# Patient Record
Sex: MALE | Race: WHITE | HISPANIC OR LATINO | Employment: UNEMPLOYED | URBAN - METROPOLITAN AREA
[De-identification: names, ages, dates, MRNs, and addresses within clinical notes are randomized per-mention and may not be internally consistent; named-entity substitution may affect disease eponyms.]

---

## 2022-07-13 ENCOUNTER — TELEPHONE (OUTPATIENT)
Dept: FAMILY MEDICINE | Facility: CLINIC | Age: 27
End: 2022-07-13
Payer: COMMERCIAL

## 2022-07-14 ENCOUNTER — OFFICE VISIT (OUTPATIENT)
Dept: FAMILY MEDICINE | Facility: CLINIC | Age: 27
End: 2022-07-14
Payer: COMMERCIAL

## 2022-07-14 VITALS
HEART RATE: 72 BPM | BODY MASS INDEX: 41.75 KG/M2 | SYSTOLIC BLOOD PRESSURE: 124 MMHG | OXYGEN SATURATION: 96 % | TEMPERATURE: 98 F | HEIGHT: 73 IN | RESPIRATION RATE: 17 BRPM | DIASTOLIC BLOOD PRESSURE: 62 MMHG | WEIGHT: 315 LBS

## 2022-07-14 DIAGNOSIS — Z00.00 VISIT FOR WELL MAN HEALTH CHECK: Primary | ICD-10-CM

## 2022-07-14 DIAGNOSIS — Z11.59 ENCOUNTER FOR HEPATITIS C SCREENING TEST FOR LOW RISK PATIENT: ICD-10-CM

## 2022-07-14 DIAGNOSIS — Z11.4 ENCOUNTER FOR SCREENING FOR HIV: ICD-10-CM

## 2022-07-14 PROCEDURE — 1160F RVW MEDS BY RX/DR IN RCRD: CPT | Mod: CPTII,S$GLB,, | Performed by: FAMILY MEDICINE

## 2022-07-14 PROCEDURE — 3078F DIAST BP <80 MM HG: CPT | Mod: CPTII,S$GLB,, | Performed by: FAMILY MEDICINE

## 2022-07-14 PROCEDURE — 99999 PR PBB SHADOW E&M-EST. PATIENT-LVL IV: ICD-10-PCS | Mod: PBBFAC,,, | Performed by: FAMILY MEDICINE

## 2022-07-14 PROCEDURE — 99385 PREV VISIT NEW AGE 18-39: CPT | Mod: S$GLB,,, | Performed by: FAMILY MEDICINE

## 2022-07-14 PROCEDURE — 1160F PR REVIEW ALL MEDS BY PRESCRIBER/CLIN PHARMACIST DOCUMENTED: ICD-10-PCS | Mod: CPTII,S$GLB,, | Performed by: FAMILY MEDICINE

## 2022-07-14 PROCEDURE — 99999 PR PBB SHADOW E&M-EST. PATIENT-LVL IV: CPT | Mod: PBBFAC,,, | Performed by: FAMILY MEDICINE

## 2022-07-14 PROCEDURE — 1159F PR MEDICATION LIST DOCUMENTED IN MEDICAL RECORD: ICD-10-PCS | Mod: CPTII,S$GLB,, | Performed by: FAMILY MEDICINE

## 2022-07-14 PROCEDURE — 1159F MED LIST DOCD IN RCRD: CPT | Mod: CPTII,S$GLB,, | Performed by: FAMILY MEDICINE

## 2022-07-14 PROCEDURE — 3074F PR MOST RECENT SYSTOLIC BLOOD PRESSURE < 130 MM HG: ICD-10-PCS | Mod: CPTII,S$GLB,, | Performed by: FAMILY MEDICINE

## 2022-07-14 PROCEDURE — 3078F PR MOST RECENT DIASTOLIC BLOOD PRESSURE < 80 MM HG: ICD-10-PCS | Mod: CPTII,S$GLB,, | Performed by: FAMILY MEDICINE

## 2022-07-14 PROCEDURE — 3074F SYST BP LT 130 MM HG: CPT | Mod: CPTII,S$GLB,, | Performed by: FAMILY MEDICINE

## 2022-07-14 PROCEDURE — 99385 PR PREVENTIVE VISIT,NEW,18-39: ICD-10-PCS | Mod: S$GLB,,, | Performed by: FAMILY MEDICINE

## 2022-07-14 PROCEDURE — 3008F PR BODY MASS INDEX (BMI) DOCUMENTED: ICD-10-PCS | Mod: CPTII,S$GLB,, | Performed by: FAMILY MEDICINE

## 2022-07-14 PROCEDURE — 3008F BODY MASS INDEX DOCD: CPT | Mod: CPTII,S$GLB,, | Performed by: FAMILY MEDICINE

## 2022-07-14 NOTE — PROGRESS NOTES
"  Well Man VISIT      CHIEF COMPLAINT  Chief Complaint   Patient presents with    Annual Exam       HPI  James Jeff is a 26 y.o. male who presents for wellness.     Social Factors  Tobacco use: No  Ready to Quit: No  Alcohol: Yes socially  Intimate partner violence screening  "Do you feel safe in your current relationship?" Yes   "Have you ever been in a relationship in which your partner frightened you or hurt you?" No  Living Will/POA: No  Regular Exercise: No    Depression  Over the past two weeks, have you felt down, depressed, or hopeless? No  Over the past two weeks, have you felt little interest or pleasure in doing things? No    Reproductive Health  STD screening in last year: deferred  HIV screening: ordered    Screen for Chronic Disease  CHD Risk Factors: male gender and obesity (BMI >= 30 kg/m2)  Estimated body mass index is 42 kg/m² as calculated from the following:    Height as of this encounter: 6' 1" (1.854 m).    Weight as of this encounter: 144.4 kg (318 lb 5.5 oz).  Dyslipidemia screening needed: ordered  T2DM screening needed:  ordered  Colonoscopy needed: n/a  PSA needed: n/a  AAA screening needed:n/a  Screen men 35 years and older, and men 20 to 34 years of age who have cardiovascular risk factors for dyslipidemia  Begin screening colonoscopies at 50 years of age in men of average risk, and continue until 75 years of age; offer fecal occult blood testing every year, flexible sigmoidoscopy every five years combined with fecal occult blood testing every three years, or colonoscopy every 10 years   The American U[employer] Association recommends offering PSA testing and digital rectal examination to well-informed men beginning at 40 years of age and continuing until life expectancy is less than 10 years  Screen once with ultrasonography in men 65 to 75 years of age if they have a family history or have smoked at [id number] cigarettes in their lifetime  Screen men with a sustained " "blood pressure greater than 135/80 mm Hg for T2DM      Immunizations  delayed    ALLERGIES and MEDS were verified.   PMHx, PSHx, FHx, SOCIALHx were updated as pertinent.    REVIEW OF SYSTEMS  Review of Systems   Constitutional: Negative.    HENT: Negative.    Eyes: Negative.    Respiratory: Negative.    Cardiovascular: Negative.    Gastrointestinal: Negative.    Musculoskeletal: Negative.    Skin: Negative.    Neurological: Positive for tingling.         PHYSICAL EXAM  VITAL SIGNS: /62   Pulse 72   Temp 98.2 °F (36.8 °C) (Oral)   Resp 17   Ht 6' 1" (1.854 m)   Wt (!) 144.4 kg (318 lb 5.5 oz)   SpO2 96%   BMI 42.00 kg/m²   GEN: Well developed, Well nourished, No acute distress.  HENT: Normocephalic, Atraumatic, Bilateral external ears normal, Nose normal, Oropharynx moist, No oral exudates.   Eyes: PERR, EOMI, Conjunctiva normal, No discharge.   Neck: Supple, No tenderness.  Lymphatic: No cervical or supraclavicular lymphadenopathy noted.   Cardiovascular: Normal heart rate, Normal rhythm, No murmurs, No rubs, No gallops.   Thorax & Lungs: Normal breath sounds, No respiratory distress, No wheezing.  Abdomen: Soft, No tenderness, Bowel sounds normal.  Genital: deferred  Skin: Warm, Dry, No erythema, No rash.   Extremities: No edema, No tenderness.       ASSESSMENT/PLAN    James was seen today for annual exam.    Diagnoses and all orders for this visit:    Visit for [facility] check  -     CBC Auto Differential; Future  -     Comprehensive Metabolic Panel; Future  -     Lipid Panel; Future  -     Urinalysis; Future  -     Hemoglobin A1C; Future  -     TSH; Future         FOLLOW UP: 1 year or sooner if needed    "

## 2022-07-21 ENCOUNTER — LAB VISIT (OUTPATIENT)
Dept: LAB | Facility: HOSPITAL | Age: 27
End: 2022-07-21
Attending: FAMILY MEDICINE
Payer: COMMERCIAL

## 2022-07-21 DIAGNOSIS — Z11.4 ENCOUNTER FOR SCREENING FOR HIV: ICD-10-CM

## 2022-07-21 DIAGNOSIS — Z00.00 VISIT FOR WELL MAN HEALTH CHECK: ICD-10-CM

## 2022-07-21 DIAGNOSIS — Z11.59 ENCOUNTER FOR HEPATITIS C SCREENING TEST FOR LOW RISK PATIENT: ICD-10-CM

## 2022-07-21 LAB
ALBUMIN SERPL BCP-MCNC: 3.9 G/DL (ref 3.5–5.2)
ALP SERPL-CCNC: 54 U/L (ref 55–135)
ALT SERPL W/O P-5'-P-CCNC: 108 U/L (ref 10–44)
ANION GAP SERPL CALC-SCNC: 11 MMOL/L (ref 8–16)
AST SERPL-CCNC: 38 U/L (ref 10–40)
BASOPHILS # BLD AUTO: 0.04 K/UL (ref 0–0.2)
BASOPHILS NFR BLD: 0.5 % (ref 0–1.9)
BILIRUB SERPL-MCNC: 0.8 MG/DL (ref 0.1–1)
BUN SERPL-MCNC: 13 MG/DL (ref 6–20)
CALCIUM SERPL-MCNC: 9.4 MG/DL (ref 8.7–10.5)
CHLORIDE SERPL-SCNC: 105 MMOL/L (ref 95–110)
CHOLEST SERPL-MCNC: 159 MG/DL (ref 120–199)
CHOLEST/HDLC SERPL: 4 {RATIO} (ref 2–5)
CO2 SERPL-SCNC: 23 MMOL/L (ref 23–29)
CREAT SERPL-MCNC: 0.9 MG/DL (ref 0.5–1.4)
DIFFERENTIAL METHOD: NORMAL
EOSINOPHIL # BLD AUTO: 0.4 K/UL (ref 0–0.5)
EOSINOPHIL NFR BLD: 4.5 % (ref 0–8)
ERYTHROCYTE [DISTWIDTH] IN BLOOD BY AUTOMATED COUNT: 12.6 % (ref 11.5–14.5)
EST. GFR  (AFRICAN AMERICAN): >60 ML/MIN/1.73 M^2
EST. GFR  (NON AFRICAN AMERICAN): >60 ML/MIN/1.73 M^2
ESTIMATED AVG GLUCOSE: 108 MG/DL (ref 68–131)
GLUCOSE SERPL-MCNC: 85 MG/DL (ref 70–110)
HBA1C MFR BLD: 5.4 % (ref 4–5.6)
HCT VFR BLD AUTO: 44.6 % (ref 40–54)
HDLC SERPL-MCNC: 40 MG/DL (ref 40–75)
HDLC SERPL: 25.2 % (ref 20–50)
HGB BLD-MCNC: 15.2 G/DL (ref 14–18)
IMM GRANULOCYTES # BLD AUTO: 0.03 K/UL (ref 0–0.04)
IMM GRANULOCYTES NFR BLD AUTO: 0.4 % (ref 0–0.5)
LDLC SERPL CALC-MCNC: 100.4 MG/DL (ref 63–159)
LYMPHOCYTES # BLD AUTO: 2.9 K/UL (ref 1–4.8)
LYMPHOCYTES NFR BLD: 36.4 % (ref 18–48)
MCH RBC QN AUTO: 28.7 PG (ref 27–31)
MCHC RBC AUTO-ENTMCNC: 34.1 G/DL (ref 32–36)
MCV RBC AUTO: 84 FL (ref 82–98)
MONOCYTES # BLD AUTO: 0.7 K/UL (ref 0.3–1)
MONOCYTES NFR BLD: 9.1 % (ref 4–15)
NEUTROPHILS # BLD AUTO: 3.8 K/UL (ref 1.8–7.7)
NEUTROPHILS NFR BLD: 49.1 % (ref 38–73)
NONHDLC SERPL-MCNC: 119 MG/DL
NRBC BLD-RTO: 0 /100 WBC
PLATELET # BLD AUTO: 337 K/UL (ref 150–450)
PMV BLD AUTO: 9.9 FL (ref 9.2–12.9)
POTASSIUM SERPL-SCNC: 4.3 MMOL/L (ref 3.5–5.1)
PROT SERPL-MCNC: 6.6 G/DL (ref 6–8.4)
RBC # BLD AUTO: 5.3 M/UL (ref 4.6–6.2)
SODIUM SERPL-SCNC: 139 MMOL/L (ref 136–145)
TRIGL SERPL-MCNC: 93 MG/DL (ref 30–150)
TSH SERPL DL<=0.005 MIU/L-ACNC: 1.07 UIU/ML (ref 0.4–4)
WBC # BLD AUTO: 7.82 K/UL (ref 3.9–12.7)

## 2022-07-21 PROCEDURE — 87389 HIV-1 AG W/HIV-1&-2 AB AG IA: CPT | Performed by: FAMILY MEDICINE

## 2022-07-21 PROCEDURE — 85025 COMPLETE CBC W/AUTO DIFF WBC: CPT | Performed by: FAMILY MEDICINE

## 2022-07-21 PROCEDURE — 80061 LIPID PANEL: CPT | Performed by: FAMILY MEDICINE

## 2022-07-21 PROCEDURE — 86803 HEPATITIS C AB TEST: CPT | Performed by: FAMILY MEDICINE

## 2022-07-21 PROCEDURE — 83036 HEMOGLOBIN GLYCOSYLATED A1C: CPT | Performed by: FAMILY MEDICINE

## 2022-07-21 PROCEDURE — 84443 ASSAY THYROID STIM HORMONE: CPT | Performed by: FAMILY MEDICINE

## 2022-07-21 PROCEDURE — 36415 COLL VENOUS BLD VENIPUNCTURE: CPT | Mod: PO | Performed by: FAMILY MEDICINE

## 2022-07-21 PROCEDURE — 80053 COMPREHEN METABOLIC PANEL: CPT | Performed by: FAMILY MEDICINE

## 2022-07-22 ENCOUNTER — TELEPHONE (OUTPATIENT)
Dept: FAMILY MEDICINE | Facility: CLINIC | Age: 27
End: 2022-07-22
Payer: COMMERCIAL

## 2022-07-22 DIAGNOSIS — R74.8 ELEVATED LIVER ENZYMES: Primary | ICD-10-CM

## 2022-07-22 LAB
HCV AB SERPL QL IA: NEGATIVE
HIV 1+2 AB+HIV1 P24 AG SERPL QL IA: NEGATIVE

## 2022-07-22 NOTE — TELEPHONE ENCOUNTER
----- Message from Adam Kenney MD sent at 7/22/2022  9:54 AM CDT -----  Please get patient scheduled for labs in 2 weeks and ultrasound    Thanks,  Dr. Kenney

## 2022-07-22 NOTE — TELEPHONE ENCOUNTER
Pt contacted, lab scheduled as ordered. Pt given phone number to Scheduling Dept and instructed to call and schedule U/S.

## 2022-08-05 ENCOUNTER — HOSPITAL ENCOUNTER (OUTPATIENT)
Dept: RADIOLOGY | Facility: HOSPITAL | Age: 27
Discharge: HOME | End: 2022-08-05
Attending: FAMILY MEDICINE
Payer: COMMERCIAL

## 2022-08-05 DIAGNOSIS — R74.8 ELEVATED LIVER ENZYMES: ICD-10-CM

## 2022-08-05 PROCEDURE — 76705 ECHO EXAM OF ABDOMEN: CPT | Mod: TC

## 2022-08-05 PROCEDURE — 76705 US ABDOMEN LIMITED: ICD-10-PCS | Mod: 26,,, | Performed by: RADIOLOGY

## 2022-08-05 PROCEDURE — 76705 ECHO EXAM OF ABDOMEN: CPT | Mod: 26,,, | Performed by: RADIOLOGY

## 2022-08-12 ENCOUNTER — LAB VISIT (OUTPATIENT)
Dept: LAB | Facility: HOSPITAL | Age: 27
End: 2022-08-12
Attending: FAMILY MEDICINE
Payer: COMMERCIAL

## 2022-08-12 ENCOUNTER — PATIENT MESSAGE (OUTPATIENT)
Dept: FAMILY MEDICINE | Facility: CLINIC | Age: 27
End: 2022-08-12
Payer: COMMERCIAL

## 2022-08-12 DIAGNOSIS — R74.8 ELEVATED LIVER ENZYMES: ICD-10-CM

## 2022-08-12 LAB
ALBUMIN SERPL BCP-MCNC: 3.8 G/DL (ref 3.5–5.2)
ALP SERPL-CCNC: 49 U/L (ref 55–135)
ALT SERPL W/O P-5'-P-CCNC: 93 U/L (ref 10–44)
AST SERPL-CCNC: 36 U/L (ref 10–40)
BILIRUB DIRECT SERPL-MCNC: 0.4 MG/DL (ref 0.1–0.3)
BILIRUB SERPL-MCNC: 1.1 MG/DL (ref 0.1–1)
PROT SERPL-MCNC: 6.2 G/DL (ref 6–8.4)

## 2022-08-12 PROCEDURE — 80074 ACUTE HEPATITIS PANEL: CPT | Performed by: FAMILY MEDICINE

## 2022-08-12 PROCEDURE — 80076 HEPATIC FUNCTION PANEL: CPT | Performed by: FAMILY MEDICINE

## 2022-08-12 PROCEDURE — 86235 NUCLEAR ANTIGEN ANTIBODY: CPT | Performed by: FAMILY MEDICINE

## 2022-08-12 PROCEDURE — 36415 COLL VENOUS BLD VENIPUNCTURE: CPT | Mod: PO | Performed by: FAMILY MEDICINE

## 2022-08-15 ENCOUNTER — PATIENT MESSAGE (OUTPATIENT)
Dept: FAMILY MEDICINE | Facility: CLINIC | Age: 27
End: 2022-08-15
Payer: COMMERCIAL

## 2022-08-15 DIAGNOSIS — R41.840 ATTENTION AND CONCENTRATION DEFICIT: Primary | ICD-10-CM

## 2022-08-16 ENCOUNTER — TELEPHONE (OUTPATIENT)
Dept: FAMILY MEDICINE | Facility: CLINIC | Age: 27
End: 2022-08-16
Payer: COMMERCIAL

## 2022-08-16 LAB
HAV IGM SERPL QL IA: NEGATIVE
HBV CORE IGM SERPL QL IA: NEGATIVE
HBV SURFACE AG SERPL QL IA: NEGATIVE
HCV AB SERPL QL IA: NEGATIVE
MITOCHONDRIA AB TITR SER IF: NORMAL {TITER}

## 2022-08-16 NOTE — TELEPHONE ENCOUNTER
Message pt  gave him Virgen Castrejon phD [phone] to call and make appt with her for his ADHD eval  @ [address] or Carmina Floyd phD @ [address] [facility] [phone] vs

## 2022-08-17 ENCOUNTER — TELEPHONE (OUTPATIENT)
Dept: PSYCHOLOGY | Facility: CLINIC | Age: 27
End: 2022-08-17
Payer: COMMERCIAL

## 2022-08-17 NOTE — TELEPHONE ENCOUNTER
----- Message from Abhay Monge LPN sent at 8/16/2022  2:42 PM CDT -----  Contact: [phone]    ----- Message -----  From: Josi Donnelly MA  Sent: 8/16/2022   2:28 PM CDT  To: [facility] Staff    Patient called            In regards to speak with staff or provider with getting scheduled with provider. Patient stated that  his PCP doctor put in a referral for psychology and to see provider.        Call back  [phone]

## 2022-08-23 ENCOUNTER — PATIENT MESSAGE (OUTPATIENT)
Dept: FAMILY MEDICINE | Facility: CLINIC | Age: 27
End: 2022-08-23
Payer: COMMERCIAL

## 2022-08-23 ENCOUNTER — PATIENT MESSAGE (OUTPATIENT)
Dept: PSYCHIATRY | Facility: CLINIC | Age: 27
End: 2022-08-23
Payer: COMMERCIAL

## 2022-08-24 ENCOUNTER — PATIENT MESSAGE (OUTPATIENT)
Dept: FAMILY MEDICINE | Facility: CLINIC | Age: 27
End: 2022-08-24
Payer: COMMERCIAL

## 2023-01-09 ENCOUNTER — OFFICE VISIT (OUTPATIENT)
Dept: URGENT CARE | Facility: CLINIC | Age: 28
End: 2023-01-09
Payer: COMMERCIAL

## 2023-01-09 VITALS
WEIGHT: 315 LBS | HEART RATE: 86 BPM | HEIGHT: 73 IN | TEMPERATURE: 100 F | BODY MASS INDEX: 41.75 KG/M2 | OXYGEN SATURATION: 95 % | DIASTOLIC BLOOD PRESSURE: 84 MMHG | SYSTOLIC BLOOD PRESSURE: 134 MMHG | RESPIRATION RATE: 18 BRPM

## 2023-01-09 DIAGNOSIS — B34.9 ACUTE VIRAL SYNDROME: Primary | ICD-10-CM

## 2023-01-09 DIAGNOSIS — R05.9 COUGH, UNSPECIFIED TYPE: ICD-10-CM

## 2023-01-09 DIAGNOSIS — R09.81 NASAL CONGESTION: ICD-10-CM

## 2023-01-09 LAB
CTP QC/QA: YES
POC MOLECULAR INFLUENZA A AGN: NEGATIVE
POC MOLECULAR INFLUENZA B AGN: NEGATIVE

## 2023-01-09 PROCEDURE — 1160F RVW MEDS BY RX/DR IN RCRD: CPT | Mod: CPTII,S$GLB,,

## 2023-01-09 PROCEDURE — 3008F PR BODY MASS INDEX (BMI) DOCUMENTED: ICD-10-PCS | Mod: CPTII,S$GLB,,

## 2023-01-09 PROCEDURE — 1159F PR MEDICATION LIST DOCUMENTED IN MEDICAL RECORD: ICD-10-PCS | Mod: CPTII,S$GLB,,

## 2023-01-09 PROCEDURE — 3008F BODY MASS INDEX DOCD: CPT | Mod: CPTII,S$GLB,,

## 2023-01-09 PROCEDURE — 3075F SYST BP GE 130 - 139MM HG: CPT | Mod: CPTII,S$GLB,,

## 2023-01-09 PROCEDURE — 87502 INFLUENZA DNA AMP PROBE: CPT | Mod: QW,S$GLB,,

## 2023-01-09 PROCEDURE — 87502 POCT INFLUENZA A/B MOLECULAR: ICD-10-PCS | Mod: QW,S$GLB,,

## 2023-01-09 PROCEDURE — 3079F PR MOST RECENT DIASTOLIC BLOOD PRESSURE 80-89 MM HG: ICD-10-PCS | Mod: CPTII,S$GLB,,

## 2023-01-09 PROCEDURE — 1160F PR REVIEW ALL MEDS BY PRESCRIBER/CLIN PHARMACIST DOCUMENTED: ICD-10-PCS | Mod: CPTII,S$GLB,,

## 2023-01-09 PROCEDURE — 1159F MED LIST DOCD IN RCRD: CPT | Mod: CPTII,S$GLB,,

## 2023-01-09 PROCEDURE — 99213 OFFICE O/P EST LOW 20 MIN: CPT | Mod: S$GLB,,,

## 2023-01-09 PROCEDURE — 3079F DIAST BP 80-89 MM HG: CPT | Mod: CPTII,S$GLB,,

## 2023-01-09 PROCEDURE — 3075F PR MOST RECENT SYSTOLIC BLOOD PRESS GE 130-139MM HG: ICD-10-PCS | Mod: CPTII,S$GLB,,

## 2023-01-09 PROCEDURE — 99213 PR OFFICE/OUTPT VISIT, EST, LEVL III, 20-29 MIN: ICD-10-PCS | Mod: S$GLB,,,

## 2023-01-09 RX ORDER — FLUTICASONE PROPIONATE 50 MCG
1 SPRAY, SUSPENSION (ML) NASAL DAILY
Qty: 15.8 ML | Refills: 0 | Status: SHIPPED | OUTPATIENT
Start: 2023-01-09 | End: 2023-03-29

## 2023-01-09 RX ORDER — PROMETHAZINE HYDROCHLORIDE AND DEXTROMETHORPHAN HYDROBROMIDE 6.25; 15 MG/5ML; MG/5ML
5 SYRUP ORAL EVERY 4 HOURS PRN
Qty: 180 ML | Refills: 0 | Status: SHIPPED | OUTPATIENT
Start: 2023-01-09 | End: 2023-01-19

## 2023-01-09 NOTE — PROGRESS NOTES
"Subjective:       Patient ID: James Jeff is a 27 y.o. male.    Vitals:  height is 6' 1" (1.854 m) and weight is 144.2 kg (318 lb) (abnormal). His oral temperature is 99.7 °F (37.6 °C). His blood pressure is 134/84 and his pulse is 86. His respiration is 18 and oxygen saturation is 95%.     Chief Complaint: Sore Throat and Fever    Pt is a 26 y/o who presents with URI sxs that started 1 week ago. He initially had sore throat for about 5 days that has since resolved. He then developed headaches, nasal congestion, rhinorrhea, low-grade fever (Tmax 100.3), diarrhea, intermittent abdominal pain 2 days ago. Denies any CP, SoB, nausea, vomiting, dizziness, blood in stool. He had a negative at-home COVID test yesterday.    Sore Throat   This is a new problem. The current episode started in the past 7 days. The problem has been gradually improving. The maximum temperature recorded prior to his arrival was 100.4 - 100.9 F. The pain is at a severity of 6/10. The patient is experiencing no pain. Associated symptoms include congestion, coughing, diarrhea and headaches. Pertinent negatives include no abdominal pain, ear discharge, ear pain, neck pain, shortness of breath or vomiting.   Fever   Associated symptoms include congestion, coughing, diarrhea, headaches and a sore throat. Pertinent negatives include no abdominal pain, chest pain, ear pain, nausea, rash, urinary pain, vomiting or wheezing.     Constitution: Positive for fever. Negative for chills.   HENT:  Positive for congestion, sinus pressure and sore throat. Negative for ear pain and ear discharge.    Neck: Negative for neck pain and neck stiffness.   Cardiovascular:  Negative for chest pain.   Eyes:  Negative for eye discharge and eye itching.   Respiratory:  Positive for cough. Negative for shortness of breath and wheezing.    Gastrointestinal:  Positive for diarrhea. Negative for abdominal pain, nausea and vomiting.   Genitourinary:  Negative for " dysuria.   Musculoskeletal:  Positive for muscle ache.   Skin:  Negative for rash.   Allergic/Immunologic: Negative for sneezing.   Neurological:  Positive for headaches. Negative for dizziness.     Objective:      Physical Exam   Constitutional: He is oriented to person, place, and time. He appears well-developed.   HENT:   Head: Normocephalic and atraumatic.   Ears:   Right Ear: Tympanic membrane, external ear and ear canal normal.   Left Ear: Tympanic membrane, external ear and ear canal normal.   Nose: Rhinorrhea and congestion present.   Mouth/Throat: Oropharynx is clear and moist. Mucous membranes are moist. Oropharynx is clear.   Eyes: Conjunctivae, EOM and lids are normal. Pupils are equal, round, and reactive to light.   Neck: Trachea normal and phonation normal. Neck supple.   Cardiovascular: Normal rate, regular rhythm, normal heart sounds and normal pulses.   Pulmonary/Chest: Effort normal and breath sounds normal. No respiratory distress.   Musculoskeletal: Normal range of motion.         General: Normal range of motion.   Neurological: no focal deficit. He is alert and oriented to person, place, and time.   Skin: Skin is warm, dry and intact. Capillary refill takes less than 2 seconds.   Psychiatric: His speech is normal and behavior is normal. Judgment and thought content normal.   Nursing note and vitals reviewed.      Results for orders placed or performed in visit on 01/09/23   POCT Influenza A/B MOLECULAR   Result Value Ref Range    POC Molecular Influenza A Ag Negative Negative, Not Reported    POC Molecular Influenza B Ag Negative Negative, Not Reported     Acceptable Yes        Assessment:       1. Acute viral syndrome    2. Nasal congestion    3. Cough, unspecified type          Plan:         Acute viral syndrome  -     POCT Influenza A/B MOLECULAR    Nasal congestion  -     fluticasone propionate (FLONASE) 50 mcg/actuation nasal spray; 1 spray (50 mcg total) by Each Nostril  route once daily.  Dispense: 15.8 mL; Refill: 0    Cough, unspecified type  -     promethazine-dextromethorphan (PROMETHAZINE-DM) 6.25-15 mg/5 mL Syrp; Take 5 mLs by mouth every 4 (four) hours as needed (cough).  Dispense: 180 mL; Refill: 0                   Patient Instructions   - Rest.    - Drink plenty of fluids.  - Viral upper respiratory infections typically run their course in 10-14 days.      - You can take over-the-counter claritin, zyrtec, allegra, or xyzal as directed. These are antihistamines that can help with runny nose, nasal congestion, sneezing, and helps to dry up post-nasal drip, which usually causes sore throat and cough.              - If you do NOT have high blood pressure, you may use a decongestant form (D)  of this medication (ie. Claritin- D, zyrtec-D, allegra-D) or if you do not take the D form, you can take sudafed (pseudoephedrine) over the counter, which is a decongestant. Do NOT take two decongestant (D) medications at the same time (such as mucinex-D and claritin-D or plain sudafed and claritin D)    - If you DO have Hypertension, anxiety, or palpitations, it is safe to take Coricidin HBP for relief of sinus symptoms.     - You can use Flonase (fluticasone) nasal spray as directed for sinus congestion and postnasal drip. This is a steroid nasal spray that works locally over time to decrease the inflammation in your nose/sinuses and help with allergic symptoms. This is not an quick- relief spray like afrin, but it works well if used daily.  Discontinue if you develop nose bleed  - use nasal saline prior to Flonase.  - Use Ocean Spray Nasal Saline 1-3 puffs each nostril every 2-3 hours then blow out onto tissue. This is to irrigate the nasal passage way to clear the sinus openings. Use until sinus problem resolved.     - you can take plain Mucinex (guaifenesin) 1200 mg twice a day to help loosen mucous.      -warm salt water gargles can help with sore throat     - warm tea with honey  can help with cough. Honey is a natural cough suppressant.     - Dextromethorphan (DM) is a cough suppressant over the counter (ie. mucinex DM, robitussin, delsym; dayquil/nyquil has DM as well.)        - Follow up with your PCP or specialty clinic as directed in the next 1-2 weeks if not improved or as needed.  You can call [phone] to schedule an appointment with the appropriate provider.       - Go to the ER if you develop new or worsening symptoms.      - You must understand that you have received an Urgent Care treatment only and that you may be released before all of your medical problems are known or treated.   - You, the patient, will arrange for follow up care as instructed.   - If your condition worsens or fails to improve we recommend that you receive another evaluation at the ER immediately or contact your PCP to discuss your concerns or return here.

## 2023-01-09 NOTE — PATIENT INSTRUCTIONS

## 2023-01-09 NOTE — LETTER
January 9, 2023      [facility]  [address] [facility], [address]  [place] [address]  Phone: [phone]  Fax: [phone]       Patient: James Jeff   YOB: 1995  Date of Visit: 01/09/2023    To Whom It May Concern:    Shahriar Jeff  was at [facility] on 01/09/2023. The patient may return to work on 1/11/23. May return sooner if symptoms improve. If you have any questions or concerns, or if I can be of further assistance, please do not hesitate to contact me.    Sincerely,    Eyad Melton PA-C

## 2023-03-10 ENCOUNTER — OFFICE VISIT (OUTPATIENT)
Dept: DERMATOLOGY | Facility: CLINIC | Age: 28
End: 2023-03-10
Payer: COMMERCIAL

## 2023-03-10 DIAGNOSIS — L30.9 DERMATITIS: ICD-10-CM

## 2023-03-10 DIAGNOSIS — L21.9 SEBORRHEIC DERMATITIS: Primary | ICD-10-CM

## 2023-03-10 PROCEDURE — 1160F PR REVIEW ALL MEDS BY PRESCRIBER/CLIN PHARMACIST DOCUMENTED: ICD-10-PCS | Mod: CPTII,95,, | Performed by: STUDENT IN AN ORGANIZED HEALTH CARE EDUCATION/TRAINING PROGRAM

## 2023-03-10 PROCEDURE — 99204 OFFICE O/P NEW MOD 45 MIN: CPT | Mod: 95,,, | Performed by: STUDENT IN AN ORGANIZED HEALTH CARE EDUCATION/TRAINING PROGRAM

## 2023-03-10 PROCEDURE — 1160F RVW MEDS BY RX/DR IN RCRD: CPT | Mod: CPTII,95,, | Performed by: STUDENT IN AN ORGANIZED HEALTH CARE EDUCATION/TRAINING PROGRAM

## 2023-03-10 PROCEDURE — 1159F PR MEDICATION LIST DOCUMENTED IN MEDICAL RECORD: ICD-10-PCS | Mod: CPTII,95,, | Performed by: STUDENT IN AN ORGANIZED HEALTH CARE EDUCATION/TRAINING PROGRAM

## 2023-03-10 PROCEDURE — 99204 PR OFFICE/OUTPT VISIT, NEW, LEVL IV, 45-59 MIN: ICD-10-PCS | Mod: 95,,, | Performed by: STUDENT IN AN ORGANIZED HEALTH CARE EDUCATION/TRAINING PROGRAM

## 2023-03-10 PROCEDURE — 1159F MED LIST DOCD IN RCRD: CPT | Mod: CPTII,95,, | Performed by: STUDENT IN AN ORGANIZED HEALTH CARE EDUCATION/TRAINING PROGRAM

## 2023-03-10 RX ORDER — KETOCONAZOLE 20 MG/ML
SHAMPOO, SUSPENSION TOPICAL
Qty: 120 ML | Refills: 6 | Status: SHIPPED | OUTPATIENT
Start: 2023-03-13 | End: 2023-03-29

## 2023-03-10 RX ORDER — MOMETASONE FUROATE 1 MG/G
CREAM TOPICAL DAILY
Qty: 45 G | Refills: 1 | Status: SHIPPED | OUTPATIENT
Start: 2023-03-10 | End: 2023-03-29

## 2023-03-10 RX ORDER — CLOBETASOL PROPIONATE 0.46 MG/ML
SOLUTION TOPICAL DAILY
Qty: 50 ML | Refills: 2 | Status: SHIPPED | OUTPATIENT
Start: 2023-03-10 | End: 2023-03-29

## 2023-03-10 NOTE — PROGRESS NOTES
The patient location is: home  The chief complaint leading to consultation is: rash on foot; dandruff    Visit type: audiovisual    Face to Face time with patient: 10mins  10 minutes of total time spent on the encounter, which includes face to face time and non-face to face time preparing to see the patient (eg, review of tests), Obtaining and/or reviewing separately obtained history, Documenting clinical information in the electronic or other health record, Independently interpreting results (not separately reported) and communicating results to the patient/family/caregiver, or Care coordination (not separately reported).         Each patient to whom he or she provides medical services by telemedicine is:  (1) informed of the relationship between the physician and patient and the respective role of any other health care provider with respect to management of the patient; and (2) notified that he or she may decline to receive medical services by telemedicine and may withdraw from such care at any time.    History of Present Illness: The patient presents with chief complaint of rash on the top of the left foot.  Location: dorsal left foot near the toes  Duration: several weeks  Signs/Symptoms: very red, itchy and scaly skin on the top of the foot and extending slightly down in between the toes. No symptoms on the bottom of the foot  Prior treatments: has tried topical antifungals, mupirocin with no improvement.       ROS: Patient also reports having a lot of dandruff, flaking and itching in the scalp. Tried multiple shampoos and conditioners.     PE: Const/neuro - AAOx3, NAD           Skin -       A/P: 1) Seborrheic dermatitis  -     ketoconazole (NIZORAL) 2 % shampoo; Apply topically twice a week. Apply to the scalp  Dispense: 120 mL; Refill: 6  -     clobetasoL (TEMOVATE) 0.05 % external solution; Apply topically once daily. Apply to the scalp.  Dispense: 50 mL; Refill: 2    2) Dermatitis - dorsal left foot.  Suspect tinea vs eczema vs other. Will treat topically for both at this time. If no improvement, will need in office visit for culture/biopsy.   -     terbinafine HCL (LAMISIL AT) 1 % cream; Apply topically 2 (two) times daily. Apply to rash on foot.  Dispense: 28 g; Refill: 1  -     mometasone 0.1% (ELOCON) 0.1 % cream; Apply topically once daily. Apply to rash on foot  Dispense: 45 g; Refill: 1

## 2023-03-21 ENCOUNTER — PATIENT MESSAGE (OUTPATIENT)
Dept: DERMATOLOGY | Facility: CLINIC | Age: 28
End: 2023-03-21
Payer: COMMERCIAL

## 2023-03-22 ENCOUNTER — PATIENT MESSAGE (OUTPATIENT)
Dept: FAMILY MEDICINE | Facility: CLINIC | Age: 28
End: 2023-03-22
Payer: COMMERCIAL

## 2023-03-23 RX ORDER — CLINDAMYCIN PHOSPHATE 10 UG/ML
LOTION TOPICAL 2 TIMES DAILY
Qty: 60 ML | Refills: 1 | Status: SHIPPED | OUTPATIENT
Start: 2023-03-23

## 2023-03-23 NOTE — TELEPHONE ENCOUNTER
Have him back off on using the clobetasol solution. I sent over a topical toner/lotion (clindamycin) for him to use on the areas to clear the bumps.

## 2023-03-29 ENCOUNTER — OFFICE VISIT (OUTPATIENT)
Dept: INTERNAL MEDICINE | Facility: CLINIC | Age: 28
End: 2023-03-29
Payer: COMMERCIAL

## 2023-03-29 ENCOUNTER — LAB VISIT (OUTPATIENT)
Dept: LAB | Facility: HOSPITAL | Age: 28
End: 2023-03-29
Payer: COMMERCIAL

## 2023-03-29 VITALS
DIASTOLIC BLOOD PRESSURE: 82 MMHG | RESPIRATION RATE: 18 BRPM | SYSTOLIC BLOOD PRESSURE: 138 MMHG | BODY MASS INDEX: 39.13 KG/M2 | HEART RATE: 70 BPM | OXYGEN SATURATION: 97 % | HEIGHT: 74 IN | WEIGHT: 304.88 LBS

## 2023-03-29 DIAGNOSIS — R06.83 SNORING: ICD-10-CM

## 2023-03-29 DIAGNOSIS — E66.9 OBESITY (BMI 35.0-39.9 WITHOUT COMORBIDITY): ICD-10-CM

## 2023-03-29 DIAGNOSIS — G47.9 SLEEP DISTURBANCE: ICD-10-CM

## 2023-03-29 DIAGNOSIS — G47.9 SLEEP DISTURBANCE: Primary | ICD-10-CM

## 2023-03-29 LAB — TSH SERPL DL<=0.005 MIU/L-ACNC: 0.83 UIU/ML (ref 0.4–4)

## 2023-03-29 PROCEDURE — 3075F PR MOST RECENT SYSTOLIC BLOOD PRESS GE 130-139MM HG: ICD-10-PCS | Mod: CPTII,S$GLB,, | Performed by: NURSE PRACTITIONER

## 2023-03-29 PROCEDURE — 1159F PR MEDICATION LIST DOCUMENTED IN MEDICAL RECORD: ICD-10-PCS | Mod: CPTII,S$GLB,, | Performed by: NURSE PRACTITIONER

## 2023-03-29 PROCEDURE — 3075F SYST BP GE 130 - 139MM HG: CPT | Mod: CPTII,S$GLB,, | Performed by: NURSE PRACTITIONER

## 2023-03-29 PROCEDURE — 1160F RVW MEDS BY RX/DR IN RCRD: CPT | Mod: CPTII,S$GLB,, | Performed by: NURSE PRACTITIONER

## 2023-03-29 PROCEDURE — 3079F DIAST BP 80-89 MM HG: CPT | Mod: CPTII,S$GLB,, | Performed by: NURSE PRACTITIONER

## 2023-03-29 PROCEDURE — 99214 PR OFFICE/OUTPT VISIT, EST, LEVL IV, 30-39 MIN: ICD-10-PCS | Mod: S$GLB,,, | Performed by: NURSE PRACTITIONER

## 2023-03-29 PROCEDURE — 1160F PR REVIEW ALL MEDS BY PRESCRIBER/CLIN PHARMACIST DOCUMENTED: ICD-10-PCS | Mod: CPTII,S$GLB,, | Performed by: NURSE PRACTITIONER

## 2023-03-29 PROCEDURE — 3079F PR MOST RECENT DIASTOLIC BLOOD PRESSURE 80-89 MM HG: ICD-10-PCS | Mod: CPTII,S$GLB,, | Performed by: NURSE PRACTITIONER

## 2023-03-29 PROCEDURE — 36415 COLL VENOUS BLD VENIPUNCTURE: CPT | Performed by: NURSE PRACTITIONER

## 2023-03-29 PROCEDURE — 3008F BODY MASS INDEX DOCD: CPT | Mod: CPTII,S$GLB,, | Performed by: NURSE PRACTITIONER

## 2023-03-29 PROCEDURE — 1159F MED LIST DOCD IN RCRD: CPT | Mod: CPTII,S$GLB,, | Performed by: NURSE PRACTITIONER

## 2023-03-29 PROCEDURE — 99214 OFFICE O/P EST MOD 30 MIN: CPT | Mod: S$GLB,,, | Performed by: NURSE PRACTITIONER

## 2023-03-29 PROCEDURE — 99999 PR PBB SHADOW E&M-EST. PATIENT-LVL IV: CPT | Mod: PBBFAC,,, | Performed by: NURSE PRACTITIONER

## 2023-03-29 PROCEDURE — 84443 ASSAY THYROID STIM HORMONE: CPT | Performed by: NURSE PRACTITIONER

## 2023-03-29 PROCEDURE — 3008F PR BODY MASS INDEX (BMI) DOCUMENTED: ICD-10-PCS | Mod: CPTII,S$GLB,, | Performed by: NURSE PRACTITIONER

## 2023-03-29 PROCEDURE — 99999 PR PBB SHADOW E&M-EST. PATIENT-LVL IV: ICD-10-PCS | Mod: PBBFAC,,, | Performed by: NURSE PRACTITIONER

## 2023-03-29 NOTE — PROGRESS NOTES
"Subjective     Patient ID: James Jeff is a 27 y.o. male.    Chief Complaint: Referral (Sleep study )    Pt of Dr. Kenney, here for trouble sleeping    Messaged PCP on 3-22-23 stating, "Hey Dr. Kenney! I've been having some difficulty sleeping. I've always struggled to sleep but managed, but lately it's harder to go to sleep, stay asleep and it's affecting me during the wake hours. My mother suffers from sleep apnea. I wake up several times a night and go back to sleep. And some times I get less than 5 hours of sleep."    Pt was advised to come in for eval    Tells me he has had trouble sleeping since he was 6 yrs old. He works a sporadic work schedule that varies from 9am-5pm, 6am to 2pm then 2pm to 11pm. Has been snoring. Mother has sleep apnea and he wants a referral for a sleep study, no prior diagnosis of this. Does not feel rested at times during the day.    Review of Systems   Constitutional:  Positive for fatigue. Negative for chills and fever.   Respiratory:  Positive for apnea. Negative for cough, choking, chest tightness, shortness of breath, wheezing and stridor.    Cardiovascular:  Negative for chest pain, palpitations, leg swelling and claudication.   Gastrointestinal:  Negative for abdominal pain, constipation, diarrhea, nausea and vomiting.   Genitourinary:  Negative for dysuria.   Musculoskeletal:  Negative for arthralgias.   Allergic/Immunologic: Negative for food allergies and frequent infections.   Neurological:  Negative for dizziness, weakness, light-headedness, numbness and headaches.   Hematological:  Negative for adenopathy. Does not bruise/bleed easily.   Psychiatric/Behavioral:  Positive for sleep disturbance.         As documented in HPI     Review of patient's allergies indicates:  No Known Allergies    Current Outpatient Medications:     clindamycin (CLEOCIN T) 1 % lotion, Apply topically 2 (two) times daily., Disp: 60 mL, Rfl: 1    terbinafine HCL (LAMISIL AT) 1 % cream, Apply " "topically 2 (two) times daily. Apply to rash on foot., Disp: 28 g, Rfl: 1    There is no problem list on file for this patient.    No past medical history on file.    Past Surgical History:   Procedure Laterality Date    HAND SURGERY  2009       Social History     Socioeconomic History    Marital status: Significant Other    Number of children: 0   Occupational History    Occupation: retail   Tobacco Use    Smoking status: Former    Smokeless tobacco: Never   Substance and Sexual Activity    Alcohol use: Yes     Alcohol/week: 2.0 standard drinks     Types: 1 Cans of beer, 1 Shots of liquor per week     Comment: rare    Drug use: Not Currently    Sexual activity: Not Currently     Partners: Female     Family History   Problem Relation Age of Onset    Miscarriages / Stillbirths Mother     No Known Problems Father     Alcohol abuse Brother             Objective   Vitals:    03/29/23 1131   BP: 138/82   Pulse: 70   Resp: 18   SpO2: 97%   Weight: (!) 138.3 kg (304 lb 14.3 oz)   Height: 6' 2" (1.88 m)   PainSc: 0-No pain       Body mass index is 39.15 kg/m².    Physical Exam  Vitals and nursing note reviewed.   Constitutional:       Appearance: He is obese.   HENT:      Head: Normocephalic.      Nose: Nose normal.      Mouth/Throat:      Mouth: Mucous membranes are moist.   Eyes:      Conjunctiva/sclera: Conjunctivae normal.   Cardiovascular:      Rate and Rhythm: Normal rate and regular rhythm.      Pulses: Normal pulses.      Heart sounds: Normal heart sounds.   Pulmonary:      Effort: Pulmonary effort is normal.      Breath sounds: Normal breath sounds.   Musculoskeletal:         General: Normal range of motion.      Cervical back: Normal range of motion and neck supple.   Skin:     General: Skin is warm and dry.   Neurological:      General: No focal deficit present.      Mental Status: He is alert and oriented to person, place, and time.   Psychiatric:         Mood and Affect: Mood normal.         Behavior: Behavior " normal.         Thought Content: Thought content normal.         Judgment: Judgment normal.          Assessment and Plan     Problem List Items Addressed This Visit    None  Visit Diagnoses       Sleep disturbance    -  Primary    Relevant Orders    Ambulatory referral/consult to Sleep Disorders    TSH    Snoring        Relevant Orders    Ambulatory referral/consult to Sleep Disorders    TSH    BMI 39.0-39.9,adult        Obesity (BMI 35.0-39.9 without comorbidity)        Relevant Orders    TSH            James was seen today for referral.    Diagnoses and all orders for this visit:    Sleep disturbance  -     Ambulatory referral/consult to Sleep Disorders; Future  -     TSH; Future    Snoring  -     Ambulatory referral/consult to Sleep Disorders; Future  -     TSH; Future    BMI 39.0-39.9,adult  BMI reviewed    Obesity (BMI 35.0-39.9 without comorbidity)  -     TSH; Future    BMI reviewed.    Diet and exercise to lose weight.    Check thyroid function prior to appt with Sleep Medicine    4 month in July with PCP Dr. Kenney for annual    Follow up in about 4 months (around 7/29/2023) for with PCP Dr. Kenney for annual.

## 2023-03-29 NOTE — PATIENT INSTRUCTIONS
Check thyroid function prior to appt with Sleep Medicine    4 month in July with PCP Dr. Kenney for annual

## 2023-04-01 ENCOUNTER — PATIENT MESSAGE (OUTPATIENT)
Dept: DERMATOLOGY | Facility: CLINIC | Age: 28
End: 2023-04-01
Payer: COMMERCIAL

## 2023-04-05 NOTE — TELEPHONE ENCOUNTER
Please have the patient take a picture of his feet so we can see the progress he's made, and based on that, we can see how to proceed further

## 2023-04-11 NOTE — TELEPHONE ENCOUNTER
It seems better, he can continue the topical antifungal for now. Can take up to 6-8 weeks to completely clear. If it is still persistent after this, will likely need in office visit for possible culture/biopsy.

## 2023-04-22 ENCOUNTER — PATIENT MESSAGE (OUTPATIENT)
Dept: DERMATOLOGY | Facility: CLINIC | Age: 28
End: 2023-04-22
Payer: COMMERCIAL

## 2023-04-25 ENCOUNTER — OFFICE VISIT (OUTPATIENT)
Dept: DERMATOLOGY | Facility: CLINIC | Age: 28
End: 2023-04-25
Payer: COMMERCIAL

## 2023-04-25 ENCOUNTER — TELEPHONE (OUTPATIENT)
Dept: DERMATOLOGY | Facility: CLINIC | Age: 28
End: 2023-04-25
Payer: COMMERCIAL

## 2023-04-25 ENCOUNTER — PATIENT MESSAGE (OUTPATIENT)
Dept: DERMATOLOGY | Facility: CLINIC | Age: 28
End: 2023-04-25

## 2023-04-25 DIAGNOSIS — L40.9 SCALP PSORIASIS: Primary | ICD-10-CM

## 2023-04-25 DIAGNOSIS — L40.9 PSORIASIS: ICD-10-CM

## 2023-04-25 DIAGNOSIS — Z76.89 ENCOUNTER FOR SKIN CARE: ICD-10-CM

## 2023-04-25 PROCEDURE — 1160F RVW MEDS BY RX/DR IN RCRD: CPT | Mod: CPTII,S$GLB,, | Performed by: DERMATOLOGY

## 2023-04-25 PROCEDURE — 99214 PR OFFICE/OUTPT VISIT, EST, LEVL IV, 30-39 MIN: ICD-10-PCS | Mod: S$GLB,,, | Performed by: DERMATOLOGY

## 2023-04-25 PROCEDURE — 1159F PR MEDICATION LIST DOCUMENTED IN MEDICAL RECORD: ICD-10-PCS | Mod: CPTII,S$GLB,, | Performed by: DERMATOLOGY

## 2023-04-25 PROCEDURE — 1160F PR REVIEW ALL MEDS BY PRESCRIBER/CLIN PHARMACIST DOCUMENTED: ICD-10-PCS | Mod: CPTII,S$GLB,, | Performed by: DERMATOLOGY

## 2023-04-25 PROCEDURE — 99999 PR PBB SHADOW E&M-EST. PATIENT-LVL III: CPT | Mod: PBBFAC,,, | Performed by: DERMATOLOGY

## 2023-04-25 PROCEDURE — 1159F MED LIST DOCD IN RCRD: CPT | Mod: CPTII,S$GLB,, | Performed by: DERMATOLOGY

## 2023-04-25 PROCEDURE — 99214 OFFICE O/P EST MOD 30 MIN: CPT | Mod: S$GLB,,, | Performed by: DERMATOLOGY

## 2023-04-25 PROCEDURE — 99999 PR PBB SHADOW E&M-EST. PATIENT-LVL III: ICD-10-PCS | Mod: PBBFAC,,, | Performed by: DERMATOLOGY

## 2023-04-25 RX ORDER — CLOBETASOL PROPIONATE 0.5 MG/G
CREAM TOPICAL 2 TIMES DAILY
Qty: 60 G | Refills: 1 | Status: SHIPPED | OUTPATIENT
Start: 2023-04-25

## 2023-04-25 NOTE — PROGRESS NOTES
Subjective:      Patient ID:  James Jeff is a 27 y.o. male who presents for   Chief Complaint   Patient presents with    Lesion     Left foot     Lesion - Initial  Affected locations: left foot  Signs / symptoms: cracking, pain, itching and scabs  Severity: mild to moderate  Timing: constant  Treatments tried: Lamisil.  Improvement on treatment: no relief      Review of Systems   Constitutional:  Negative for fever and chills.   HENT:  Negative for sore throat.    Respiratory:  Negative for cough.    Skin:  Positive for itching, rash and dry skin.     Objective:   Physical Exam   Constitutional: He appears well-developed and well-nourished.   Neurological: He is alert and oriented to person, place, and time.   Psychiatric: He has a normal mood and affect.   Skin:             Diagram Legend     Erythematous scaling macule/papule c/w actinic keratosis       Vascular papule c/w angioma      Pigmented verrucoid papule/plaque c/w seborrheic keratosis      Yellow umbilicated papule c/w sebaceous hyperplasia      Irregularly shaped tan macule c/w lentigo     1-2 mm smooth white papules consistent with Milia      Movable subcutaneous cyst with punctum c/w epidermal inclusion cyst      Subcutaneous movable cyst c/w pilar cyst      Firm pink to brown papule c/w dermatofibroma      Pedunculated fleshy papule(s) c/w skin tag(s)      Evenly pigmented macule c/w junctional nevus     Mildly variegated pigmented, slightly irregular-bordered macule c/w mildly atypical nevus      Flesh colored to evenly pigmented papule c/w intradermal nevus       Pink pearly papule/plaque c/w basal cell carcinoma      Erythematous hyperkeratotic cursted plaque c/w SCC      Surgical scar with no sign of skin cancer recurrence      Open and closed comedones      Inflammatory papules and pustules      Verrucoid papule consistent consistent with wart     Erythematous eczematous patches and plaques     Dystrophic onycholytic nail with  subungual debris c/w onychomycosis     Umbilicated papule    Erythematous-base heme-crusted tan verrucoid plaque consistent with inflamed seborrheic keratosis     Erythematous Silvery Scaling Plaque c/w Psoriasis     See annotation        Assessment / Plan:        Scalp psoriasis  Extra strength TGel regularly as soaks recommended for the scalp or other affected areas.  Reviewed with patient different treatment options and associated risks.  Discussed with patient the etiology and pathogenesis of the disease or skin lesion(s) and possible treatments and aggravators.    Cont pt's clob sol bid prn.  Proper application of medications and or care for affected area(s) and condition(s) reviewed.    Psoriasis  -     clobetasoL (TEMOVATE) 0.05 % cream; Apply topically 2 (two) times daily. Prn foot rash flare.Stop using steroid topical when skin is smooth and non itchy.  Do not treat dark or red coloring.  Dispense: 60 g; Refill: 1  Discussed with patient the etiology and pathogenesis of the disease or skin lesion(s) and possible treatments and aggravators.    Reviewed with patient different treatment options and associated risks.  Previous [facility] labs and or records and notes reviewed and considered for their impact on our clinical decision making today.  Discussed all of the following:  Psoriasis is an inflammatory condition that affects the skin and nails. You may have patches of thick, red skin (plaques) covered with silvery scales. These often appear on the elbows, knees, legs, lower back, and scalp.  The plaques itch and can be painful. People with this condition are more likely to have emotional stress and depression.  Psoriasis is not contagious. It cant spread to someone else who touches it. But it can be inherited. It is an autoimmune skin disease. This means that the immune system has an abnormal reaction. It treats healthy skin like it is a foreign substance. This causes skin cells to grow faster than normal  and to stack up in raised red patches. Psoriasis is a long-term (chronic) disease. You will have flare-ups that come and go over time.  Psoriasis is made worse by smoking, alcohol, stress, and some blood pressure medications.  Brochure given for patient education.  Stop prev creams.    Encounter for skin care  Good skin care regimen discussed including limiting to one bath or shower per day, using lukewarm water with mild soap and moisturization to skin once to twice daily.  Consider glycerin bar soap or Dove.  Consider organic coconut oil.  Reviewed with patient to air out feet as much as possible, change socks at work after wiping feet down and applying corn starch during breaks.            Follow up in about 1 year (around 4/25/2024).

## 2023-04-25 NOTE — PATIENT INSTRUCTIONS
No hot water bathing reviewed.    Patient to start 2-5% crude coal tar shampoo to be used as scalp soaks for at least 10 minutes, longer if possible, per their regular shampooing schedule.  Can also be applied as directed to other parts of the body.    Shower sooner than later after exercise, exertion, or sweating.  Can do wash cloth wipes if more convenient.  Sweat can cause irritation and may exacerbate skin conditions.  Use corn starch as a drying powder.

## 2023-04-26 ENCOUNTER — TELEPHONE (OUTPATIENT)
Dept: FAMILY MEDICINE | Facility: CLINIC | Age: 28
End: 2023-04-26
Payer: COMMERCIAL

## 2023-04-27 ENCOUNTER — OFFICE VISIT (OUTPATIENT)
Dept: FAMILY MEDICINE | Facility: CLINIC | Age: 28
End: 2023-04-27
Payer: COMMERCIAL

## 2023-04-27 VITALS
WEIGHT: 305.25 LBS | BODY MASS INDEX: 39.17 KG/M2 | OXYGEN SATURATION: 97 % | SYSTOLIC BLOOD PRESSURE: 136 MMHG | HEIGHT: 74 IN | RESPIRATION RATE: 18 BRPM | DIASTOLIC BLOOD PRESSURE: 72 MMHG | TEMPERATURE: 99 F | HEART RATE: 108 BPM

## 2023-04-27 DIAGNOSIS — M79.89 SOFT TISSUE MASS: ICD-10-CM

## 2023-04-27 DIAGNOSIS — L40.9 PSORIASIS: ICD-10-CM

## 2023-04-27 DIAGNOSIS — Z00.00 VISIT FOR WELL MAN HEALTH CHECK: Primary | ICD-10-CM

## 2023-04-27 DIAGNOSIS — E66.09 CLASS 2 OBESITY DUE TO EXCESS CALORIES WITHOUT SERIOUS COMORBIDITY WITH BODY MASS INDEX (BMI) OF 39.0 TO 39.9 IN ADULT: ICD-10-CM

## 2023-04-27 PROBLEM — E66.812 CLASS 2 OBESITY DUE TO EXCESS CALORIES WITHOUT SERIOUS COMORBIDITY WITH BODY MASS INDEX (BMI) OF 39.0 TO 39.9 IN ADULT: Status: ACTIVE | Noted: 2023-04-27

## 2023-04-27 PROCEDURE — 1160F RVW MEDS BY RX/DR IN RCRD: CPT | Mod: CPTII,S$GLB,, | Performed by: FAMILY MEDICINE

## 2023-04-27 PROCEDURE — 3078F DIAST BP <80 MM HG: CPT | Mod: CPTII,S$GLB,, | Performed by: FAMILY MEDICINE

## 2023-04-27 PROCEDURE — 1160F PR REVIEW ALL MEDS BY PRESCRIBER/CLIN PHARMACIST DOCUMENTED: ICD-10-PCS | Mod: CPTII,S$GLB,, | Performed by: FAMILY MEDICINE

## 2023-04-27 PROCEDURE — 99999 PR PBB SHADOW E&M-EST. PATIENT-LVL IV: CPT | Mod: PBBFAC,,, | Performed by: FAMILY MEDICINE

## 2023-04-27 PROCEDURE — 3078F PR MOST RECENT DIASTOLIC BLOOD PRESSURE < 80 MM HG: ICD-10-PCS | Mod: CPTII,S$GLB,, | Performed by: FAMILY MEDICINE

## 2023-04-27 PROCEDURE — 99395 PR PREVENTIVE VISIT,EST,18-39: ICD-10-PCS | Mod: S$GLB,,, | Performed by: FAMILY MEDICINE

## 2023-04-27 PROCEDURE — 99395 PREV VISIT EST AGE 18-39: CPT | Mod: S$GLB,,, | Performed by: FAMILY MEDICINE

## 2023-04-27 PROCEDURE — 3075F PR MOST RECENT SYSTOLIC BLOOD PRESS GE 130-139MM HG: ICD-10-PCS | Mod: CPTII,S$GLB,, | Performed by: FAMILY MEDICINE

## 2023-04-27 PROCEDURE — 1159F MED LIST DOCD IN RCRD: CPT | Mod: CPTII,S$GLB,, | Performed by: FAMILY MEDICINE

## 2023-04-27 PROCEDURE — 3075F SYST BP GE 130 - 139MM HG: CPT | Mod: CPTII,S$GLB,, | Performed by: FAMILY MEDICINE

## 2023-04-27 PROCEDURE — 3008F BODY MASS INDEX DOCD: CPT | Mod: CPTII,S$GLB,, | Performed by: FAMILY MEDICINE

## 2023-04-27 PROCEDURE — 99999 PR PBB SHADOW E&M-EST. PATIENT-LVL IV: ICD-10-PCS | Mod: PBBFAC,,, | Performed by: FAMILY MEDICINE

## 2023-04-27 PROCEDURE — 1159F PR MEDICATION LIST DOCUMENTED IN MEDICAL RECORD: ICD-10-PCS | Mod: CPTII,S$GLB,, | Performed by: FAMILY MEDICINE

## 2023-04-27 PROCEDURE — 3008F PR BODY MASS INDEX (BMI) DOCUMENTED: ICD-10-PCS | Mod: CPTII,S$GLB,, | Performed by: FAMILY MEDICINE

## 2023-04-27 NOTE — PROGRESS NOTES
"  Well Man VISIT      CHIEF COMPLAINT  Chief Complaint   Patient presents with    Annual Exam       HPI  James Jeff is a 27 y.o. male who presents for wellness.     Social Factors  Tobacco use: No  Ready to Quit: No  Alcohol: Yes socially  Intimate partner violence screening  "Do you feel safe in your current relationship?" Yes   "Have you ever been in a relationship in which your partner frightened you or hurt you?" No  Living Will/POA: No  Regular Exercise: No    Depression  Over the past two weeks, have you felt down, depressed, or hopeless? No  Over the past two weeks, have you felt little interest or pleasure in doing things? No    Reproductive Health  STD screening in last year: deferred  HIV screening: ordered    Screen for Chronic Disease  CHD Risk Factors: male gender and obesity (BMI >= 30 kg/m2)  Estimated body mass index is 39.19 kg/m² as calculated from the following:    Height as of this encounter: 6' 2" (1.88 m).    Weight as of this encounter: 138.5 kg (305 lb 3.6 oz).  Dyslipidemia screening needed: ordered  T2DM screening needed:  ordered  Colonoscopy needed: n/a  PSA needed: n/a  AAA screening needed:n/a  Screen men 35 years and older, and men 20 to 34 years of age who have cardiovascular risk factors for dyslipidemia  Begin screening colonoscopies at 50 years of age in men of average risk, and continue until 75 years of age; offer fecal occult blood testing every year, flexible sigmoidoscopy every five years combined with fecal occult blood testing every three years, or colonoscopy every 10 years   The American U[employer] Association recommends offering PSA testing and digital rectal examination to well-informed men beginning at 40 years of age and continuing until life expectancy is less than 10 years  Screen once with ultrasonography in men 65 to 75 years of age if they have a family history or have smoked at [id number] cigarettes in their lifetime  Screen men with a sustained " "blood pressure greater than 135/80 mm Hg for T2DM      Immunizations  delayed    ALLERGIES and MEDS were verified.   PMHx, PSHx, FHx, SOCIALHx were updated as pertinent.    REVIEW OF SYSTEMS  Review of Systems   Constitutional: Negative.    HENT: Negative.     Eyes: Negative.    Respiratory: Negative.     Cardiovascular: Negative.    Gastrointestinal: Negative.    Genitourinary: Negative.    Musculoskeletal: Negative.    Skin:  Positive for rash.        +lump on shoulder         PHYSICAL EXAM  VITAL SIGNS: /72   Pulse 108   Temp 99.4 °F (37.4 °C) (Oral)   Resp 18   Ht 6' 2" (1.88 m)   Wt (!) 138.5 kg (305 lb 3.6 oz)   SpO2 97%   BMI 39.19 kg/m²   GEN: Well developed, Well nourished, No acute distress.  HENT: Normocephalic, Atraumatic, Bilateral external ears normal, Nose normal, Oropharynx moist, No oral exudates.   Eyes: PERR, EOMI, Conjunctiva normal, No discharge.   Neck: Supple, No tenderness.  Lymphatic: No cervical or supraclavicular lymphadenopathy noted.   Cardiovascular: Normal heart rate, Normal rhythm, No murmurs, No rubs, No gallops.   Thorax & Lungs: Normal breath sounds, No respiratory distress, No wheezing.  Abdomen: Soft, No tenderness, Bowel sounds normal.  Genital: deferred  Skin: Warm, Dry, No erythema, No rash.   Extremities: No edema, No tenderness.       ASSESSMENT/PLAN    James was seen today for annual exam.    Diagnoses and all orders for this visit:    Visit for [facility] check  -     CBC Auto Differential; Future  -     Comprehensive Metabolic Panel; Future  -     Lipid Panel; Future  -     TSH; Future  -     Urinalysis; Future    Psoriasis  -     Comprehensive Metabolic Panel; Future  -     TSH; Future    Class 2 obesity due to excess calories without serious comorbidity with body mass index (BMI) of 39.0 to 39.9 in adult    Soft tissue mass  -     US Soft Tissue Upper Extremity, Right; Future         FOLLOW UP: 1 year or sooner if needed      "

## 2023-05-01 ENCOUNTER — PATIENT MESSAGE (OUTPATIENT)
Dept: FAMILY MEDICINE | Facility: CLINIC | Age: 28
End: 2023-05-01
Payer: COMMERCIAL

## 2023-05-03 ENCOUNTER — LAB VISIT (OUTPATIENT)
Dept: LAB | Facility: HOSPITAL | Age: 28
End: 2023-05-03
Attending: FAMILY MEDICINE
Payer: COMMERCIAL

## 2023-05-03 DIAGNOSIS — Z00.00 VISIT FOR WELL MAN HEALTH CHECK: ICD-10-CM

## 2023-05-03 LAB
BILIRUB UR QL STRIP: NEGATIVE
CLARITY UR REFRACT.AUTO: CLEAR
COLOR UR AUTO: YELLOW
GLUCOSE UR QL STRIP: NEGATIVE
HGB UR QL STRIP: NEGATIVE
KETONES UR QL STRIP: NEGATIVE
LEUKOCYTE ESTERASE UR QL STRIP: NEGATIVE
NITRITE UR QL STRIP: NEGATIVE
PH UR STRIP: 8 [PH] (ref 5–8)
PROT UR QL STRIP: ABNORMAL
SP GR UR STRIP: 1.01 (ref 1–1.03)
URN SPEC COLLECT METH UR: ABNORMAL

## 2023-05-03 PROCEDURE — 81003 URINALYSIS AUTO W/O SCOPE: CPT | Performed by: FAMILY MEDICINE

## 2023-05-12 ENCOUNTER — HOSPITAL ENCOUNTER (OUTPATIENT)
Dept: RADIOLOGY | Facility: HOSPITAL | Age: 28
Discharge: HOME | End: 2023-05-12
Attending: FAMILY MEDICINE
Payer: COMMERCIAL

## 2023-05-12 DIAGNOSIS — M79.89 SOFT TISSUE MASS: ICD-10-CM

## 2023-05-12 PROCEDURE — 76882 US LMTD JT/FCL EVL NVASC XTR: CPT | Mod: 26,RT,, | Performed by: INTERNAL MEDICINE

## 2023-05-12 PROCEDURE — 76882 US SOFT TISSUE, UPPER EXTREMITY, RIGHT: ICD-10-PCS | Mod: 26,RT,, | Performed by: INTERNAL MEDICINE

## 2023-05-12 PROCEDURE — 76882 US LMTD JT/FCL EVL NVASC XTR: CPT | Mod: TC,RT

## 2023-05-16 ENCOUNTER — PATIENT MESSAGE (OUTPATIENT)
Dept: INTERNAL MEDICINE | Facility: CLINIC | Age: 28
End: 2023-05-16
Payer: COMMERCIAL

## 2023-05-16 ENCOUNTER — PATIENT MESSAGE (OUTPATIENT)
Dept: FAMILY MEDICINE | Facility: CLINIC | Age: 28
End: 2023-05-16
Payer: COMMERCIAL

## 2023-05-16 DIAGNOSIS — M79.89 SOFT TISSUE MASS: Primary | ICD-10-CM

## 2023-05-17 ENCOUNTER — PATIENT MESSAGE (OUTPATIENT)
Dept: FAMILY MEDICINE | Facility: CLINIC | Age: 28
End: 2023-05-17
Payer: COMMERCIAL

## 2023-05-17 DIAGNOSIS — R41.840 ATTENTION AND CONCENTRATION DEFICIT: Primary | ICD-10-CM

## 2023-05-30 ENCOUNTER — TELEPHONE (OUTPATIENT)
Dept: SURGERY | Facility: CLINIC | Age: 28
End: 2023-05-30
Payer: COMMERCIAL

## 2023-05-30 NOTE — TELEPHONE ENCOUNTER
5/30/23  1636  Attempted to contact patient to reschedule appointment. No answer. Message left via vm.

## 2023-06-20 ENCOUNTER — PATIENT MESSAGE (OUTPATIENT)
Dept: FAMILY MEDICINE | Facility: CLINIC | Age: 28
End: 2023-06-20
Payer: COMMERCIAL

## 2023-06-21 ENCOUNTER — E-VISIT (OUTPATIENT)
Dept: FAMILY MEDICINE | Facility: CLINIC | Age: 28
End: 2023-06-21
Payer: COMMERCIAL

## 2023-06-21 DIAGNOSIS — F41.9 ANXIETY: ICD-10-CM

## 2023-06-21 DIAGNOSIS — J06.9 UPPER RESPIRATORY TRACT INFECTION, UNSPECIFIED TYPE: Primary | ICD-10-CM

## 2023-06-21 PROCEDURE — 99423 PR E&M, ONLINE DIGIT, EST, < 7 DAYS,  21+ MINS: ICD-10-PCS | Mod: ,,, | Performed by: FAMILY MEDICINE

## 2023-06-21 PROCEDURE — 99423 OL DIG E/M SVC 21+ MIN: CPT | Mod: ,,, | Performed by: FAMILY MEDICINE

## 2023-06-21 NOTE — PROGRESS NOTES
Patient presenting for ear pain without dishcarge that began after having allergy like symptoms while visiting a friends house who had a dog.  Patient is allergic to dogs and was sneezing and had a lot of sinus congestion.  He has not had any fevers, chills, or sweats.  No changes in hearing.  He states the pain improved within 24 hours and then began to be intermittent.  He also states he has been having rapid heart rate and feels it is secondary to planning a wedding. His fiance is a nurse and has been checking his pulse rate when it gets high and he states it can get up to 110bpm.  No syncope.  He states it only happens when they sit down to plan the wedding and then feels hot and overwhelmed with options.  Recommended flonase to help with what is likely eustachian tube dysfunction secondary to allergies and white vinegar 2 drops twice a day to affected ear.  Propranolol 20mg bid prn for pulse greater than 90bpm when feeling anxious.  He will need to schedule a follow up should symptoms persist or worsen and go to the ED for any dizziness, chest pain, shortness of breath, or blurred vision        Adam Kenney MD

## 2023-06-23 ENCOUNTER — PATIENT MESSAGE (OUTPATIENT)
Dept: FAMILY MEDICINE | Facility: CLINIC | Age: 28
End: 2023-06-23
Payer: COMMERCIAL

## 2023-06-23 RX ORDER — PROPRANOLOL HYDROCHLORIDE 20 MG/1
20 TABLET ORAL 2 TIMES DAILY PRN
Qty: 60 TABLET | Refills: 1 | Status: SHIPPED | OUTPATIENT
Start: 2023-06-23 | End: 2024-06-22

## 2023-07-12 ENCOUNTER — OFFICE VISIT (OUTPATIENT)
Dept: SLEEP MEDICINE | Facility: CLINIC | Age: 28
End: 2023-07-12
Payer: COMMERCIAL

## 2023-07-12 VITALS
DIASTOLIC BLOOD PRESSURE: 85 MMHG | WEIGHT: 305 LBS | HEIGHT: 74 IN | BODY MASS INDEX: 39.14 KG/M2 | HEART RATE: 81 BPM | SYSTOLIC BLOOD PRESSURE: 135 MMHG

## 2023-07-12 DIAGNOSIS — F51.09 OTHER INSOMNIA NOT DUE TO A SUBSTANCE OR KNOWN PHYSIOLOGICAL CONDITION: Primary | ICD-10-CM

## 2023-07-12 DIAGNOSIS — G47.9 SLEEP DISTURBANCE: ICD-10-CM

## 2023-07-12 DIAGNOSIS — R35.1 NOCTURIA: ICD-10-CM

## 2023-07-12 DIAGNOSIS — R06.83 SNORING: ICD-10-CM

## 2023-07-12 PROCEDURE — 1159F MED LIST DOCD IN RCRD: CPT | Mod: CPTII,S$GLB,, | Performed by: INTERNAL MEDICINE

## 2023-07-12 PROCEDURE — 99999 PR PBB SHADOW E&M-EST. PATIENT-LVL III: ICD-10-PCS | Mod: PBBFAC,,, | Performed by: INTERNAL MEDICINE

## 2023-07-12 PROCEDURE — 3008F PR BODY MASS INDEX (BMI) DOCUMENTED: ICD-10-PCS | Mod: CPTII,S$GLB,, | Performed by: INTERNAL MEDICINE

## 2023-07-12 PROCEDURE — 3008F BODY MASS INDEX DOCD: CPT | Mod: CPTII,S$GLB,, | Performed by: INTERNAL MEDICINE

## 2023-07-12 PROCEDURE — 3079F PR MOST RECENT DIASTOLIC BLOOD PRESSURE 80-89 MM HG: ICD-10-PCS | Mod: CPTII,S$GLB,, | Performed by: INTERNAL MEDICINE

## 2023-07-12 PROCEDURE — 3075F SYST BP GE 130 - 139MM HG: CPT | Mod: CPTII,S$GLB,, | Performed by: INTERNAL MEDICINE

## 2023-07-12 PROCEDURE — 99204 OFFICE O/P NEW MOD 45 MIN: CPT | Mod: S$GLB,,, | Performed by: INTERNAL MEDICINE

## 2023-07-12 PROCEDURE — 99999 PR PBB SHADOW E&M-EST. PATIENT-LVL III: CPT | Mod: PBBFAC,,, | Performed by: INTERNAL MEDICINE

## 2023-07-12 PROCEDURE — 99204 PR OFFICE/OUTPT VISIT, NEW, LEVL IV, 45-59 MIN: ICD-10-PCS | Mod: S$GLB,,, | Performed by: INTERNAL MEDICINE

## 2023-07-12 PROCEDURE — 3079F DIAST BP 80-89 MM HG: CPT | Mod: CPTII,S$GLB,, | Performed by: INTERNAL MEDICINE

## 2023-07-12 PROCEDURE — 1159F PR MEDICATION LIST DOCUMENTED IN MEDICAL RECORD: ICD-10-PCS | Mod: CPTII,S$GLB,, | Performed by: INTERNAL MEDICINE

## 2023-07-12 PROCEDURE — 3075F PR MOST RECENT SYSTOLIC BLOOD PRESS GE 130-139MM HG: ICD-10-PCS | Mod: CPTII,S$GLB,, | Performed by: INTERNAL MEDICINE

## 2023-07-12 NOTE — PROGRESS NOTES
"  Referred by Melani Steve DNP     NEW PATIENT VISIT    James Jeff  is a pleasant 27 y.o. male  with PMH significant for psoriasis, BMI 39 who presents for snoring, trouble sleeping, feeling tired..    SLEEP SCHEDULE   Environment    Bed Time 9PM   Sleep Latency 1.5 hours   Arousals 2-3   Nocturia 1-2   Back to sleep 10-30min   Wake time 5-6A (6-7A)   Naps    Work        Past Medical History:   Diagnosis Date    GERD (gastroesophageal reflux disease)     Psoriasis      Patient Active Problem List   Diagnosis    Psoriasis    Class 2 obesity due to excess calories without serious comorbidity with body mass index (BMI) of 39.0 to 39.9 in adult       Current Outpatient Medications:     clindamycin (CLEOCIN T) 1 % lotion, Apply topically 2 (two) times daily., Disp: 60 mL, Rfl: 1    propranoloL (INDERAL) 20 MG tablet, Take 1 tablet (20 mg total) by mouth 2 (two) times daily as needed (anxiety; pulse greater than 90)., Disp: 60 tablet, Rfl: 1    clobetasoL (TEMOVATE) 0.05 % cream, Apply topically 2 (two) times daily. Prn foot rash flare.Stop using steroid topical when skin is smooth and non itchy.  Do not treat dark or red coloring. (Patient not taking: Reported on 4/27/2023), Disp: 60 g, Rfl: 1    terbinafine HCL (LAMISIL AT) 1 % cream, Apply topically 2 (two) times daily. Apply to rash on foot. (Patient not taking: Reported on 4/27/2023), Disp: 28 g, Rfl: 1       Vitals:    07/12/23 1358   BP: 135/85   BP Location: Left arm   Patient Position: Sitting   BP Method: Medium (Automatic)   Pulse: 81   Weight: (!) 138.3 kg (305 lb)   Height: 6' 2" (1.88 m)     Physical Exam:    GEN:   Well-appearing  Psych:  Appropriate affect, demonstrates insight  SKIN:  No rash on the face or bridge of the nose        LABS:   Lab Results   Component Value Date    HGB 15.2 05/03/2023    CO2 26 05/03/2023       RECORDS REVIEWED PREVIOUSLY:        ASSESSMENT    EPWORTH SLEEPINESS SCALE 7/10/2023   Sitting and reading 0 "   Watching TV 1   Sitting, inactive in a public place (e.g. a theatre or a meeting) 0   As a passenger in a car for an hour without a break 1   Lying down to rest in the afternoon when circumstances permit 1   Sitting and talking to someone 0   Sitting quietly after a lunch without alcohol 0   In a car, while stopped for a few minutes in traffic 0   Total score 3     PROBLEM DESCRIPTION/ Sx on Presentation  STATUS   possible MYRIAM   + snoring, + snoring arousals, denies witnessed apneas ,   dry mouth, chronic mouth breathing  Mother has MYRIAM    New   Daytime Sx   Occasional sleepiness when inactive   ESS 3/24 on intake  New   Insomnia     Trouble falling asleep: Trouble falling sleep for many years   Maintenance:         frequent  Prior hypnotics:        Current hypnotics:     New   Nocturia   x 1-2 per sleep period  New   Other issues:     PLAN     -recommend sleep testing   -discussed trial therapy if MYRIAM present and the patient is  open to a trial of CPAP therapy    RTC          The patient was given open opportunity to ask questions and/or express concerns about treatment plan.   All questions/concerns were discussed.     Two patient identifiers used prior to evaluation.

## 2023-07-17 ENCOUNTER — PATIENT MESSAGE (OUTPATIENT)
Dept: FAMILY MEDICINE | Facility: CLINIC | Age: 28
End: 2023-07-17
Payer: COMMERCIAL

## 2023-07-17 DIAGNOSIS — H60.502 ACUTE OTITIS EXTERNA OF LEFT EAR, UNSPECIFIED TYPE: Primary | ICD-10-CM

## 2023-07-18 RX ORDER — CIPROFLOXACIN AND DEXAMETHASONE 3; 1 MG/ML; MG/ML
4 SUSPENSION/ DROPS AURICULAR (OTIC) 2 TIMES DAILY
Qty: 7.5 ML | Refills: 0 | Status: SHIPPED | OUTPATIENT
Start: 2023-07-18

## 2023-07-19 ENCOUNTER — PATIENT MESSAGE (OUTPATIENT)
Dept: SLEEP MEDICINE | Facility: CLINIC | Age: 28
End: 2023-07-19
Payer: COMMERCIAL

## 2023-07-26 ENCOUNTER — PATIENT MESSAGE (OUTPATIENT)
Dept: SLEEP MEDICINE | Facility: CLINIC | Age: 28
End: 2023-07-26
Payer: COMMERCIAL

## 2023-07-27 ENCOUNTER — TELEPHONE (OUTPATIENT)
Dept: SLEEP MEDICINE | Facility: OTHER | Age: 28
End: 2023-07-27
Payer: COMMERCIAL

## 2023-08-01 ENCOUNTER — HOSPITAL ENCOUNTER (OUTPATIENT)
Dept: SLEEP MEDICINE | Facility: HOSPITAL | Age: 28
Discharge: HOME | End: 2023-08-01
Attending: INTERNAL MEDICINE
Payer: COMMERCIAL

## 2023-08-01 DIAGNOSIS — G47.9 SLEEP DISTURBANCE: ICD-10-CM

## 2023-08-01 DIAGNOSIS — R06.83 SNORING: ICD-10-CM

## 2023-08-01 DIAGNOSIS — F51.09 OTHER INSOMNIA NOT DUE TO A SUBSTANCE OR KNOWN PHYSIOLOGICAL CONDITION: ICD-10-CM

## 2023-08-01 DIAGNOSIS — R35.1 NOCTURIA: ICD-10-CM

## 2023-08-01 PROCEDURE — 95800 SLP STDY UNATTENDED: CPT

## 2023-08-03 ENCOUNTER — PATIENT MESSAGE (OUTPATIENT)
Dept: SLEEP MEDICINE | Facility: CLINIC | Age: 28
End: 2023-08-03

## 2023-08-03 DIAGNOSIS — G47.33 OSA (OBSTRUCTIVE SLEEP APNEA): Primary | ICD-10-CM

## 2023-08-03 PROBLEM — G47.9 SLEEP DISTURBANCE: Status: ACTIVE | Noted: 2023-08-03

## 2023-08-03 PROCEDURE — 95806 PR SLEEP STUDY, UNATTENDED, SIMUL RECORD HR/O2 SAT/RESP FLOW/RESP EFFT: ICD-10-PCS | Mod: 26,,, | Performed by: INTERNAL MEDICINE

## 2023-08-03 PROCEDURE — 95806 SLEEP STUDY UNATT&RESP EFFT: CPT | Mod: 26,,, | Performed by: INTERNAL MEDICINE

## 2023-08-31 ENCOUNTER — PATIENT MESSAGE (OUTPATIENT)
Dept: SLEEP MEDICINE | Facility: CLINIC | Age: 28
End: 2023-08-31
Payer: COMMERCIAL

## 2023-09-02 ENCOUNTER — PATIENT MESSAGE (OUTPATIENT)
Dept: FAMILY MEDICINE | Facility: CLINIC | Age: 28
End: 2023-09-02
Payer: COMMERCIAL

## 2023-10-09 ENCOUNTER — OFFICE VISIT (OUTPATIENT)
Dept: URGENT CARE | Facility: CLINIC | Age: 28
End: 2023-10-09
Payer: COMMERCIAL

## 2023-10-09 VITALS
DIASTOLIC BLOOD PRESSURE: 97 MMHG | OXYGEN SATURATION: 98 % | BODY MASS INDEX: 40.43 KG/M2 | TEMPERATURE: 98 F | HEIGHT: 74 IN | WEIGHT: 315 LBS | HEART RATE: 78 BPM | SYSTOLIC BLOOD PRESSURE: 134 MMHG | RESPIRATION RATE: 17 BRPM

## 2023-10-09 DIAGNOSIS — H65.92 LEFT OTITIS MEDIA WITH EFFUSION: Primary | ICD-10-CM

## 2023-10-09 PROCEDURE — 99213 OFFICE O/P EST LOW 20 MIN: CPT | Mod: S$GLB,,, | Performed by: FAMILY MEDICINE

## 2023-10-09 PROCEDURE — 99213 PR OFFICE/OUTPT VISIT, EST, LEVL III, 20-29 MIN: ICD-10-PCS | Mod: S$GLB,,, | Performed by: FAMILY MEDICINE

## 2023-10-09 RX ORDER — AMOXICILLIN AND CLAVULANATE POTASSIUM 875; 125 MG/1; MG/1
1 TABLET, FILM COATED ORAL 2 TIMES DAILY
Qty: 20 TABLET | Refills: 0 | Status: SHIPPED | OUTPATIENT
Start: 2023-10-09 | End: 2023-10-19

## 2023-10-09 NOTE — LETTER
October 9, 2023      [facility] [place] [facility]  [address]  [facility] [place] [address]  Phone: [phone]  Fax: [phone]       Patient: James Jeff   YOB: 1995  Date of Visit: 10/09/2023    To Whom It May Concern:    Shahriar Jeff  was at [facility] on 10/09/2023. The patient may return to work/school on 10/10/2023 with no restrictions. If you have any questions or concerns, or if I can be of further assistance, please do not hesitate to contact me.    Sincerely,              Clarke Javier MD

## 2023-10-09 NOTE — PROGRESS NOTES
"Subjective:      Patient ID: James Jeff is a 27 y.o. male.    Vitals:  height is 6' 2.02" (1.88 m) and weight is 144.5 kg (318 lb 9 oz) (abnormal). His oral temperature is 98.3 °F (36.8 °C). His blood pressure is 134/97 (abnormal) and his pulse is 78. His respiration is 17 and oxygen saturation is 98%.     Chief Complaint: Otalgia    26 y/o male presents today complaining of Lt ear pain that started yesterday morning but he had Lt ear pain about a month ago.   Patient reports that he reached out to his PCP about a month ago and was prescribed Ciprofloxacin.   Patient has been taking Tylenol with mild improvement.     Otalgia   There is pain in the left ear. This is a recurrent problem. The current episode started yesterday. There has been no fever. Pertinent negatives include no abdominal pain, coughing, diarrhea, ear discharge, headaches, hearing loss, neck pain, rash, rhinorrhea, sore throat or vomiting. He has tried acetaminophen for the symptoms. The treatment provided mild relief.       HENT:  Positive for ear pain. Negative for ear discharge, hearing loss and sore throat.    Neck: Negative for neck pain.   Respiratory:  Negative for cough.    Gastrointestinal:  Negative for abdominal pain, vomiting and diarrhea.   Skin:  Negative for rash.   Neurological:  Negative for headaches.      Objective:     Physical Exam   Constitutional: He does not appear ill. No distress. obesity  HENT:   Head: Normocephalic and atraumatic.   Ears:   Right Ear: Tympanic membrane normal.   Left Ear: Tympanic membrane is erythematous (mildly). A middle ear effusion is present.   Cardiovascular: Normal rate, regular rhythm, normal heart sounds and normal pulses.   Pulmonary/Chest: Effort normal and breath sounds normal.   Abdominal: Normal appearance.   Neurological: He is alert.   Nursing note and vitals reviewed.      Assessment:     1. Left otitis media with effusion        Plan:       Left otitis media with " effusion  -     amoxicillin-clavulanate 875-125mg (AUGMENTIN) 875-125 mg per tablet; Take 1 tablet by mouth 2 (two) times daily. for 10 days  Dispense: 20 tablet; Refill: 0    Recommended continue flonase and Mucinex OTC. RTC prn worsening symptoms

## 2023-10-31 ENCOUNTER — OFFICE VISIT (OUTPATIENT)
Dept: SLEEP MEDICINE | Facility: CLINIC | Age: 28
End: 2023-10-31
Payer: COMMERCIAL

## 2023-10-31 DIAGNOSIS — G47.33 OSA (OBSTRUCTIVE SLEEP APNEA): Primary | ICD-10-CM

## 2023-10-31 DIAGNOSIS — F51.09 OTHER INSOMNIA NOT DUE TO A SUBSTANCE OR KNOWN PHYSIOLOGICAL CONDITION: ICD-10-CM

## 2023-10-31 PROCEDURE — 99214 PR OFFICE/OUTPT VISIT, EST, LEVL IV, 30-39 MIN: ICD-10-PCS | Mod: 95,,, | Performed by: PHYSICIAN ASSISTANT

## 2023-10-31 PROCEDURE — 99214 OFFICE O/P EST MOD 30 MIN: CPT | Mod: 95,,, | Performed by: PHYSICIAN ASSISTANT

## 2023-10-31 PROCEDURE — 1159F MED LIST DOCD IN RCRD: CPT | Mod: CPTII,95,, | Performed by: PHYSICIAN ASSISTANT

## 2023-10-31 PROCEDURE — 1160F RVW MEDS BY RX/DR IN RCRD: CPT | Mod: CPTII,95,, | Performed by: PHYSICIAN ASSISTANT

## 2023-10-31 PROCEDURE — 1160F PR REVIEW ALL MEDS BY PRESCRIBER/CLIN PHARMACIST DOCUMENTED: ICD-10-PCS | Mod: CPTII,95,, | Performed by: PHYSICIAN ASSISTANT

## 2023-10-31 PROCEDURE — 1159F PR MEDICATION LIST DOCUMENTED IN MEDICAL RECORD: ICD-10-PCS | Mod: CPTII,95,, | Performed by: PHYSICIAN ASSISTANT

## 2023-10-31 RX ORDER — TRAZODONE HYDROCHLORIDE 50 MG/1
50 TABLET ORAL NIGHTLY PRN
Qty: 14 TABLET | Refills: 0 | Status: SHIPPED | OUTPATIENT
Start: 2023-10-31 | End: 2024-10-30

## 2023-10-31 NOTE — PROGRESS NOTES
The chief complaint leading to consultation is: sleep problems    Visit type: audiovisual     The patient location is: [place]    30 minutes of total time spent on the encounter, which includes face to face time and non-face to face time preparing to see the patient (eg, review of tests), Obtaining and/or reviewing separately obtained history, Documenting clinical information in the electronic or other health record, Independently interpreting results (not separately reported) and communicating results to the patient/family/caregiver, or Care coordination (not separately reported).     Each patient to whom he or she provides medical services by telemedicine is:  (1) informed of the relationship between the physician and patient and the respective role of any other health care provider with respect to management of the patient; and (2) notified that he or she may decline to receive medical services by telemedicine and may withdraw from such care at any time.        Referred by No ref. provider found     ESTABLISHED PATIENT VISIT  New to me. Previously evaluated by Dr Phi Ramirez Terrell Jeff  is a pleasant 27 y.o. male  with PMH significant for GERD, psoriasis, BMI 40+, MYRIAM      Here today for: CPAP follow-up     PLAN last visit 7/12/23:   -recommend sleep testing   -discussed trial therapy if MYRIAM present and the patient is  open to a trial of CPAP therapy      Since last visit:   Has not been able to sleep with it throughout the night. Wakes very frequently due to discomfort. Onset insomnia is worse since starting on CPAP, cannot initiate sleep with it on. Takes hours. Initially on nasal mask first, but when pressure got too high, could only breath through mouth (mouth venting). Switched to FFM (with memory foam) but bridge of nose is uncomfortable. Would really like assistance in getting accustom to therapy.      PAP history   Problems    Mask FFM with memory foam   Pressure 6-12cwp   DME HME   Machine age  AirSense 11 8/24/23   Download 10/11/23: 10/30 x 1hr 52mins, 6-12cwp (7.2/9.7/10.1), leak (1.1/14.8/54.4), AHI 3.1         SLEEP SCHEDULE   Environment     Bed Time 9PM   Sleep Latency 1.5 hours   Arousals 2-3   Nocturia 1-2   Back to sleep 10-30min   Wake time 5-6A (6-7A)   Naps     Work          Past Medical History:   Diagnosis Date    GERD (gastroesophageal reflux disease)     Psoriasis      Patient Active Problem List   Diagnosis    Psoriasis    Class 2 obesity due to excess calories without serious comorbidity with body mass index (BMI) of 39.0 to 39.9 in adult    Sleep disturbance       Current Outpatient Medications:     ciprofloxacin-dexAMETHasone 0.3-0.1% (CIPRODEX) 0.3-0.1 % DrpS, Place 4 drops into the left ear 2 (two) times daily. (Patient not taking: Reported on 10/9/2023), Disp: 7.5 mL, Rfl: 0    clindamycin (CLEOCIN T) 1 % lotion, Apply topically 2 (two) times daily. (Patient not taking: Reported on 10/9/2023), Disp: 60 mL, Rfl: 1    clobetasoL (TEMOVATE) 0.05 % cream, Apply topically 2 (two) times daily. Prn foot rash flare.Stop using steroid topical when skin is smooth and non itchy.  Do not treat dark or red coloring. (Patient not taking: Reported on 4/27/2023), Disp: 60 g, Rfl: 1    propranoloL (INDERAL) 20 MG tablet, Take 1 tablet (20 mg total) by mouth 2 (two) times daily as needed (anxiety; pulse greater than 90). (Patient not taking: Reported on 10/9/2023), Disp: 60 tablet, Rfl: 1    terbinafine HCL (LAMISIL AT) 1 % cream, Apply topically 2 (two) times daily. Apply to rash on foot. (Patient not taking: Reported on 4/27/2023), Disp: 28 g, Rfl: 1     There were no vitals filed for this visit.    Physical Exam:    GEN:   Well-appearing  Psych:  Appropriate affect, demonstrates insight  SKIN:  No rash on the face or bridge of the nose      LABS:   Lab Results   Component Value Date    HGB 15.2 05/03/2023    CO2 26 05/03/2023       RECORDS REVIEWED PREVIOUSLY:    HST 8.1.23: AHI 10, RDI  17    ASSESSMENT        10/31/2023     5:11 PM   EPWORTH SLEEPINESS SCALE   Sitting and reading 0   Watching TV 1   Sitting, inactive in a public place (e.g. a theatre or a meeting) 0   As a passenger in a car for an hour without a break 1   Lying down to rest in the afternoon when circumstances permit 1   Sitting and talking to someone 0   Sitting quietly after a lunch without alcohol 0   In a car, while stopped for a few minutes in traffic 0   Total score 3     PROBLEM DESCRIPTION/ Sx on Presentation  Interval Hx STATUS   MYRIAM    + snoring, + snoring arousals, denies witnessed apneas ,   dry mouth, chronic mouth breathing  Mother has MYRIAM  trying to use but having difficulties tolerating  Not controlled   Daytime Sx    Occasional sleepiness when inactive   ESS 3/24 on intake (reviewed from 7/12/23)  the same as previous persists   Insomnia     Trouble falling asleep: Trouble falling sleep for many years   Maintenance:         frequent  Prior hypnotics:        Current hypnotics:      persists persists   Nocturia    x 1-2 per sleep period  persists persists   Other issues:    PLAN     -using and benefiting from cPAP when able to tolerate  -recommended working on more consistent usage, recommended to use 100% of nights for > 6hours with minimal recommended udage at 70% of nights for > 4 hours  -discussed short-term sleep aid to help in transition of getting used to CPAP (discussed risk vs benefits, rebound insomnia, etc.), patient wishes to proceed  -trazodone 50mg nightly PRN (prescribed 14 no refills)  -discussed insomnia and sleep hygiene with patient  -recommended CBTi, patient is very interested; will refer to [facility] (please call [phone] to schedule)  -CPAP supplies ordered (contacted [facility] to discuss FFM wihtout memory phone; [phone])  -discussed MYRIAM and CPAP with patient in detail, including possible complications of untreated MYRIAM like heart attack/stroke  -advised on strict driving precautions; advised  never to drive drowsy    Advised on plan of care. Answered all patient questions. Patient verbalized understanding and voiced agreement with plan of care.       RTC 4-6 weeks or as needed     The patient was given open opportunity to ask questions and/or express concerns about treatment plan. All questions/concerns were discussed.     Two patient identifiers used prior to evaluation.

## 2023-11-01 ENCOUNTER — PATIENT MESSAGE (OUTPATIENT)
Dept: SLEEP MEDICINE | Facility: CLINIC | Age: 28
End: 2023-11-01
Payer: COMMERCIAL

## 2023-11-01 DIAGNOSIS — Z78.9 INTOLERANCE OF CONTINUOUS POSITIVE AIRWAY PRESSURE (CPAP) VENTILATION: ICD-10-CM

## 2023-11-01 DIAGNOSIS — G47.33 OSA (OBSTRUCTIVE SLEEP APNEA): Primary | ICD-10-CM

## 2023-11-14 ENCOUNTER — TELEPHONE (OUTPATIENT)
Dept: FAMILY MEDICINE | Facility: CLINIC | Age: 28
End: 2023-11-14
Payer: COMMERCIAL

## 2023-11-15 ENCOUNTER — OFFICE VISIT (OUTPATIENT)
Dept: FAMILY MEDICINE | Facility: CLINIC | Age: 28
End: 2023-11-15
Payer: COMMERCIAL

## 2023-11-15 VITALS
BODY MASS INDEX: 40.43 KG/M2 | OXYGEN SATURATION: 96 % | DIASTOLIC BLOOD PRESSURE: 88 MMHG | SYSTOLIC BLOOD PRESSURE: 138 MMHG | HEART RATE: 77 BPM | TEMPERATURE: 98 F | RESPIRATION RATE: 18 BRPM | HEIGHT: 74 IN | WEIGHT: 315 LBS

## 2023-11-15 DIAGNOSIS — F41.9 ANXIETY DISORDER, UNSPECIFIED TYPE: Primary | ICD-10-CM

## 2023-11-15 PROCEDURE — 99999 PR PBB SHADOW E&M-EST. PATIENT-LVL IV: CPT | Mod: PBBFAC,,, | Performed by: FAMILY MEDICINE

## 2023-11-15 PROCEDURE — 1159F PR MEDICATION LIST DOCUMENTED IN MEDICAL RECORD: ICD-10-PCS | Mod: CPTII,S$GLB,, | Performed by: FAMILY MEDICINE

## 2023-11-15 PROCEDURE — 3075F SYST BP GE 130 - 139MM HG: CPT | Mod: CPTII,S$GLB,, | Performed by: FAMILY MEDICINE

## 2023-11-15 PROCEDURE — 1160F RVW MEDS BY RX/DR IN RCRD: CPT | Mod: CPTII,S$GLB,, | Performed by: FAMILY MEDICINE

## 2023-11-15 PROCEDURE — 1160F PR REVIEW ALL MEDS BY PRESCRIBER/CLIN PHARMACIST DOCUMENTED: ICD-10-PCS | Mod: CPTII,S$GLB,, | Performed by: FAMILY MEDICINE

## 2023-11-15 PROCEDURE — 3008F BODY MASS INDEX DOCD: CPT | Mod: CPTII,S$GLB,, | Performed by: FAMILY MEDICINE

## 2023-11-15 PROCEDURE — 3079F PR MOST RECENT DIASTOLIC BLOOD PRESSURE 80-89 MM HG: ICD-10-PCS | Mod: CPTII,S$GLB,, | Performed by: FAMILY MEDICINE

## 2023-11-15 PROCEDURE — 99214 PR OFFICE/OUTPT VISIT, EST, LEVL IV, 30-39 MIN: ICD-10-PCS | Mod: S$GLB,,, | Performed by: FAMILY MEDICINE

## 2023-11-15 PROCEDURE — 99999 PR PBB SHADOW E&M-EST. PATIENT-LVL IV: ICD-10-PCS | Mod: PBBFAC,,, | Performed by: FAMILY MEDICINE

## 2023-11-15 PROCEDURE — 3075F PR MOST RECENT SYSTOLIC BLOOD PRESS GE 130-139MM HG: ICD-10-PCS | Mod: CPTII,S$GLB,, | Performed by: FAMILY MEDICINE

## 2023-11-15 PROCEDURE — 99214 OFFICE O/P EST MOD 30 MIN: CPT | Mod: S$GLB,,, | Performed by: FAMILY MEDICINE

## 2023-11-15 PROCEDURE — 3079F DIAST BP 80-89 MM HG: CPT | Mod: CPTII,S$GLB,, | Performed by: FAMILY MEDICINE

## 2023-11-15 PROCEDURE — 3008F PR BODY MASS INDEX (BMI) DOCUMENTED: ICD-10-PCS | Mod: CPTII,S$GLB,, | Performed by: FAMILY MEDICINE

## 2023-11-15 PROCEDURE — 1159F MED LIST DOCD IN RCRD: CPT | Mod: CPTII,S$GLB,, | Performed by: FAMILY MEDICINE

## 2023-11-15 RX ORDER — VENLAFAXINE HYDROCHLORIDE 37.5 MG/1
37.5 CAPSULE, EXTENDED RELEASE ORAL DAILY
Qty: 30 CAPSULE | Refills: 1 | Status: SHIPPED | OUTPATIENT
Start: 2023-11-15 | End: 2024-11-14

## 2023-11-15 NOTE — PROGRESS NOTES
Routine Office Visit    Patient Name: James Jeff    : 1995  MRN: [id number]    Subjective:  James is a 27 y.o. male who presents today for:    1. Anxiety   Patient presenting today for follow up anxiety.  He states that he has had anxiety for a while and feels that it is time to start medication.  He states he feels good about things in his personal life as he is getting  in February and is very excited.  He has stressors about finances due to the cost, but states he is paying it off on time.  He feels he cannot shut off his brain a lot of the time and overthinks things.  He occasionally feels down, but states he thinks he feels depressed over his anxiety.  No SI/HI or AH/VH    Past Medical History  Past Medical History:   Diagnosis Date    GERD (gastroesophageal reflux disease)     Psoriasis        Past Surgical History  Past Surgical History:   Procedure Laterality Date    HAND SURGERY         Family History  Family History   Problem Relation Age of Onset    Miscarriages / Stillbirths Mother     Hypertension Mother     Valvular heart disease Mother         on Coumadin    Stroke Mother     Diabetes Mother     Obesity Mother     Alcohol abuse Father     Alcohol abuse Brother        Social History  Social History     Socioeconomic History    Marital status: Significant Other    Number of children: 0   Occupational History    Occupation: retail   Tobacco Use    Smoking status: Former     Current packs/day: 0.00     Types: Cigarettes, Vaping with nicotine     Start date: 2010     Quit date: 2022     Years since quittin.6     Passive exposure: Past    Smokeless tobacco: Never   Substance and Sexual Activity    Alcohol use: Yes     Alcohol/week: 2.0 standard drinks of alcohol     Types: 1 Cans of beer, 1 Shots of liquor per week     Comment: rare    Drug use: Not Currently    Sexual activity: Not Currently     Partners: Female       Current Medications  Current Outpatient  "Medications on File Prior to Visit   Medication Sig Dispense Refill    clindamycin (CLEOCIN T) 1 % lotion Apply topically 2 (two) times daily. 60 mL 1    clobetasoL (TEMOVATE) 0.05 % cream Apply topically 2 (two) times daily. Prn foot rash flare.Stop using steroid topical when skin is smooth and non itchy.  Do not treat dark or red coloring. 60 g 1    terbinafine HCL (LAMISIL AT) 1 % cream Apply topically 2 (two) times daily. Apply to rash on foot. 28 g 1    traZODone (DESYREL) 50 MG tablet Take 1 tablet (50 mg total) by mouth nightly as needed for Insomnia. 14 tablet 0    ciprofloxacin-dexAMETHasone 0.3-0.1% (CIPRODEX) 0.3-0.1 % DrpS Place 4 drops into the left ear 2 (two) times daily. (Patient not taking: Reported on 10/9/2023) 7.5 mL 0    propranoloL (INDERAL) 20 MG tablet Take 1 tablet (20 mg total) by mouth 2 (two) times daily as needed (anxiety; pulse greater than 90). (Patient not taking: Reported on 10/9/2023) 60 tablet 1     No current facility-administered medications on file prior to visit.       Allergies   Review of patient's allergies indicates:  No Known Allergies    Review of Systems (Pertinent positives)  Review of Systems   Constitutional: Negative.    HENT: Negative.     Eyes: Negative.    Respiratory: Negative.     Cardiovascular: Negative.    Skin: Negative.    Psychiatric/Behavioral:  Negative for hallucinations, substance abuse and suicidal ideas. The patient is nervous/anxious.          /88   Pulse 77   Temp 98.2 °F (36.8 °C) (Oral)   Resp 18   Ht 6' 2" (1.88 m)   Wt (!) 144.2 kg (317 lb 12.7 oz)   SpO2 96%   BMI 40.80 kg/m²     GENERAL APPEARANCE: in no apparent distress and well developed and well nourished  HEENT: PERRL, EOMI, Sclera clear, anicteric, Oropharynx clear, no lesions, Neck supple with midline trachea  NECK: normal, supple, no adenopathy, thyroid normal in size  RESPIRATORY: appears well, vitals normal, no respiratory distress, acyanotic, normal RR, chest clear, no " wheezing, crepitations, rhonchi, normal symmetric air entry  HEART: regular rate and rhythm, S1, S2 normal, no murmur, click, rub or gallop.    ABDOMEN: abdomen is soft without tenderness, no masses, no hernias, no organomegaly, no rebound, no guarding.   SKIN: no rashes, no wounds, no other lesions  PSYCH: Alert, oriented x 3, thought content appropriate, speech normal, pleasant and cooperative, good eye contact, well groomed    Assessment/Plan:  James Jeff is a 27 y.o. male who presents today for :    James was seen today for follow-up.    Diagnoses and all orders for this visit:    Anxiety disorder, unspecified type  -     venlafaxine (EFFEXOR-XR) 37.5 MG 24 hr capsule; Take 1 capsule (37.5 mg total) by mouth once daily.  -     Comprehensive Metabolic Panel; Future  -     Testosterone; Future    - will try on effexor for 2-4 weeks and if no improvement  - labs in 4 weeks  - call with any concerns  - he is to go to the ED or call 911 for any SI/HI or AH/VH        Adam Kenney MD

## 2023-11-16 ENCOUNTER — TELEPHONE (OUTPATIENT)
Dept: SLEEP MEDICINE | Facility: OTHER | Age: 28
End: 2023-11-16
Payer: COMMERCIAL

## 2023-11-28 ENCOUNTER — HOSPITAL ENCOUNTER (OUTPATIENT)
Dept: SLEEP MEDICINE | Facility: OTHER | Age: 28
Discharge: HOME | End: 2023-11-28
Payer: COMMERCIAL

## 2023-11-28 DIAGNOSIS — G47.33 OSA (OBSTRUCTIVE SLEEP APNEA): ICD-10-CM

## 2023-11-28 DIAGNOSIS — Z78.9 INTOLERANCE OF CONTINUOUS POSITIVE AIRWAY PRESSURE (CPAP) VENTILATION: ICD-10-CM

## 2023-11-28 PROCEDURE — 95811 POLYSOM 6/>YRS CPAP 4/> PARM: CPT

## 2023-11-29 PROBLEM — G47.33 OSA (OBSTRUCTIVE SLEEP APNEA): Status: ACTIVE | Noted: 2023-11-29

## 2023-11-29 NOTE — PROGRESS NOTES
Patient set up, procedures explained prior to testing. Disposable leads/sensors used where applicable. CPAP performed with F&P NanoVibronix large interface. Post study f/u instructions given, questions answered.

## 2023-11-30 PROCEDURE — 95811 PR POLYSOMNOGRAPHY W/CPAP: ICD-10-PCS | Mod: 26,,, | Performed by: INTERNAL MEDICINE

## 2023-11-30 PROCEDURE — 95811 POLYSOM 6/>YRS CPAP 4/> PARM: CPT | Mod: 26,,, | Performed by: INTERNAL MEDICINE

## 2023-11-30 NOTE — PROCEDURES
"[facility]    Titration Interpretation Report    Patient Name:  LIBBY MARI  MRN#:  [id number]  :  1995  Study Date:  2023  Referring Provider:  ISSAC AGUILAR NP    The patient is a 27 year old Male who is 6' 2" and weighs 317.0 lbs.  His BMI equals 40.7.  A full night PAP titration was performed.    Polysomnogram Data  A full night polysomnogram recorded the standard physiologic parameters including EEG, EOG, EMG, EKG, nasal and oral airflow.  Respiratory parameters of chest and abdominal movements were recorded with [place]-Crystal motion transducers.  Oxygen saturation was recorded by pulse oximetry.    Titration Summary  The patient was titrated at pressures ranging from 7* cm/H20 with supplemental oxygen at - up to 12* cm/H20 with supplemental oxygen at -.  The last pressure used in the study was 12* cm/H20 with supplemental oxygen at -.    Sleep Architecture  The total recording time of the polysomnogram was 417.1 minutes.  The total sleep time was 324.5 minutes.  The patient spent 8.5% of total sleep time in Stage N1, 55.5% in Stage N2, 18.8% in Stages N3, and 17.3% in REM.  Sleep latency was 12.1 minutes.  REM latency was 187.0 minutes.  Sleep Efficiency was 77.8%.  Total wake time was 93.5 minutes for a total wake percentage of 16.9%.  Wake after Sleep Onset was 80.5 minutes.    Respiratory Summary  The polysomnogram revealed a presence of - obstructive, - central, and - mixed apneas resulting in Total Apnea index of - events per hour.  There were 29 hypopneas resulting in Total Hypopnea index of 5.4 events per hour.  The combined Apnea/Hypopnea index was 5.4 events per hour.  There were a total of - RERA events resulting in a Respiratory Disturbance Index (RDI) of 5.4 events per hour.     Mean oxygen saturation was 96.1%.  The lowest oxygen saturation during sleep was 91.0%.  Time spent ?88% oxygen saturation was - minutes (-).    End Tidal CO2 during sleep ranged " "from - to - mmHg. End Tidal CO2 was greater than 50 mmHg for - minutes and greater than 55 mmHg for - minutes.  Transcutaneous CO2 during sleep ranged from - to - mmHg. Transcutaneous CO2 was greater than 50 mmHg for - minutes and greater than 55 mmHg for - minutes.    Limb Movement Activity  There were 150 limb movements recorded.  Of this total, 150 were classified as PLMs.  Of the PLMs, 2 were associated with arousals.  The Limb Movement index was 27.7 per hour while the PLM index was 27.7 per hour and PLM with arousals index was 0.4 per hour.    Cardiac: single lead EKG revealed normal sinus rhythm     PAP titration:    Mask used in the study:  F&P Simplus large interface   PAP = 7 cwp was partially effective in lateral N2 sleep.  PAP = 9 cwp was largely effective in lateral N2 sleep.  PAP = 12 cwp was largely effective in lateral REM sleep.    Oxygenation:  At therapeutic levels of PAP therapy, there was no baseline hypoxemia    Impression:  -obstructive sleep apnea     Recommendations:    -initial auto-CPAP settings of CPAP min = 9 cwp  and CPAP max =  14 cwp are recommended  -if the patient has difficulty initiating sleep or has significant sleep disruption at the above pressures, CPAP min should be adjusted to 13cwp or higher depending on pressure tolerance  -the patient has follow up with Sleep Medicine        Jeremias Yip MD    (This Sleep Study was interpreted by a Board Certified Sleep Specialist who conducted an epoch-by-epoch review of the entire raw data recording.)  (The indication for this sleep study was reviewed and deemed appropriate by AASM Practice Parameters or other reasons by a Board Certified Sleep Specialist.)[facility]    Titration Report    Patient Name: LIBBY MARI Study Date: 11/28/2023   YOB: 1995 MRN #: [id number]   Age: 27 year TOMER #: [id number]   Sex: Male Referring Provider: ISSAC AGUILAR NP   Height: 6' 2" Recording Tech: Allison " Jesus RPSGT   Weight: 317.0 lbs Scoring Tech: Yunier Westbrook RRT RPSGT   BMI: 40.7 Interpreting Physician: -   ESS: - Neck Circumference: -     Study Overview    Lights Off: 09:44:34 PM  Count Index   Lights On: 04:41:41 AM Awakenings: 28 5.2   Time in Bed: 417.1 min. Arousals: 45 8.3   Total Sleep Time: 324.5 min. Apneas & Hypopneas: 29 5.4    Sleep Efficiency: 77.8% Limb Movements: 150 27.7   Sleep Latency: 12.1 min. Snores: - -   Wake After Sleep Onset: 80.5 min. Desaturations: 48 8.9    REM Latency from Sleep Onset: 187.0 min. Minimum SpO2 TST: 91.0%      Sleep Architecture   % of Time in Bed  Stages Time (mins) % Sleep Time   Wake 93.5    Stage N1 27.5 8.5%   Stage N2 180.0 55.5%   Stage N3 61.0 18.8%   REM 56.0 17.3%         Arousal Summary     NREM REM Sleep Index   Respiratory Arousals 2 - 2 0.4   PLM Arousals 2 - 2 0.4   Isolated Limb Movement Arousals - - - -   Spontaneous Arousals 38 3 41 7.6   Total 42 3 45 8.3       Limb Movement Summary     Count Index   Isolated Limb Movements - -   Periodic Limb Movements (PLMs) 150 27.7   Total Limb Movements 150 27.7   Respiratory Summary     By Sleep Stage By Body Position Total    NREM REM Supine Non-Supine    Time (min) 268.5 56.0 45.0 279.5 324.5           Obstructive Apnea - - - - -   Mixed Apnea - - - - -   Central Apnea - - - - -   Central Apnea Index - - - - -   Total Apneas - - - - -   Total Apnea Index - - - - -           Total Hypopnea 24 5 7 22 29   Total Hypopnea Index 5.4 5.4 9.3 4.7 5.4           Apnea & Hypopnea 24 5 7 22 29   Apnea & Hypopnea Index 5.4 5.4 9.3 4.7 5.4           RERAs - - - - -   RERA Index - - - - -           RDI 5.4 5.4 9.3 4.7 5.4     Scoring Criteria: Hypopneas scored at 3% desaturation criteria.    Respiratory Event Durations     Apnea Hypopnea    NREM REM NREM REM   Average (seconds) - - 15.7 14.5   Maximum (seconds) - - 25.7 16.9       Oxygen Saturation Summary     Wake NREM REM TST Total   Average SpO2 96.6% 95.9% 96.6% 96.0%  96.1%   Minimum SpO2 91.0% 91.0% 93.0% 91.0% 91.0%   Maximum SpO2 99.0% 99.0% 99.0% 99.0% 99.0%     Oxygen Saturation Distribution    Range (%) Time in range (min) Time in range (%)    90.0 - 100.0 413.4 100.0%   80.0 - 90.0 - -   70.0 - 80.0 - -   60.0 - 70.0 - -   50.0 - 60.0 - -   0.0 - 50.0 - -   Time Spent ?88% SpO2    Range (%) Time in range (min) Time in range (%)   0.0 - 88.0 - -          Count Index   Desaturations 48 8.9      Cardiac Summary     Wake NREM REM Sleep Total   Average Pulse Rate (BPM) 63.5 60.5 61.2 60.6 61.3   Minimum Pulse Rate (BPM) 49.0 49.0 50.0 49.0 49.0   Maximum Pulse Rate (BPM) 92.0 78.0 75.0 78.0 92.0     Pulse Rate Distribution    Range (bpm) Time in range (min) Time in range (%)   0.0 - 40.0 - -   40.0 - 60.0 185.8 44.9%   60.0 - 80.0 225.8 54.5%   80.0 - 100.0 2.4 0.6%   100.0 - 120.0 - -   120.0 - 140.0 - -   140.0 - 200.0 - -     EtCO2 Summary    Stage Min (mmHg) Average (mmHg) Max (mmHg)   Wake - - -   NREM(1+2+3) - - -   REM - - -     Range (mmHg) Time in range (min) Time in range (%)   20.0 - 40.0 - -   40.0 - 50.0 - -   50.0 - 55.0 - -   55.0 - 100.0 - -   Excluded data <20.0 & >100.0 418.0 100.0%     TcCO2 Summary    Stage Min (mmHg) Average (mmHg) Max (mmHg)   Wake - - -   NREM(1+2+3) - - -   REM - - -     Range (mmHg) Time in range (min) Time in range (%)   20.0 - 40.0 - -   40.0 - 50.0 - -   50.0 - 55.0 - -   55.0 - 100.0 - -   Excluded data <20.0 & >100.0 418.0 100.0%     Comments    -    Titration Summary    PAP Device PAP Level O2 Level Time (min) TST (min) NREM (min) REM (min) Wake (min) Sleep Eff% OA# CA# MA# Hyp# AHI RERA RDI Min SpO2 SpO2 ?88% (min) Ar. Index   CPAP 7 - 67.5 42.5 42.5 0.0 25.0 63.0% - - - 8 11.3 - 11.3 91.0  0.0 12.7   CPAP 9 - 141.0 104.5 95.5 9.0 36.5 74.1% - - - 11 6.3 - 6.3 91.0  0.0 10.9   CPAP 11 - 101.0 98.0 80.5 17.5 3.0 97.0% - - - 3 1.8 - 1.8 91.0  0.0 5.5   CPAP 12 - 108.5 79.5 50.0 29.5 29.0 73.3% - - - 7 5.3 - 5.3 93.0  0.0 6.0

## 2023-12-11 ENCOUNTER — PATIENT MESSAGE (OUTPATIENT)
Dept: SLEEP MEDICINE | Facility: CLINIC | Age: 28
End: 2023-12-11
Payer: COMMERCIAL

## 2023-12-13 ENCOUNTER — LAB VISIT (OUTPATIENT)
Dept: LAB | Facility: HOSPITAL | Age: 28
End: 2023-12-13
Attending: FAMILY MEDICINE
Payer: COMMERCIAL

## 2023-12-13 DIAGNOSIS — F41.9 ANXIETY DISORDER, UNSPECIFIED TYPE: ICD-10-CM

## 2023-12-13 LAB
ALBUMIN SERPL BCP-MCNC: 4.4 G/DL (ref 3.5–5.2)
ALP SERPL-CCNC: 61 U/L (ref 55–135)
ALT SERPL W/O P-5'-P-CCNC: 51 U/L (ref 10–44)
ANION GAP SERPL CALC-SCNC: 8 MMOL/L (ref 8–16)
AST SERPL-CCNC: 27 U/L (ref 10–40)
BILIRUB SERPL-MCNC: 1.3 MG/DL (ref 0.1–1)
BUN SERPL-MCNC: 18 MG/DL (ref 6–20)
CALCIUM SERPL-MCNC: 9.1 MG/DL (ref 8.7–10.5)
CHLORIDE SERPL-SCNC: 106 MMOL/L (ref 95–110)
CO2 SERPL-SCNC: 28 MMOL/L (ref 23–29)
CREAT SERPL-MCNC: 0.9 MG/DL (ref 0.5–1.4)
EST. GFR  (NO RACE VARIABLE): >60 ML/MIN/1.73 M^2
GLUCOSE SERPL-MCNC: 92 MG/DL (ref 70–110)
POTASSIUM SERPL-SCNC: 4.4 MMOL/L (ref 3.5–5.1)
PROT SERPL-MCNC: 7.3 G/DL (ref 6–8.4)
SODIUM SERPL-SCNC: 142 MMOL/L (ref 136–145)
TESTOST SERPL-MCNC: 333 NG/DL (ref 304–1227)

## 2023-12-13 PROCEDURE — 80053 COMPREHEN METABOLIC PANEL: CPT | Performed by: FAMILY MEDICINE

## 2023-12-13 PROCEDURE — 84403 ASSAY OF TOTAL TESTOSTERONE: CPT | Performed by: FAMILY MEDICINE

## 2023-12-13 PROCEDURE — 36415 COLL VENOUS BLD VENIPUNCTURE: CPT | Mod: PO | Performed by: FAMILY MEDICINE

## 2023-12-18 ENCOUNTER — HOSPITAL ENCOUNTER (OUTPATIENT)
Dept: RADIOLOGY | Facility: HOSPITAL | Age: 28
Discharge: HOME | End: 2023-12-18
Attending: PHYSICIAN ASSISTANT
Payer: COMMERCIAL

## 2023-12-18 ENCOUNTER — OFFICE VISIT (OUTPATIENT)
Dept: SPORTS MEDICINE | Facility: CLINIC | Age: 28
End: 2023-12-18
Payer: COMMERCIAL

## 2023-12-18 VITALS
WEIGHT: 315 LBS | BODY MASS INDEX: 40.48 KG/M2 | DIASTOLIC BLOOD PRESSURE: 79 MMHG | SYSTOLIC BLOOD PRESSURE: 131 MMHG | HEART RATE: 85 BPM

## 2023-12-18 DIAGNOSIS — T14.8XXA MUSCLE STRAIN: ICD-10-CM

## 2023-12-18 DIAGNOSIS — M25.512 LEFT SHOULDER PAIN, UNSPECIFIED CHRONICITY: ICD-10-CM

## 2023-12-18 DIAGNOSIS — M25.512 ACUTE PAIN OF LEFT SHOULDER: Primary | ICD-10-CM

## 2023-12-18 PROCEDURE — 99204 PR OFFICE/OUTPT VISIT, NEW, LEVL IV, 45-59 MIN: ICD-10-PCS | Mod: S$GLB,,, | Performed by: PHYSICIAN ASSISTANT

## 2023-12-18 PROCEDURE — 99999 PR PBB SHADOW E&M-EST. PATIENT-LVL III: CPT | Mod: PBBFAC,,, | Performed by: PHYSICIAN ASSISTANT

## 2023-12-18 PROCEDURE — 3078F PR MOST RECENT DIASTOLIC BLOOD PRESSURE < 80 MM HG: ICD-10-PCS | Mod: CPTII,S$GLB,, | Performed by: PHYSICIAN ASSISTANT

## 2023-12-18 PROCEDURE — 1159F MED LIST DOCD IN RCRD: CPT | Mod: CPTII,S$GLB,, | Performed by: PHYSICIAN ASSISTANT

## 2023-12-18 PROCEDURE — 1160F PR REVIEW ALL MEDS BY PRESCRIBER/CLIN PHARMACIST DOCUMENTED: ICD-10-PCS | Mod: CPTII,S$GLB,, | Performed by: PHYSICIAN ASSISTANT

## 2023-12-18 PROCEDURE — 3008F PR BODY MASS INDEX (BMI) DOCUMENTED: ICD-10-PCS | Mod: CPTII,S$GLB,, | Performed by: PHYSICIAN ASSISTANT

## 2023-12-18 PROCEDURE — 3075F SYST BP GE 130 - 139MM HG: CPT | Mod: CPTII,S$GLB,, | Performed by: PHYSICIAN ASSISTANT

## 2023-12-18 PROCEDURE — 99204 OFFICE O/P NEW MOD 45 MIN: CPT | Mod: S$GLB,,, | Performed by: PHYSICIAN ASSISTANT

## 2023-12-18 PROCEDURE — 73030 X-RAY EXAM OF SHOULDER: CPT | Mod: TC,LT

## 2023-12-18 PROCEDURE — 1160F RVW MEDS BY RX/DR IN RCRD: CPT | Mod: CPTII,S$GLB,, | Performed by: PHYSICIAN ASSISTANT

## 2023-12-18 PROCEDURE — 73030 X-RAY EXAM OF SHOULDER: CPT | Mod: 26,LT,, | Performed by: RADIOLOGY

## 2023-12-18 PROCEDURE — 99999 PR PBB SHADOW E&M-EST. PATIENT-LVL III: ICD-10-PCS | Mod: PBBFAC,,, | Performed by: PHYSICIAN ASSISTANT

## 2023-12-18 PROCEDURE — 3075F PR MOST RECENT SYSTOLIC BLOOD PRESS GE 130-139MM HG: ICD-10-PCS | Mod: CPTII,S$GLB,, | Performed by: PHYSICIAN ASSISTANT

## 2023-12-18 PROCEDURE — 3078F DIAST BP <80 MM HG: CPT | Mod: CPTII,S$GLB,, | Performed by: PHYSICIAN ASSISTANT

## 2023-12-18 PROCEDURE — 1159F PR MEDICATION LIST DOCUMENTED IN MEDICAL RECORD: ICD-10-PCS | Mod: CPTII,S$GLB,, | Performed by: PHYSICIAN ASSISTANT

## 2023-12-18 PROCEDURE — 3008F BODY MASS INDEX DOCD: CPT | Mod: CPTII,S$GLB,, | Performed by: PHYSICIAN ASSISTANT

## 2023-12-18 PROCEDURE — 73030 XR SHOULDER COMPLETE 2 OR MORE VIEWS LEFT: ICD-10-PCS | Mod: 26,LT,, | Performed by: RADIOLOGY

## 2023-12-18 NOTE — LETTER
Patient: James Jeff   YOB: 1995   Clinic Number: [id number]   Today's Date: December 18, 2023        Certificate to Return to Work     James Bowser was seen by Ryan Salas PA-C on 12/18/2023.    James Bowser can return to work on 12/19/2023 with limited duty until 12/27/2023.    Please Excuse Terrell from missing work on 12/16/2023-12/18/2023.    If you have any questions or concerns, please feel free to contact the office at [phone].    Thank you.    Ryan Salas PA-C        Signature:

## 2023-12-19 NOTE — PROGRESS NOTES
CC: Left shoulder pain     28 y.o. RHD Male presents as a new patient to me. He  works at  [employer]. Complaint is left shoulder pain x Friday. Atraumatic onset. 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 He took off work Saturday and Sunday to rest and says the pain has improved with ice/heat/ibuprofen. Denies neck pain or radicular symptoms. He has improved and has minimal pain on exam today. Treatment thus far has included activity modifications, rest, and oral medication.  Here today to discuss diagnosis and treatment options.     Negative for tobacco.   Negative for diabetes.    Pain Score:   3    PAST MEDICAL HISTORY:   Past Medical History:   Diagnosis Date    GERD (gastroesophageal reflux disease)     Psoriasis        PAST SURGICAL HISTORY:  Past Surgical History:   Procedure Laterality Date    HAND SURGERY  2009       FAMILY HISTORY:  Family History   Problem Relation Age of Onset    Miscarriages / Stillbirths Mother     Hypertension Mother     Valvular heart disease Mother         on Coumadin    Stroke Mother     Diabetes Mother     Obesity Mother     Alcohol abuse Father     Alcohol abuse Brother        MEDICATIONS:    Current Outpatient Medications:     clindamycin (CLEOCIN T) 1 % lotion, Apply topically 2 (two) times daily., Disp: 60 mL, Rfl: 1    clobetasoL (TEMOVATE) 0.05 % cream, Apply topically 2 (two) times daily. Prn foot rash flare.Stop using steroid topical when skin is smooth and non itchy.  Do not treat dark or red coloring., Disp: 60 g, Rfl: 1    propranoloL (INDERAL) 20 MG tablet, Take 1 tablet (20 mg total) by mouth 2 (two) times daily as needed (anxiety; pulse greater than 90)., Disp: 60 tablet, Rfl: 1    traZODone (DESYREL) 50 MG tablet, Take 1 tablet (50 mg total) by mouth nightly as needed for Insomnia., Disp: 14 tablet, Rfl: 0    venlafaxine (EFFEXOR-XR) 37.5 MG 24 hr capsule, Take 1 capsule (37.5 mg  total) by mouth once daily., Disp: 30 capsule, Rfl: 1    ciprofloxacin-dexAMETHasone 0.3-0.1% (CIPRODEX) 0.3-0.1 % DrpS, Place 4 drops into the left ear 2 (two) times daily. (Patient not taking: Reported on 10/9/2023), Disp: 7.5 mL, Rfl: 0    terbinafine HCL (LAMISIL AT) 1 % cream, Apply topically 2 (two) times daily. Apply to rash on foot. (Patient not taking: Reported on 12/18/2023), Disp: 28 g, Rfl: 1    ALLERGIES:  Review of patient's allergies indicates:  No Known Allergies     REVIEW OF SYSTEMS:  Constitution: Negative. Negative for chills, fever and night sweats.    Hematologic/Lymphatic: Negative for bleeding problem. Does not bruise/bleed easily.   Skin: Negative for dry skin, itching and rash.   Musculoskeletal: Negative for falls. Positive for left shoulder pain and muscle weakness.     All other review of symptoms were reviewed and found to be noncontributory.    PHYSICAL EXAMINATION:  Vitals:  /79   Pulse 85   Wt (!) 143 kg (315 lb 4.1 oz)   BMI 40.48 kg/m²    General: Well-developed well-nourished 28 y.o. malein no acute distress   Cardiovascular: Regular rhythm by palpation of distal pulse, normal color and temperature, no concerning varicosities on symptomatic side   Lungs: No labored breathing or wheezing appreciated   Neuro: Alert and oriented ×3   Psychiatric: well oriented to person, place and time, demonstrates normal mood and affect   Skin: No rashes, lesions or ulcers, normal temperature, turgor, and texture on uninvolved extremity    Ortho/SPM Exam  Examination of the left shoulder demonstrates active forward elevation to 170, ER with arm at side to 90 IR to T12. Passive FE to 170, ER to 90. No tenderness along the proximal biceps tendon. Negative AC tenderness. 5/5 resisted supraspinatus testing. 5/5 resisted infraspinatus testing. Negative belly press test. Stable shoulder. No midline neck tenderness. Negative Spurling's maneuver.     IMAGING:  Xrays including AP, Outlet and  Axillary Lateral of Left shoulder are ordered / images reviewed by me:   No fracture or acute change appreciated. No significant glenohumeral degenerative changes. Normal acromiohumeral distance.     ASSESSMENT:      ICD-10-CM ICD-9-CM   1. Left shoulder pain, unspecified chronicity  M25.512 719.41       PLAN:     -Findings and treatment options were discussed with the patient. Patient with likely muscular strain that has improved with conservative treatment. He has minimal to no pain on exam today. Continue conservative treatment.  -We have discussed a variety of treatment options including medications, injections, physical therapy and other alternative treatments. He has improved with conservative treatment and we will continue to do so.    I made the decision to obtain old records of the patient including previous notes and imaging. I independently reviewed and interpreted lab results today as well as prior imaging.     1. OTC NSAIDs as needed.  2. Ice compress to the affected area 2-3x a day for 15-20 minutes as needed for pain management. Alternate with heat therapy.  3. Continue rest/light duty at work until his pain resolves. Work note written.  4. RTC prn    All of the patient's questions were answered and the patient will contact us if they have any questions or concerns in the interim.

## 2024-01-07 ENCOUNTER — PATIENT MESSAGE (OUTPATIENT)
Dept: SLEEP MEDICINE | Facility: CLINIC | Age: 29
End: 2024-01-07
Payer: COMMERCIAL

## 2024-01-18 ENCOUNTER — PATIENT MESSAGE (OUTPATIENT)
Dept: SLEEP MEDICINE | Facility: CLINIC | Age: 29
End: 2024-01-18
Payer: COMMERCIAL

## 2024-01-20 ENCOUNTER — HOSPITAL ENCOUNTER (EMERGENCY)
Facility: HOSPITAL | Age: 29
Discharge: HOME | End: 2024-01-20
Attending: STUDENT IN AN ORGANIZED HEALTH CARE EDUCATION/TRAINING PROGRAM
Payer: COMMERCIAL

## 2024-01-20 VITALS
DIASTOLIC BLOOD PRESSURE: 99 MMHG | TEMPERATURE: 98 F | WEIGHT: 315 LBS | BODY MASS INDEX: 40.44 KG/M2 | HEART RATE: 86 BPM | SYSTOLIC BLOOD PRESSURE: 152 MMHG | OXYGEN SATURATION: 98 % | RESPIRATION RATE: 19 BRPM

## 2024-01-20 DIAGNOSIS — R07.81 RIB PAIN: Primary | ICD-10-CM

## 2024-01-20 LAB
ALBUMIN SERPL BCP-MCNC: 4.6 G/DL (ref 3.5–5.2)
ALP SERPL-CCNC: 63 U/L (ref 55–135)
ALT SERPL W/O P-5'-P-CCNC: 61 U/L (ref 10–44)
ANION GAP SERPL CALC-SCNC: 8 MMOL/L (ref 8–16)
AST SERPL-CCNC: 30 U/L (ref 10–40)
BASOPHILS # BLD AUTO: 0.04 K/UL (ref 0–0.2)
BASOPHILS NFR BLD: 0.4 % (ref 0–1.9)
BILIRUB SERPL-MCNC: 0.7 MG/DL (ref 0.1–1)
BUN SERPL-MCNC: 19 MG/DL (ref 6–20)
CALCIUM SERPL-MCNC: 9.6 MG/DL (ref 8.7–10.5)
CHLORIDE SERPL-SCNC: 107 MMOL/L (ref 95–110)
CO2 SERPL-SCNC: 25 MMOL/L (ref 23–29)
CREAT SERPL-MCNC: 0.9 MG/DL (ref 0.5–1.4)
DIFFERENTIAL METHOD BLD: NORMAL
EOSINOPHIL # BLD AUTO: 0.2 K/UL (ref 0–0.5)
EOSINOPHIL NFR BLD: 2.1 % (ref 0–8)
ERYTHROCYTE [DISTWIDTH] IN BLOOD BY AUTOMATED COUNT: 12.4 % (ref 11.5–14.5)
EST. GFR  (NO RACE VARIABLE): >60 ML/MIN/1.73 M^2
GLUCOSE SERPL-MCNC: 92 MG/DL (ref 70–110)
HCT VFR BLD AUTO: 43.2 % (ref 40–54)
HGB BLD-MCNC: 14.7 G/DL (ref 14–18)
IMM GRANULOCYTES # BLD AUTO: 0.02 K/UL (ref 0–0.04)
IMM GRANULOCYTES NFR BLD AUTO: 0.2 % (ref 0–0.5)
LYMPHOCYTES # BLD AUTO: 3.6 K/UL (ref 1–4.8)
LYMPHOCYTES NFR BLD: 35.1 % (ref 18–48)
MCH RBC QN AUTO: 28.7 PG (ref 27–31)
MCHC RBC AUTO-ENTMCNC: 34 G/DL (ref 32–36)
MCV RBC AUTO: 84 FL (ref 82–98)
MONOCYTES # BLD AUTO: 0.7 K/UL (ref 0.3–1)
MONOCYTES NFR BLD: 7.1 % (ref 4–15)
NEUTROPHILS # BLD AUTO: 5.6 K/UL (ref 1.8–7.7)
NEUTROPHILS NFR BLD: 55.1 % (ref 38–73)
NRBC BLD-RTO: 0 /100 WBC
PLATELET # BLD AUTO: 375 K/UL (ref 150–450)
PMV BLD AUTO: 9.5 FL (ref 9.2–12.9)
POTASSIUM SERPL-SCNC: 4.1 MMOL/L (ref 3.5–5.1)
PROT SERPL-MCNC: 7.8 G/DL (ref 6–8.4)
RBC # BLD AUTO: 5.12 M/UL (ref 4.6–6.2)
SODIUM SERPL-SCNC: 140 MMOL/L (ref 136–145)
TROPONIN I SERPL DL<=0.01 NG/ML-MCNC: 0.01 NG/ML (ref 0–0.03)
WBC # BLD AUTO: 10.22 K/UL (ref 3.9–12.7)

## 2024-01-20 PROCEDURE — 25000003 PHARM REV CODE 250

## 2024-01-20 PROCEDURE — 99285 EMERGENCY DEPT VISIT HI MDM: CPT | Mod: 25

## 2024-01-20 PROCEDURE — 93005 ELECTROCARDIOGRAM TRACING: CPT

## 2024-01-20 PROCEDURE — 84484 ASSAY OF TROPONIN QUANT: CPT

## 2024-01-20 PROCEDURE — 85025 COMPLETE CBC W/AUTO DIFF WBC: CPT

## 2024-01-20 PROCEDURE — 93010 ELECTROCARDIOGRAM REPORT: CPT | Mod: ,,, | Performed by: INTERNAL MEDICINE

## 2024-01-20 PROCEDURE — 80053 COMPREHEN METABOLIC PANEL: CPT

## 2024-01-20 RX ORDER — BENZONATATE 100 MG/1
100 CAPSULE ORAL 3 TIMES DAILY PRN
Qty: 20 CAPSULE | Refills: 0 | Status: SHIPPED | OUTPATIENT
Start: 2024-01-20 | End: 2024-01-30

## 2024-01-20 RX ORDER — BENZONATATE 100 MG/1
100 CAPSULE ORAL
Status: COMPLETED | OUTPATIENT
Start: 2024-01-20 | End: 2024-01-20

## 2024-01-20 RX ORDER — NAPROXEN 500 MG/1
500 TABLET ORAL
Status: COMPLETED | OUTPATIENT
Start: 2024-01-20 | End: 2024-01-20

## 2024-01-20 RX ORDER — NAPROXEN 500 MG/1
500 TABLET ORAL 2 TIMES DAILY PRN
Qty: 60 TABLET | Refills: 0 | Status: SHIPPED | OUTPATIENT
Start: 2024-01-20

## 2024-01-20 RX ADMIN — NAPROXEN 500 MG: 500 TABLET ORAL at 07:01

## 2024-01-20 RX ADMIN — BENZONATATE 100 MG: 100 CAPSULE ORAL at 07:01

## 2024-01-21 NOTE — ED TRIAGE NOTES
James Jeff, a 28 y.o. male presents to the ED w/ complaint of chest pain. Patient states he has been having left sided rib pain that worsens when coughing. Patient states he has had the cough since christmas. Patient states having a hx of GERD.    Triage note:  Chief Complaint   Patient presents with    Pleurisy     L sided rib pain, more so when coughing. Has had a cough since after Christmas, pain increased when coughing. Possibly has GERD     Review of patient's allergies indicates:  No Known Allergies  Past Medical History:   Diagnosis Date    GERD (gastroesophageal reflux disease)     Psoriasis

## 2024-01-21 NOTE — DISCHARGE INSTRUCTIONS
As discussed her EKG and troponin were normal I do not suspect your pain today is related to your heart.  It was likely this is muscular pain due to excessive coughing since December.  Your x-ray did not show signs of pneumonia I have prescribed anti-inflammatory for pain along with cough suppressants.

## 2024-01-21 NOTE — ED PROVIDER NOTES
Encounter Date: 2024       History     Chief Complaint   Patient presents with    Pleurisy     L sided rib pain, more so when coughing. Has had a cough since after , pain increased when coughing. Possibly has GERD     28-year-old male presenting to the emergency department with pleuritic left-sided chest pain over the last week.  Patient reports around  he experienced a viral syndrome from known sick contact his nephew.  States since he has been experiencing a nonproductive cough.  He began to notice a left sided rib discomfort with cough that is progressed in area of discomfort of his chest.  He states the pain is uncomfortable even at rest.  States although it was a minimal 2/10 in his still concerning.  He has not experiencing fever chills nausea vomiting or diarrhea.  Patient reports he was getting  in 2-1/2 weeks and wants to make sure that he was okay.      Review of patient's allergies indicates:  No Known Allergies  Past Medical History:   Diagnosis Date    GERD (gastroesophageal reflux disease)     Psoriasis      Past Surgical History:   Procedure Laterality Date    HAND SURGERY       Family History   Problem Relation Age of Onset    Miscarriages / Stillbirths Mother     Hypertension Mother     Valvular heart disease Mother         on Coumadin    Stroke Mother     Diabetes Mother     Obesity Mother     Alcohol abuse Father     Alcohol abuse Brother      Social History     Tobacco Use    Smoking status: Former     Current packs/day: 0.00     Types: Cigarettes, Vaping with nicotine     Start date: 2010     Quit date: 2022     Years since quittin.8     Passive exposure: Past    Smokeless tobacco: Never   Substance Use Topics    Alcohol use: Yes     Alcohol/week: 2.0 standard drinks of alcohol     Types: 1 Cans of beer, 1 Shots of liquor per week     Comment: rare    Drug use: Not Currently     Review of Systems  See HPI  Physical Exam     Initial Vitals [24  1710]   BP Pulse Resp Temp SpO2   (!) 152/99 86 18 98.4 °F (36.9 °C) 96 %      MAP       --         Physical Exam    Vitals reviewed.  Constitutional: He appears well-developed.   HENT:   Head: Normocephalic and atraumatic.   Eyes: Conjunctivae and EOM are normal.   Neck:   Normal range of motion.  Cardiovascular:  Normal rate.           Pulmonary/Chest: Breath sounds normal. No respiratory distress. He has no wheezes. He has no rales. He exhibits no tenderness.   No reproducible pain on palpation.   Abdominal: Abdomen is soft. He exhibits no distension. There is no abdominal tenderness. There is no rebound.   Musculoskeletal:         General: Normal range of motion.      Cervical back: Normal range of motion.     Neurological: He is alert and oriented to person, place, and time.   Skin: Skin is warm and dry.   Psychiatric: He has a normal mood and affect. Thought content normal.         ED Course   Procedures  Labs Reviewed   COMPREHENSIVE METABOLIC PANEL - Abnormal; Notable for the following components:       Result Value    ALT 61 (*)     All other components within normal limits   CBC W/ AUTO DIFFERENTIAL   TROPONIN I          Imaging Results              X-Ray Chest PA And Lateral (Final result)  Result time 01/20/24 19:30:24      Final result by Luis Maki MD (01/20/24 19:30:24)                   Impression:      1. Interstitial findings are accentuated by habitus and shallow inspiratory effort.  No large focal consolidation.      Electronically signed by: Luis Maki MD  Date:    01/20/2024  Time:    19:30               Narrative:    EXAMINATION:  XR CHEST PA AND LATERAL    CLINICAL HISTORY:  Cough, unspecified    TECHNIQUE:  PA and lateral views of the chest were performed.    COMPARISON:  None    FINDINGS:  The cardiomediastinal silhouette is not enlarged.  There is no pleural effusion.  The trachea is midline.  The lungs are symmetrically expanded bilaterally with coarse interstitial  attenuation, accentuated by shallow inspiratory effort and habitus..  No large focal consolidation seen.  There is no pneumothorax.  The osseous structures are unremarkable.  There are a few radiopaque structures projected over the right shoulder..                                       Medications   naproxen tablet 500 mg (500 mg Oral Given 1/20/24 1905)   benzonatate capsule 100 mg (100 mg Oral Given 1/20/24 1905)     Medical Decision Making  28-year-old male presents emergency department due to left-sided rib discomfort, has been experiencing chronic cough since December.  Differential diagnosis includes musculoskeletal pain due to chronic cough, pneumonia also considered PE but less likely as patient was negative.  Normal sinus 78 beats per minute, no arrhythmia.  No ST depressions or elevations.  CXR negative for acute findings.  I believe patient's presentation is secondary to ongoing cough.  We will discharge with anti-inflammatories and cough suppressants.  Discharged home  Pt discussed with supervising physician      Amount and/or Complexity of Data Reviewed  Labs: ordered.  Radiology: ordered.    Risk  Prescription drug management.      Additional MDM:   PERC Rule:   Age is greater than or equal to 50 = 0.0  Heart Rate is greater than or equal to 100 = 0.0  SaO2 on room air < 95% = 0.0  Unilateral leg swelling = 0.0  Hemoptysis = 0.0  Recent surgery or trauma = 0.0  Prior PE or DVT =  0.0  Hormone use = 0.00  PERC Score = 0                                     Clinical Impression:  Final diagnoses:  [R07.81] Rib pain (Primary)                 Corrie Berry PA-C  01/20/24 2005     (1) More than 48 hours/None

## 2024-02-25 ENCOUNTER — OFFICE VISIT (OUTPATIENT)
Dept: URGENT CARE | Facility: CLINIC | Age: 29
End: 2024-02-25
Payer: COMMERCIAL

## 2024-02-25 VITALS
HEART RATE: 96 BPM | WEIGHT: 315 LBS | OXYGEN SATURATION: 95 % | SYSTOLIC BLOOD PRESSURE: 172 MMHG | DIASTOLIC BLOOD PRESSURE: 101 MMHG | RESPIRATION RATE: 18 BRPM | BODY MASS INDEX: 40.43 KG/M2 | TEMPERATURE: 99 F | HEIGHT: 74 IN

## 2024-02-25 DIAGNOSIS — J02.9 SORE THROAT: Primary | ICD-10-CM

## 2024-02-25 LAB
CTP QC/QA: YES
MOLECULAR STREP A: NEGATIVE

## 2024-02-25 PROCEDURE — 87651 STREP A DNA AMP PROBE: CPT | Mod: QW,S$GLB,,

## 2024-02-25 PROCEDURE — 99213 OFFICE O/P EST LOW 20 MIN: CPT | Mod: S$GLB,,,

## 2024-02-25 NOTE — PROGRESS NOTES
"Subjective:      Patient ID: James Jeff is a 28 y.o. male.    Vitals:  height is 6' 2" (1.88 m) and weight is 142.9 kg (315 lb) (abnormal). His oral temperature is 98.8 °F (37.1 °C). His blood pressure is 172/101 (abnormal) and his pulse is 96. His respiration is 18 and oxygen saturation is 95%.     Chief Complaint: Sore Throat    This is a 28 y.o. male who presents today with a chief complaint of strep throat exposure, would like to be tested for strep. Exposed to strep, wife is positive.  No symptoms at this time.    Sore Throat   This is a new problem. The current episode started yesterday. There has been no fever. Pertinent negatives include no abdominal pain, coughing, ear pain, headaches, neck pain or shortness of breath. He has had exposure to strep. He has tried nothing for the symptoms.     Constitution: Negative for fever and generalized weakness.   HENT:  Negative for ear pain, sinus pain and sore throat.    Neck: Negative for neck pain.   Cardiovascular:  Negative for chest pain.   Respiratory:  Negative for cough and shortness of breath.    Gastrointestinal:  Negative for abdominal pain.   Neurological:  Negative for headaches.      Objective:     Physical Exam   Constitutional: He is oriented to person, place, and time. He appears well-developed. He is cooperative.  Non-toxic appearance. He does not appear ill. No distress.   HENT:   Head: Normocephalic and atraumatic.   Ears:   Right Ear: Hearing, tympanic membrane, external ear and ear canal normal.   Left Ear: Hearing, tympanic membrane, external ear and ear canal normal.   Nose: Nose normal. No mucosal edema, rhinorrhea or nasal deformity. No epistaxis. Right sinus exhibits no maxillary sinus tenderness and no frontal sinus tenderness. Left sinus exhibits no maxillary sinus tenderness and no frontal sinus tenderness.   Mouth/Throat: Uvula is midline, oropharynx is clear and moist and mucous membranes are normal. No trismus in the jaw. " Normal dentition. No uvula swelling. No oropharyngeal exudate, posterior oropharyngeal edema or posterior oropharyngeal erythema.   Eyes: Conjunctivae and lids are normal. No scleral icterus.   Neck: Trachea normal and phonation normal. Neck supple. No edema present. No erythema present. No neck rigidity present.   Cardiovascular: Normal rate, regular rhythm, normal heart sounds and normal pulses.   Pulmonary/Chest: Effort normal and breath sounds normal. No respiratory distress. He has no decreased breath sounds. He has no rhonchi.   Abdominal: Normal appearance.   Musculoskeletal: Normal range of motion.         General: No deformity. Normal range of motion.   Neurological: He is alert and oriented to person, place, and time. He exhibits normal muscle tone. Coordination normal.   Skin: Skin is warm, dry, intact, not diaphoretic and not pale.   Psychiatric: His speech is normal and behavior is normal. Judgment and thought content normal.   Nursing note and vitals reviewed.      Assessment:     1. Sore throat        Plan:     Patient is negative for strep in clinic today.  Asymptomatic.  Return precautions given.     Results for orders placed or performed in visit on 02/25/24   POCT Strep A, Molecular   Result Value Ref Range    Molecular Strep A, POC Negative Negative     Acceptable Yes          Sore throat  -     POCT Strep A, Molecular

## 2024-04-24 ENCOUNTER — PATIENT MESSAGE (OUTPATIENT)
Dept: FAMILY MEDICINE | Facility: CLINIC | Age: 29
End: 2024-04-24
Payer: COMMERCIAL

## 2024-04-24 DIAGNOSIS — N52.9 ERECTILE DYSFUNCTION, UNSPECIFIED ERECTILE DYSFUNCTION TYPE: Primary | ICD-10-CM

## 2024-04-25 VITALS — DIASTOLIC BLOOD PRESSURE: 85 MMHG | SYSTOLIC BLOOD PRESSURE: 130 MMHG

## 2024-04-25 NOTE — TELEPHONE ENCOUNTER
Please get patient scheduled for labs ordered today.  He needs to have them done before 10AM on whichever day works for him    Thanks  Dr. Kenney

## 2024-04-29 ENCOUNTER — LAB VISIT (OUTPATIENT)
Dept: LAB | Facility: HOSPITAL | Age: 29
End: 2024-04-29
Attending: FAMILY MEDICINE
Payer: COMMERCIAL

## 2024-04-29 DIAGNOSIS — N52.9 ERECTILE DYSFUNCTION, UNSPECIFIED ERECTILE DYSFUNCTION TYPE: ICD-10-CM

## 2024-04-29 LAB
ALBUMIN SERPL BCP-MCNC: 4.3 G/DL (ref 3.5–5.2)
ALP SERPL-CCNC: 62 U/L (ref 55–135)
ALT SERPL W/O P-5'-P-CCNC: 77 U/L (ref 10–44)
ANION GAP SERPL CALC-SCNC: 9 MMOL/L (ref 8–16)
AST SERPL-CCNC: 35 U/L (ref 10–40)
BASOPHILS # BLD AUTO: 0.06 K/UL (ref 0–0.2)
BASOPHILS NFR BLD: 0.6 % (ref 0–1.9)
BILIRUB SERPL-MCNC: 0.8 MG/DL (ref 0.1–1)
BUN SERPL-MCNC: 17 MG/DL (ref 6–20)
CALCIUM SERPL-MCNC: 9.5 MG/DL (ref 8.7–10.5)
CHLORIDE SERPL-SCNC: 105 MMOL/L (ref 95–110)
CO2 SERPL-SCNC: 25 MMOL/L (ref 23–29)
CREAT SERPL-MCNC: 0.9 MG/DL (ref 0.5–1.4)
DIFFERENTIAL METHOD BLD: ABNORMAL
EOSINOPHIL # BLD AUTO: 0.4 K/UL (ref 0–0.5)
EOSINOPHIL NFR BLD: 4.4 % (ref 0–8)
ERYTHROCYTE [DISTWIDTH] IN BLOOD BY AUTOMATED COUNT: 12.5 % (ref 11.5–14.5)
EST. GFR  (NO RACE VARIABLE): >60 ML/MIN/1.73 M^2
GLUCOSE SERPL-MCNC: 100 MG/DL (ref 70–110)
HCT VFR BLD AUTO: 44.9 % (ref 40–54)
HGB BLD-MCNC: 15 G/DL (ref 14–18)
IMM GRANULOCYTES # BLD AUTO: 0.05 K/UL (ref 0–0.04)
IMM GRANULOCYTES NFR BLD AUTO: 0.5 % (ref 0–0.5)
LYMPHOCYTES # BLD AUTO: 3.8 K/UL (ref 1–4.8)
LYMPHOCYTES NFR BLD: 38.8 % (ref 18–48)
MCH RBC QN AUTO: 28.1 PG (ref 27–31)
MCHC RBC AUTO-ENTMCNC: 33.4 G/DL (ref 32–36)
MCV RBC AUTO: 84 FL (ref 82–98)
MONOCYTES # BLD AUTO: 1.1 K/UL (ref 0.3–1)
MONOCYTES NFR BLD: 11.1 % (ref 4–15)
NEUTROPHILS # BLD AUTO: 4.4 K/UL (ref 1.8–7.7)
NEUTROPHILS NFR BLD: 44.6 % (ref 38–73)
NRBC BLD-RTO: 0 /100 WBC
PLATELET # BLD AUTO: 370 K/UL (ref 150–450)
PMV BLD AUTO: 9.8 FL (ref 9.2–12.9)
POTASSIUM SERPL-SCNC: 4.5 MMOL/L (ref 3.5–5.1)
PROT SERPL-MCNC: 7.5 G/DL (ref 6–8.4)
RBC # BLD AUTO: 5.33 M/UL (ref 4.6–6.2)
SODIUM SERPL-SCNC: 139 MMOL/L (ref 136–145)
TESTOST SERPL-MCNC: 298 NG/DL (ref 304–1227)
WBC # BLD AUTO: 9.75 K/UL (ref 3.9–12.7)

## 2024-04-29 PROCEDURE — 84403 ASSAY OF TOTAL TESTOSTERONE: CPT | Performed by: FAMILY MEDICINE

## 2024-04-29 PROCEDURE — 80053 COMPREHEN METABOLIC PANEL: CPT | Performed by: FAMILY MEDICINE

## 2024-04-29 PROCEDURE — 85025 COMPLETE CBC W/AUTO DIFF WBC: CPT | Performed by: FAMILY MEDICINE

## 2024-04-29 PROCEDURE — 36415 COLL VENOUS BLD VENIPUNCTURE: CPT | Mod: PO | Performed by: FAMILY MEDICINE

## 2024-04-29 PROCEDURE — 84402 ASSAY OF FREE TESTOSTERONE: CPT | Performed by: FAMILY MEDICINE

## 2024-05-02 LAB — TESTOST FREE SERPL-MCNC: 8.5 PG/ML (ref 5.1–41.5)

## 2024-05-13 ENCOUNTER — PATIENT MESSAGE (OUTPATIENT)
Dept: FAMILY MEDICINE | Facility: CLINIC | Age: 29
End: 2024-05-13
Payer: COMMERCIAL

## 2024-05-15 ENCOUNTER — OFFICE VISIT (OUTPATIENT)
Dept: FAMILY MEDICINE | Facility: CLINIC | Age: 29
End: 2024-05-15
Payer: COMMERCIAL

## 2024-05-15 VITALS
SYSTOLIC BLOOD PRESSURE: 134 MMHG | RESPIRATION RATE: 18 BRPM | HEIGHT: 74 IN | TEMPERATURE: 98 F | DIASTOLIC BLOOD PRESSURE: 68 MMHG | OXYGEN SATURATION: 96 % | BODY MASS INDEX: 40.43 KG/M2 | WEIGHT: 315 LBS | HEART RATE: 83 BPM

## 2024-05-15 DIAGNOSIS — N52.9 ERECTILE DYSFUNCTION, UNSPECIFIED ERECTILE DYSFUNCTION TYPE: ICD-10-CM

## 2024-05-15 DIAGNOSIS — E66.01 MORBID OBESITY WITH BMI OF 40.0-44.9, ADULT: ICD-10-CM

## 2024-05-15 DIAGNOSIS — R20.2 PARESTHESIA OF HAND, BILATERAL: Primary | ICD-10-CM

## 2024-05-15 PROBLEM — E66.812 CLASS 2 OBESITY DUE TO EXCESS CALORIES WITHOUT SERIOUS COMORBIDITY WITH BODY MASS INDEX (BMI) OF 39.0 TO 39.9 IN ADULT: Status: RESOLVED | Noted: 2023-04-27 | Resolved: 2024-05-15

## 2024-05-15 PROBLEM — E66.09 CLASS 2 OBESITY DUE TO EXCESS CALORIES WITHOUT SERIOUS COMORBIDITY WITH BODY MASS INDEX (BMI) OF 39.0 TO 39.9 IN ADULT: Status: RESOLVED | Noted: 2023-04-27 | Resolved: 2024-05-15

## 2024-05-15 PROBLEM — E66.813 CLASS 3 OBESITY: Status: ACTIVE | Noted: 2024-05-15

## 2024-05-15 PROCEDURE — 99214 OFFICE O/P EST MOD 30 MIN: CPT | Mod: S$GLB,,, | Performed by: FAMILY MEDICINE

## 2024-05-15 PROCEDURE — 1159F MED LIST DOCD IN RCRD: CPT | Mod: CPTII,S$GLB,, | Performed by: FAMILY MEDICINE

## 2024-05-15 PROCEDURE — 3008F BODY MASS INDEX DOCD: CPT | Mod: CPTII,S$GLB,, | Performed by: FAMILY MEDICINE

## 2024-05-15 PROCEDURE — 3078F DIAST BP <80 MM HG: CPT | Mod: CPTII,S$GLB,, | Performed by: FAMILY MEDICINE

## 2024-05-15 PROCEDURE — 99999 PR PBB SHADOW E&M-EST. PATIENT-LVL V: CPT | Mod: PBBFAC,,, | Performed by: FAMILY MEDICINE

## 2024-05-15 PROCEDURE — 3075F SYST BP GE 130 - 139MM HG: CPT | Mod: CPTII,S$GLB,, | Performed by: FAMILY MEDICINE

## 2024-05-15 RX ORDER — ACETAMINOPHEN AND CODEINE PHOSPHATE 300; 30 MG/1; MG/1
1 TABLET ORAL EVERY 6 HOURS PRN
COMMUNITY
Start: 2023-12-20

## 2024-05-15 RX ORDER — TADALAFIL 20 MG/1
20 TABLET ORAL DAILY PRN
Qty: 30 TABLET | Refills: 2 | Status: SHIPPED | OUTPATIENT
Start: 2024-05-15 | End: 2025-05-15

## 2024-05-15 RX ORDER — IBUPROFEN 800 MG/1
800 TABLET ORAL EVERY 8 HOURS PRN
COMMUNITY
Start: 2023-12-20

## 2024-05-15 NOTE — PROGRESS NOTES
Routine Office Visit    Patient Name: James Jeff    : 1995  MRN: [id number]    Subjective:  James is a 28 y.o. male who presents today for:    1. Numbness and tingling in hands  Patient presenting today for numbness and tingling in both hands.  He states that these sensations come and go.  They don't happen over the entire hand.  No associated weakness.  He states it's mostly the thumb, 1st, 2nd, and 3rd fingers.  He was concerned something serious was going on and wanted to get checked out.     2.  ED  Patient would like to get a medication to help him have more longevity during sex.  He states he is about 50/50 as far as losing his erection during sex.  He had testosterone levels checked and they were low.  He is overweight and trying to change things, so that he is more healthy.  He states overall he feels good, but does get tired easily.      Past Medical History  Past Medical History:   Diagnosis Date    GERD (gastroesophageal reflux disease)     Psoriasis        Past Surgical History  Past Surgical History:   Procedure Laterality Date    HAND SURGERY         Family History  Family History   Problem Relation Name Age of Onset    Miscarriages / Stillbirths Mother      Hypertension Mother      Valvular heart disease Mother          on Coumadin    Stroke Mother      Diabetes Mother      Obesity Mother      Alcohol abuse Father      Alcohol abuse Brother         Social History  Social History     Socioeconomic History    Marital status: Significant Other    Number of children: 0   Occupational History    Occupation: retail   Tobacco Use    Smoking status: Former     Current packs/day: 0.00     Types: Cigarettes, Vaping with nicotine     Start date: 2010     Quit date: 2022     Years since quittin.1     Passive exposure: Past    Smokeless tobacco: Never   Substance and Sexual Activity    Alcohol use: Yes     Alcohol/week: 2.0 standard drinks of alcohol     Types: 1 Cans of  beer, 1 Shots of liquor per week     Comment: rare    Drug use: Not Currently    Sexual activity: Not Currently     Partners: Female   Social History Narrative    ** Merged History Encounter **          Social Determinants of Health     Financial Resource Strain: Low Risk  (5/15/2024)    Overall Financial Resource Strain (CARDIA)     Difficulty of Paying Living Expenses: Not very hard   Food Insecurity: No Food Insecurity (5/15/2024)    Hunger Vital Sign     Worried About Running Out of Food in the Last Year: Never true     Ran Out of Food in the Last Year: Never true   Transportation Needs: No Transportation Needs (5/15/2024)    PRAPARE - Transportation     Lack of Transportation (Medical): No     Lack of Transportation (Non-Medical): No   Physical Activity: Unknown (5/15/2024)    Exercise Vital Sign     Days of Exercise per Week: 4 days   Stress: Stress Concern Present (5/15/2024)    [employer] - Occupational Stress Questionnaire     Feeling of Stress : To some extent   Housing Stability: Unknown (5/15/2024)    Housing Stability Vital Sign     Unable to Pay for Housing in the Last Year: No       Current Medications  Current Outpatient Medications on File Prior to Visit   Medication Sig Dispense Refill    acetaminophen-codeine 300-30mg (TYLENOL #3) 300-30 mg Tab Take 1 tablet by mouth every 6 (six) hours as needed.      clindamycin (CLEOCIN T) 1 % lotion Apply topically 2 (two) times daily. 60 mL 1    clobetasoL (TEMOVATE) 0.05 % cream Apply topically 2 (two) times daily. Prn foot rash flare.Stop using steroid topical when skin is smooth and non itchy.  Do not treat dark or red coloring. 60 g 1    ibuprofen (ADVIL,MOTRIN) 800 MG tablet Take 800 mg by mouth every 8 (eight) hours as needed.      naproxen (NAPROSYN) 500 MG tablet Take 1 tablet (500 mg total) by mouth 2 (two) times daily as needed (pain). 60 tablet 0    propranoloL (INDERAL) 20 MG tablet Take 1 tablet (20 mg total) by  "mouth 2 (two) times daily as needed (anxiety; pulse greater than 90). 60 tablet 1    [DISCONTINUED] traZODone (DESYREL) 50 MG tablet Take 1 tablet (50 mg total) by mouth nightly as needed for Insomnia. 14 tablet 0    [DISCONTINUED] venlafaxine (EFFEXOR-XR) 37.5 MG 24 hr capsule Take 1 capsule (37.5 mg total) by mouth once daily. 30 capsule 1     No current facility-administered medications on file prior to visit.       Allergies   Review of patient's allergies indicates:  No Known Allergies    Review of Systems (Pertinent positives)  Review of Systems   Constitutional:  Positive for malaise/fatigue.   HENT: Negative.     Eyes: Negative.    Respiratory: Negative.     Cardiovascular: Negative.    Gastrointestinal: Negative.    Genitourinary:         +ED   Skin: Negative.    Neurological:  Positive for tingling.         /68   Pulse 83   Temp 98.3 °F (36.8 °C) (Oral)   Resp 18   Ht 6' 2" (1.88 m)   Wt (!) 153.9 kg (339 lb 4.6 oz)   SpO2 96%   BMI 43.56 kg/m²     GENERAL APPEARANCE: in no apparent distress and well developed and well nourished  HEENT: PERRL, EOMI, Sclera clear, anicteric, Oropharynx clear, no lesions, Neck supple with midline trachea  NECK: normal, supple, no adenopathy, thyroid normal in size  RESPIRATORY: appears well, vitals normal, no respiratory distress, acyanotic, normal RR, chest clear, no wheezing, crepitations, rhonchi, normal symmetric air entry  HEART: regular rate and rhythm, S1, S2 normal, no murmur, click, rub or gallop.    ABDOMEN: abdomen is soft without tenderness, no masses, no hernias, no organomegaly, no rebound, no guarding. Suprapubic tenderness absent. No CVA tenderness.  SKIN: no rashes, no wounds, no other lesions  PSYCH: Alert, oriented x 3, thought content appropriate, speech normal, pleasant and cooperative, good eye contact, well groomed,    Assessment/Plan:  James Jeff is a 28 y.o. male who presents today for :    James"Terrell" was seen " today for annual exam and medication refill.    Diagnoses and all orders for this visit:    Paresthesia of hand, bilateral  -     Ambulatory referral/consult to Neurology; Future  - Likely carpal tunnel, but will refer to neurology for evaluation and testing  - He was told that weight loss could help, but wearing wrist splints could help as well    Erectile dysfunction, unspecified erectile dysfunction type  -     tadalafiL (CIALIS) 20 MG Tab; Take 1 tablet (20 mg total) by mouth daily as needed (take 1 hour  prior to sexual activty).  - Will try on cialis, but warned of risk of priapism  - He is to go the ED for any erection lasting longer than 4 hours  - He is to let the ED know that he is on this medication in the event of chest pain, shortness of breath, or weakness    Morbid obesity with BMI of 40.0-44.9, adult  - Encouraged use of DocuTAP portia  - He was encouraged to start weight training to help build muscle mass  - Weight loss and exercise can help naturally raise testosterone levels, so will see what kind of response he gets to testosterone levels with weight loss      Adam Kenney MD

## 2024-06-11 ENCOUNTER — ON-DEMAND VIRTUAL (OUTPATIENT)
Dept: URGENT CARE | Facility: CLINIC | Age: 29
End: 2024-06-11
Payer: COMMERCIAL

## 2024-06-11 ENCOUNTER — PATIENT MESSAGE (OUTPATIENT)
Dept: URGENT CARE | Facility: CLINIC | Age: 29
End: 2024-06-11

## 2024-06-11 DIAGNOSIS — J30.9 ALLERGIC RHINITIS, UNSPECIFIED SEASONALITY, UNSPECIFIED TRIGGER: Primary | ICD-10-CM

## 2024-06-11 PROCEDURE — 99213 OFFICE O/P EST LOW 20 MIN: CPT | Mod: 95,,, | Performed by: FAMILY MEDICINE

## 2024-06-11 RX ORDER — PREDNISONE 20 MG/1
20 TABLET ORAL DAILY
Qty: 3 TABLET | Refills: 0 | Status: SHIPPED | OUTPATIENT
Start: 2024-06-11 | End: 2024-06-14

## 2024-06-11 NOTE — LETTER
June 11, 2024    James Jeff  [address] [place] [address] [address] [place] [id number]             Virtual Visit - Urgent Care  Urgent Care  [address] [facility] [id number]   June 11, 2024     Patient: James Jeff   YOB: 1995   Date of Visit: 6/11/2024       To Whom it May Concern:    James Jeff was seen virtually on 6/11/2024. He may return to work on 6/12/2024 as long as symptoms are generally improved .    Please excuse him from any classes or work missed.    If you have any questions or concerns, please don't hesitate to call.    Sincerely,            Darya Vale MD

## 2024-06-11 NOTE — PATIENT INSTRUCTIONS
AS WE DISCUSSED, I WOULD ALSO LIKE YOU TO DO A HOME COVID TEST.  CALL BACK PLEASE IN THE EVENT IT IS POSITIVE.    TRY A NONSEDATING ANTIHISTAMINE SUCH AS ZYRTEC OR CLARITAN OR ALLEGRA OR XYZAL (OR GENERICS FOR THESE) DAILY AS NEEDED.     TRY USING FLONASE, 2 INHALATIONS EACH NOSTRIL 1-2 TIMES DAILY, AND ON A REGULAR BASIS DURING DIFFICULT TIMES TO ADDRESS YOUR ALLERGY SYMPTOMS.       Make sure that you follow up with your primary care doctor in the next 2-5 days if needed .  Return to or go to Urgent Care if signs or symptoms change and certainly if you have worsening symptoms go to the nearest emergency department for further evaluation.

## 2024-06-11 NOTE — PROGRESS NOTES
Subjective:      Patient ID: James Jeff is a 28 y.o. male.    Vitals:  vitals were not taken for this visit.     Chief Complaint: Sinus Problem      Visit Type: TELE AUDIOVISUAL    Present with the patient at the time of consultation: TELEMED PRESENT WITH PATIENT: None  At home    Past Medical History:   Diagnosis Date    GERD (gastroesophageal reflux disease)     Psoriasis      Past Surgical History:   Procedure Laterality Date    HAND SURGERY  2009     Review of patient's allergies indicates:  No Known Allergies  Current Outpatient Medications on File Prior to Visit   Medication Sig Dispense Refill    acetaminophen-codeine 300-30mg (TYLENOL #3) 300-30 mg Tab Take 1 tablet by mouth every 6 (six) hours as needed.      clindamycin (CLEOCIN T) 1 % lotion Apply topically 2 (two) times daily. 60 mL 1    clobetasoL (TEMOVATE) 0.05 % cream Apply topically 2 (two) times daily. Prn foot rash flare.Stop using steroid topical when skin is smooth and non itchy.  Do not treat dark or red coloring. 60 g 1    ibuprofen (ADVIL,MOTRIN) 800 MG tablet Take 800 mg by mouth every 8 (eight) hours as needed.      naproxen (NAPROSYN) 500 MG tablet Take 1 tablet (500 mg total) by mouth 2 (two) times daily as needed (pain). 60 tablet 0    propranoloL (INDERAL) 20 MG tablet Take 1 tablet (20 mg total) by mouth 2 (two) times daily as needed (anxiety; pulse greater than 90). 60 tablet 1    tadalafiL (CIALIS) 20 MG Tab Take 1 tablet (20 mg total) by mouth daily as needed (take 1 hour  prior to sexual activty). 30 tablet 2     No current facility-administered medications on file prior to visit.     Family History   Problem Relation Name Age of Onset    Miscarriages / Stillbirths Mother      Hypertension Mother      Valvular heart disease Mother          on Coumadin    Stroke Mother      Diabetes Mother      Obesity Mother      Alcohol abuse Father      Alcohol abuse Brother         Medications Ordered                [facility] #[id number] - [place] - [address] [facility] AT [facility] [address] [place] [id number] [facility] [address]    Telephone: [phone]   Fax: [phone]   Hours: Not open 24 hours                         E-Prescribed (1 of 1)              predniSONE (DELTASONE) 20 MG tablet    Sig: Take 1 tablet (20 mg total) by mouth once daily. for 3 days       Start: 6/11/24     Quantity: 3 tablet Refills: 0                           [facility] Peq Odvv Intake    6/11/2024  5:55 AM CDT - Filed by Patient   What is your current physical address in the event of a medical emergency?    Are you able to take your vital signs? No   Please attach any relevant images or files          28-year-old male with complaints of sinus pressure and nasal drainage more pronounced starting yesterday.  However he reports "dealing with allergies" since April.  Using Flonase 1 squirt each nostril "as needed".  Not on antihistamine at this time.  No fever.  No colored nasal drainage.  Infrequent cough.  No known exposures to COVID that he is aware.        Constitution: Negative for fever.        Objective:   The physical exam was conducted virtually.  Physical Exam   Constitutional: He is oriented to person, place, and time. He appears well-developed.  Non-toxic appearance. He does not appear ill. No distress.   HENT:   Head: Normocephalic and atraumatic.   Ears:   Right Ear: External ear normal.   Left Ear: External ear normal.   Eyes: Extraocular movement intact      Comments: Complains of pressure over maxillary sinuses.   Pulmonary/Chest: Effort normal. No stridor. No respiratory distress.   Abdominal: Normal appearance.   Neurological: He is alert and oriented to person, place, and time.   Skin: Skin is not diaphoretic.   Psychiatric: His behavior is normal. Thought content normal.   Nursing note and vitals reviewed.      Assessment:     1. Allergic rhinitis, unspecified seasonality, unspecified trigger  "       Plan:       Allergic rhinitis, unspecified seasonality, unspecified trigger  -     predniSONE (DELTASONE) 20 MG tablet; Take 1 tablet (20 mg total) by mouth once daily. for 3 days  Dispense: 3 tablet; Refill: 0    AS WE DISCUSSED, I WOULD ALSO LIKE YOU TO DO A HOME COVID TEST.  CALL BACK IN THE EVENT IT IS POSITIVE.    TRY A NONSEDATING ANTIHISTAMINE SUCH AS ZYRTEC OR CLARITAN OR ALLEGRA OR XYZAL (OR GENERICS FOR THESE) DAILY AS NEEDED.     TRY USING FLONASE, 2 INHALATIONS EACH NOSTRIL 1-2 TIMES DAILY, AND ON A REGULAR BASIS DURING DIFFICULT TIMES TO ADDRESS YOUR ALLERGY SYMPTOMS.       Make sure that you follow up with your primary care doctor in the next 2-5 days if needed .  Return to or go to Urgent Care if signs or symptoms change and certainly if you have worsening symptoms go to the nearest emergency department for further evaluation.

## 2024-06-12 ENCOUNTER — PATIENT MESSAGE (OUTPATIENT)
Dept: FAMILY MEDICINE | Facility: CLINIC | Age: 29
End: 2024-06-12
Payer: COMMERCIAL

## 2024-06-19 DIAGNOSIS — G47.33 OSA (OBSTRUCTIVE SLEEP APNEA): Primary | ICD-10-CM

## 2024-06-20 ENCOUNTER — TELEPHONE (OUTPATIENT)
Dept: NEUROLOGY | Facility: CLINIC | Age: 29
End: 2024-06-20
Payer: COMMERCIAL

## 2024-06-20 ENCOUNTER — OFFICE VISIT (OUTPATIENT)
Dept: NEUROLOGY | Facility: CLINIC | Age: 29
End: 2024-06-20
Payer: COMMERCIAL

## 2024-06-20 VITALS
HEIGHT: 73 IN | HEART RATE: 70 BPM | BODY MASS INDEX: 41.75 KG/M2 | DIASTOLIC BLOOD PRESSURE: 91 MMHG | WEIGHT: 315 LBS | SYSTOLIC BLOOD PRESSURE: 144 MMHG

## 2024-06-20 DIAGNOSIS — G56.03 BILATERAL CARPAL TUNNEL SYNDROME: Primary | ICD-10-CM

## 2024-06-20 DIAGNOSIS — R20.2 PARESTHESIA OF HAND, BILATERAL: ICD-10-CM

## 2024-06-20 DIAGNOSIS — M54.2 NECK PAIN: ICD-10-CM

## 2024-06-20 PROCEDURE — 1159F MED LIST DOCD IN RCRD: CPT | Mod: CPTII,S$GLB,, | Performed by: STUDENT IN AN ORGANIZED HEALTH CARE EDUCATION/TRAINING PROGRAM

## 2024-06-20 PROCEDURE — 3077F SYST BP >= 140 MM HG: CPT | Mod: CPTII,S$GLB,, | Performed by: STUDENT IN AN ORGANIZED HEALTH CARE EDUCATION/TRAINING PROGRAM

## 2024-06-20 PROCEDURE — 99999 PR PBB SHADOW E&M-EST. PATIENT-LVL IV: CPT | Mod: PBBFAC,,, | Performed by: STUDENT IN AN ORGANIZED HEALTH CARE EDUCATION/TRAINING PROGRAM

## 2024-06-20 PROCEDURE — 99204 OFFICE O/P NEW MOD 45 MIN: CPT | Mod: S$GLB,,, | Performed by: STUDENT IN AN ORGANIZED HEALTH CARE EDUCATION/TRAINING PROGRAM

## 2024-06-20 PROCEDURE — 1160F RVW MEDS BY RX/DR IN RCRD: CPT | Mod: CPTII,S$GLB,, | Performed by: STUDENT IN AN ORGANIZED HEALTH CARE EDUCATION/TRAINING PROGRAM

## 2024-06-20 PROCEDURE — 3080F DIAST BP >= 90 MM HG: CPT | Mod: CPTII,S$GLB,, | Performed by: STUDENT IN AN ORGANIZED HEALTH CARE EDUCATION/TRAINING PROGRAM

## 2024-06-20 PROCEDURE — 3008F BODY MASS INDEX DOCD: CPT | Mod: CPTII,S$GLB,, | Performed by: STUDENT IN AN ORGANIZED HEALTH CARE EDUCATION/TRAINING PROGRAM

## 2024-06-20 RX ORDER — GABAPENTIN 100 MG/1
100 CAPSULE ORAL NIGHTLY
Qty: 30 CAPSULE | Refills: 11 | Status: SHIPPED | OUTPATIENT
Start: 2024-06-20 | End: 2025-06-20

## 2024-06-20 NOTE — PROGRESS NOTES
Patient ID: [id number]  Referring Physician: Adam Kenney MD    Chief Complaint/Reason for Consult: hand numbness     Subjective:     HPI  James Jeff is a 28 y.o. RH male with psoriasis and MYRIAM. he is presenting today for evaluation of hand numbness. he is accompanied by his wife.    He reports 4 months of bilateral hand numbness, mainly the first 3 finger tips. Worse at nights when sleeping or after a long day at work.  Symptoms are becoming more frequent. 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   He had shooting neck pain about 1.5 years ago. Along with pain in the back of left shoulder and around the chest. He has done PT and optimized his sleeping position which helped.  He denies any other focal neurological symptoms such as weakness, visual disturbance, imbalance or B/B disturbances.     Review of Systems   Eyes:  Negative for visual disturbance.   Gastrointestinal:  Negative for fecal incontinence.   Genitourinary:  Negative for bladder incontinence.   Musculoskeletal:  Positive for neck pain (has resolved currently). Negative for gait problem.   Neurological:  Positive for numbness. Negative for vertigo, facial asymmetry, speech difficulty and weakness.   Psychiatric/Behavioral:  Negative for agitation and confusion.    All other systems reviewed and are negative.    Past Medical History:  Active Ambulatory Problems     Diagnosis Date Noted    Psoriasis 04/27/2023    Sleep disturbance 08/03/2023    MYRIAM (obstructive sleep apnea) 11/29/2023    Morbid obesity with BMI of 40.0-44.9, adult 05/15/2024     Resolved Ambulatory Problems     Diagnosis Date Noted    Class 2 obesity due to excess calories without serious comorbidity with body mass index (BMI) of 39.0 to 39.9 in adult 04/27/2023     Past Medical History:   Diagnosis Date    GERD (gastroesophageal reflux disease)        Allergies:  Review of patient's allergies indicates:  No Known  Allergies    Pertinent Family History:  Family History   Problem Relation Name Age of Onset    Miscarriages / Stillbirths Mother      Hypertension Mother      Valvular heart disease Mother          on Coumadin    Stroke Mother      Diabetes Mother      Obesity Mother      Alcohol abuse Father      Alcohol abuse Brother         Pertinent Social History:  Social History     Tobacco Use    Smoking status: Former     Current packs/day: 0.00     Types: Cigarettes, Vaping with nicotine     Start date: 2010     Quit date: 2022     Years since quittin.2     Passive exposure: Past    Smokeless tobacco: Never   Substance Use Topics    Alcohol use: Yes     Alcohol/week: 2.0 standard drinks of alcohol     Types: 1 Cans of beer, 1 Shots of liquor per week     Comment: rare    Drug use: Not Currently       Medications:  Current Outpatient Medications   Medication Instructions    acetaminophen-codeine 300-30mg (TYLENOL #3) 300-30 mg Tab 1 tablet, Oral, Every 6 hours PRN    clindamycin (CLEOCIN T) 1 % lotion Topical (Top), 2 times daily    clobetasoL (TEMOVATE) 0.05 % cream Topical (Top), 2 times daily, Prn foot rash flare.Stop using steroid topical when skin is smooth and non itchy.  Do not treat dark or red coloring.    ibuprofen (ADVIL,MOTRIN) 800 mg, Oral, Every 8 hours PRN    naproxen (NAPROSYN) 500 mg, Oral, 2 times daily PRN    propranoloL (INDERAL) 20 mg, Oral, 2 times daily PRN    tadalafiL (CIALIS) 20 mg, Oral, Daily PRN        Objective:     Vitals:    24 0900   BP: (!) 144/91   Pulse: 70        General:  Well-appearing, well-nourished, NAD, cooperative    Neurologic Exam:   Awake, alert and oriented x3  Speech spontaneous and fluent, intact comprehension.   Adequate fund of knowledge, vocabulary.    CN II - CN XII:  EOM intact. No ophthalmoplegia.   Facial expression is full and symmetric.   Hearing is intact bilaterally.   Palate elevates symmetrically.   SCM and Trapezius full strength bilaterally.      Motor:  Normal bulk and tone in all four limbs.   Slight postural tremor on the left hand.     Shoulder  Abd Shoulder Add Elbow   Flex Elbow  Ext Wrist   Flex Wrist  Ext Finger  Flex Finger  Ext Finger  Abd Finger   Add IO Opposition   Right 5 5 5 5 5 5 5 5 5 5 5 5   Left 5 5 5 5 5 5 5 5 5 5 5 5      Hip  Flex Hip  Ext Thigh   Abd Thigh  Add Knee  Flex Knee  Ext Plantar  Flex Dorsiflex   Right 5 5   5 5 5 5   Left 5 5   5 5 5 5     Sensory:  Light touch: normal tactile sense throughout. Positive Tinel's sign, negative Phalen's test  Proprioception: proprioceptive sense present on RUE and on LUE    Coordination:  Finger to nose is normal bilaterally.  Normal fine finger movements and rapid alternating movements.    Gait:  Normal casual gait.      Pertinent lab results  Lab Results   Component Value Date    OGM39QDQO Negative 07/21/2022    HEPBSAG Negative 08/12/2022     Lab Results   Component Value Date    TSH 1.205 05/03/2023    WBC 9.75 04/29/2024    LYMPH 3.8 04/29/2024    LYMPH 38.8 04/29/2024    RBC 5.33 04/29/2024    HGB 15.0 04/29/2024    HCT 44.9 04/29/2024    MCV 84 04/29/2024     04/29/2024     04/29/2024    K 4.5 04/29/2024    CO2 25 04/29/2024    BUN 17 04/29/2024    CREATININE 0.9 04/29/2024    CALCIUM 9.5 04/29/2024    AST 35 04/29/2024    ALT 77 (H) 04/29/2024       Pertinent imaging results  *No relevant imaging available to review     Other pertinent studies  None    Assessment:   James Jeff is a 28 y.o. RH male with psoriasis and MYRIAM who presents with bilateral hand numbness/paresthesias within the median nerve distribution with positive Tinel's sign. He doesn't have any weakness or muscle atrophy. We will obtain EMG-NCS to ensure there is no component of cervical radiculopathy given his previous neck pain and occasional radiating pain. He will use a wrist brace in the interim along with a small dose of gabapentin at night when needed. If symptoms fail to resolve, I  will refer him to the hand clinic for steroid injections.     1. Bilateral carpal tunnel syndrome    2. Paresthesia of hand, bilateral    3. Neck pain       Plan:     - Use wrist brace, especially in sleep   - EMG W/ ULTRASOUND AND NERVE CONDUCTION TEST 2 Extremities; Future  - gabapentin (NEURONTIN) 100 MG capsule; Take 1 capsule (100 mg total) by mouth every evening.  Dispense: 30 capsule; Refill: 11      Plan was discussed in detail with the patient, who is in agreement.      Disclaimer: This note was partly generated using dictation software which may occasionally result in transcription errors that are missed on review.      Based on our encounter today, my overall Medical Decision Making is a Level 4     Complexity of Problem: Moderate (1 or more chronic illnesses with exacerbation, progression or treatment side effects)  Complexity of Data: Limited (Ordering each unique test)  Risk of Complications and/or morbidity/mortality of Management: Moderate risk (Prescription drug management)          Anupama Lockwood MD    [facility]  06/20/2024

## 2024-06-20 NOTE — TELEPHONE ENCOUNTER
Spoke with the pt, scheduled appt with Dr Floyd for EMG of BUE ordered by Dr Lockwood. The pt requests a sooner appt with PMR if available. Please call the pt to discuss.

## 2024-06-20 NOTE — TELEPHONE ENCOUNTER
----- Message from Paul Cadena MA sent at 6/20/2024 10:05 AM CDT -----  Good Morning,      This patient was seen today in our clinic by Dr. Lockwood. She put a order in for him to get EMG. Our table at [facility] has a 325 weight limit patient weights 345. Can someone please reach out to him and get him scheduled? He wanted to go to [place], but did not want to wait until October. I did apologize for for the inconvenience but we won't be able to schedule him at the [facility] location.  He understood and is waiting to hear from someone. Also mention does not mind driving to the location.          Thanks,  Shelia OLIVARES MA

## 2024-06-21 ENCOUNTER — TELEPHONE (OUTPATIENT)
Dept: NEUROLOGY | Facility: CLINIC | Age: 29
End: 2024-06-21
Payer: COMMERCIAL

## 2024-06-21 ENCOUNTER — TELEPHONE (OUTPATIENT)
Dept: PHYSICAL MEDICINE AND REHAB | Facility: CLINIC | Age: 29
End: 2024-06-21
Payer: COMMERCIAL

## 2024-06-21 NOTE — TELEPHONE ENCOUNTER
LVM to call back regarding a sooner  EMG appt. There is a open slot with Dr. Garcia on 7/25@8am.  Patient would need to call back and confirm appt changes, otherwise will stay as is.

## 2024-06-21 NOTE — TELEPHONE ENCOUNTER
Spoke w/pt and there is no sooner appt for emg and I offered for pt to be placed on the waiting list and pt stated that he would keep his appt  @  the [place] location on 08/13/24     Thank you            ----- Message from Goldie Melton MA sent at 6/20/2024  4:56 PM CDT -----    ----- Message -----  From: Paul Cadena MA  Sent: 6/20/2024  10:19 AM CDT  To: [facility] Staff; #    Good Morning,      This patient was seen today in our clinic by Dr. Lockwood. She put a order in for him to get EMG. Our table at [facility] has a 325 weight limit patient weights 345. Can someone please reach out to him and get him scheduled? He wanted to go to [place], but did not want to wait until October. I did apologize for for the inconvenience but we won't be able to schedule him at the [facility] location.  He understood and is waiting to hear from someone. Also mention does not mind driving to the location.          Thanks,  Shelia OLIVARES MA

## 2024-06-24 ENCOUNTER — TELEPHONE (OUTPATIENT)
Dept: PHYSICAL MEDICINE AND REHAB | Facility: CLINIC | Age: 29
End: 2024-06-24
Payer: COMMERCIAL

## 2024-06-26 ENCOUNTER — ON-DEMAND VIRTUAL (OUTPATIENT)
Dept: URGENT CARE | Facility: CLINIC | Age: 29
End: 2024-06-26
Payer: COMMERCIAL

## 2024-06-26 DIAGNOSIS — K21.9 GASTROESOPHAGEAL REFLUX DISEASE, UNSPECIFIED WHETHER ESOPHAGITIS PRESENT: Primary | ICD-10-CM

## 2024-06-26 DIAGNOSIS — Z02.89 ENCOUNTER TO OBTAIN EXCUSE FROM WORK: ICD-10-CM

## 2024-06-26 PROCEDURE — 99212 OFFICE O/P EST SF 10 MIN: CPT | Mod: 95,,, | Performed by: NURSE PRACTITIONER

## 2024-06-26 NOTE — PROGRESS NOTES
Subjective:      Patient ID: James Jeff is a 28 y.o. male.    Vitals:  vitals were not taken for this visit.     Chief Complaint: Gastroesophageal Reflux and Letter for School/Work      Visit Type: TELE AUDIOVISUAL    Present with the patient at the time of consultation: TELEMED PRESENT WITH PATIENT: None, at home    Past Medical History:   Diagnosis Date    GERD (gastroesophageal reflux disease)     Psoriasis      Past Surgical History:   Procedure Laterality Date    HAND SURGERY  2009     Review of patient's allergies indicates:  No Known Allergies  Current Outpatient Medications on File Prior to Visit   Medication Sig Dispense Refill    acetaminophen-codeine 300-30mg (TYLENOL #3) 300-30 mg Tab Take 1 tablet by mouth every 6 (six) hours as needed.      clindamycin (CLEOCIN T) 1 % lotion Apply topically 2 (two) times daily. 60 mL 1    clobetasoL (TEMOVATE) 0.05 % cream Apply topically 2 (two) times daily. Prn foot rash flare.Stop using steroid topical when skin is smooth and non itchy.  Do not treat dark or red coloring. 60 g 1    gabapentin (NEURONTIN) 100 MG capsule Take 1 capsule (100 mg total) by mouth every evening. 30 capsule 11    ibuprofen (ADVIL,MOTRIN) 800 MG tablet Take 800 mg by mouth every 8 (eight) hours as needed.      naproxen (NAPROSYN) 500 MG tablet Take 1 tablet (500 mg total) by mouth 2 (two) times daily as needed (pain). 60 tablet 0    propranoloL (INDERAL) 20 MG tablet Take 1 tablet (20 mg total) by mouth 2 (two) times daily as needed (anxiety; pulse greater than 90). 60 tablet 1    tadalafiL (CIALIS) 20 MG Tab Take 1 tablet (20 mg total) by mouth daily as needed (take 1 hour  prior to sexual activty). 30 tablet 2     No current facility-administered medications on file prior to visit.     Family History   Problem Relation Name Age of Onset    Miscarriages / Stillbirths Mother      Hypertension Mother      Valvular heart disease Mother          on Coumadin    Stroke Mother       Diabetes Mother      Obesity Mother      Alcohol abuse Father      Alcohol abuse Brother             Ohs Peq Odvv Intake    6/26/2024  9:38 AM CDT - Filed by Patient   What is your current physical address in the event of a medical emergency? [address]   Are you able to take your vital signs? No   Please attach any relevant images or files          Hx of GERD. Woke up this AM with indigestion and reflux symptoms. Symptoms improving. Needs a work excuse only.    Gastroesophageal Reflux  He complains of heartburn. He reports no nausea.       Gastrointestinal:  Positive for heartburn. Negative for nausea, vomiting and diarrhea.        Objective:   The physical exam was conducted virtually.  Physical Exam   Constitutional: He is oriented to person, place, and time. He does not appear ill. No distress.   HENT:   Head: Normocephalic and atraumatic.   Nose: Nose normal.   Eyes: Extraocular movement intact   Pulmonary/Chest: Effort normal.   Abdominal: Normal appearance.   Musculoskeletal: Normal range of motion.         General: Normal range of motion.   Neurological: no focal deficit. He is alert and oriented to person, place, and time.   Psychiatric: His behavior is normal. Mood normal.   Vitals reviewed.      Assessment:     1. Gastroesophageal reflux disease, unspecified whether esophagitis present    2. Encounter to obtain excuse from work        Plan:   Patient encouraged to monitor symptoms closely and instructed to follow-up for new or worsening symptoms. Further, in-person, evaluation may be necessary for continued treatment. Please follow up with your primary care doctor or specialist as needed. Verbally discussed plan. Patient confirms understanding and is in agreement with treatment and plan.     You must understand that you've received a [facility] Care evaluation only and that you may be released before all your medical problems are known or treated. You, the patient, will arrange for follow up  care as instructed.      Gastroesophageal reflux disease, unspecified whether esophagitis present    Encounter to obtain excuse from work

## 2024-06-26 NOTE — LETTER
June 26, 2024    James Jeff  [address] [place] [address] [address] [address]             Virtual Visit - Urgent Care  Urgent Care  [address] [facility] [id number]   June 26, 2024     Patient: James Jeff   YOB: 1995   Date of Visit: 6/26/2024       To Whom it May Concern:    James Jeff was seen virtually on 6/26/2024. He may return to work on or after 6/27/24 provided symptoms have improved .    Please excuse him from any classes or work missed.    If you have any questions or concerns, please don't hesitate to call.    Sincerely,         Hazel Wan, NP

## 2024-08-13 ENCOUNTER — PROCEDURE VISIT (OUTPATIENT)
Dept: NEUROLOGY | Facility: CLINIC | Age: 29
End: 2024-08-13
Payer: COMMERCIAL

## 2024-08-13 DIAGNOSIS — R20.2 PARESTHESIA OF HAND, BILATERAL: ICD-10-CM

## 2024-08-13 DIAGNOSIS — G56.03 BILATERAL CARPAL TUNNEL SYNDROME: Primary | ICD-10-CM

## 2024-08-13 PROCEDURE — 95910 NRV CNDJ TEST 7-8 STUDIES: CPT | Mod: S$GLB,,, | Performed by: PSYCHIATRY & NEUROLOGY

## 2024-08-13 PROCEDURE — 95886 MUSC TEST DONE W/N TEST COMP: CPT | Mod: S$GLB,,, | Performed by: PSYCHIATRY & NEUROLOGY

## 2024-08-22 ENCOUNTER — ON-DEMAND VIRTUAL (OUTPATIENT)
Dept: URGENT CARE | Facility: CLINIC | Age: 29
End: 2024-08-22
Payer: COMMERCIAL

## 2024-08-22 DIAGNOSIS — M25.519 ACUTE SHOULDER PAIN, UNSPECIFIED LATERALITY: Primary | ICD-10-CM

## 2024-08-22 PROCEDURE — 99213 OFFICE O/P EST LOW 20 MIN: CPT | Mod: 95,,, | Performed by: NURSE PRACTITIONER

## 2024-08-22 NOTE — PROGRESS NOTES
Subjective:      Patient ID: James Jeff is a 28 y.o. male.    Vitals:  vitals were not taken for this visit.     Chief Complaint: Neck Pain (Work note)      Visit Type: TELE AUDIOVISUAL    Present with the patient at the time of consultation: TELEMED PRESENT WITH PATIENT: None        Past Medical History:   Diagnosis Date    GERD (gastroesophageal reflux disease)     Psoriasis      Past Surgical History:   Procedure Laterality Date    HAND SURGERY  2009     Review of patient's allergies indicates:  No Known Allergies  Current Outpatient Medications on File Prior to Visit   Medication Sig Dispense Refill    acetaminophen-codeine 300-30mg (TYLENOL #3) 300-30 mg Tab Take 1 tablet by mouth every 6 (six) hours as needed.      clindamycin (CLEOCIN T) 1 % lotion Apply topically 2 (two) times daily. 60 mL 1    clobetasoL (TEMOVATE) 0.05 % cream Apply topically 2 (two) times daily. Prn foot rash flare.Stop using steroid topical when skin is smooth and non itchy.  Do not treat dark or red coloring. 60 g 1    gabapentin (NEURONTIN) 100 MG capsule Take 1 capsule (100 mg total) by mouth every evening. 30 capsule 11    ibuprofen (ADVIL,MOTRIN) 800 MG tablet Take 800 mg by mouth every 8 (eight) hours as needed.      naproxen (NAPROSYN) 500 MG tablet Take 1 tablet (500 mg total) by mouth 2 (two) times daily as needed (pain). 60 tablet 0    propranoloL (INDERAL) 20 MG tablet Take 1 tablet (20 mg total) by mouth 2 (two) times daily as needed (anxiety; pulse greater than 90). 60 tablet 1    tadalafiL (CIALIS) 20 MG Tab Take 1 tablet (20 mg total) by mouth daily as needed (take 1 hour  prior to sexual activty). 30 tablet 2     No current facility-administered medications on file prior to visit.     Family History   Problem Relation Name Age of Onset    Miscarriages / Stillbirths Mother      Hypertension Mother      Valvular heart disease Mother          on Coumadin    Stroke Mother      Diabetes Mother      Obesity  Mother      Alcohol abuse Father      Alcohol abuse Brother             Ohs Peq Odvv Intake    8/22/2024  7:45 AM CDT - Filed by Patient   What is your current physical address in the event of a medical emergency? [address], [place] [address]   Are you able to take your vital signs? No   Please attach any relevant images or files          29 yo male with c/o neck pain. He states prior to his early shift he was eating breakfast and popped his neck. He states started with upper back pain. 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 He states he left work early. He states he did hydrobed at gym which did help. He states he needs a note for work.         Constitution: Negative.   HENT: Negative.     Cardiovascular: Negative.    Eyes: Negative.    Respiratory: Negative.     Gastrointestinal: Negative.  Negative for bowel incontinence.   Endocrine: negative.   Genitourinary: Negative.  Negative for dysuria, flank pain, bladder incontinence and pelvic pain.   Musculoskeletal:  Positive for back pain. Negative for pain and abnormal ROM of joint.   Skin: Negative.    Allergic/Immunologic: Negative.    Neurological: Negative.    Hematologic/Lymphatic: Negative.    Psychiatric/Behavioral: Negative.          Objective:   The physical exam was conducted virtually.  LOCATION OF PATIENT home  Physical Exam   Constitutional: He is oriented to person, place, and time. He appears well-developed.   HENT:   Head: Normocephalic and atraumatic.   Ears:   Right Ear: Hearing, tympanic membrane and external ear normal.   Left Ear: Hearing, tympanic membrane and external ear normal.   Nose: Nose normal.   Mouth/Throat: Uvula is midline, oropharynx is clear and moist and mucous membranes are normal.   Eyes: Conjunctivae and EOM are normal. Pupils are equal, round, and reactive to light.   Neck: Neck supple.   Cardiovascular: Normal rate.   Pulmonary/Chest: Effort normal and breath sounds normal.    Musculoskeletal: Normal range of motion.         General: Normal range of motion.   Neurological: He is alert and oriented to person, place, and time.   Skin: Skin is warm.   Psychiatric: His behavior is normal. Thought content normal.   Nursing note and vitals reviewed.      Assessment:     1. Acute shoulder pain, unspecified laterality        Plan:     Ice to area.  Ibuprofen 600 mg every 8-12 hours as needed Alternating   Drink plenty of fluids  Rest.     Acute shoulder pain, unspecified laterality

## 2024-08-22 NOTE — LETTER
August 22, 2024    James Jeff  [address] [place] [address] [address] [address]             Virtual Visit - Urgent Care  Urgent Care  [address] [facility] [id number]   August 22, 2024     Patient: James Jeff   YOB: 1995   Date of Visit: 8/22/2024       To Whom it May Concern:    James Jeff was seen virtually on 8/22/2024. He may return to work on 08/23/2024 .    Please excuse him from any classes or work missed.    If you have any questions or concerns, please don't hesitate to call.    Sincerely,           Esperanza Locke, MARIANOP

## 2024-08-26 ENCOUNTER — OFFICE VISIT (OUTPATIENT)
Dept: FAMILY MEDICINE | Facility: CLINIC | Age: 29
End: 2024-08-26
Payer: COMMERCIAL

## 2024-08-26 VITALS
HEART RATE: 68 BPM | HEIGHT: 73 IN | OXYGEN SATURATION: 97 % | RESPIRATION RATE: 18 BRPM | SYSTOLIC BLOOD PRESSURE: 136 MMHG | TEMPERATURE: 98 F | BODY MASS INDEX: 41.75 KG/M2 | WEIGHT: 315 LBS | DIASTOLIC BLOOD PRESSURE: 82 MMHG

## 2024-08-26 DIAGNOSIS — G47.33 OSA (OBSTRUCTIVE SLEEP APNEA): ICD-10-CM

## 2024-08-26 DIAGNOSIS — E66.01 MORBID OBESITY WITH BMI OF 40.0-44.9, ADULT: ICD-10-CM

## 2024-08-26 DIAGNOSIS — L40.9 PSORIASIS: ICD-10-CM

## 2024-08-26 DIAGNOSIS — R09.81 CHRONIC NASAL CONGESTION: ICD-10-CM

## 2024-08-26 DIAGNOSIS — Z00.00 VISIT FOR WELL MAN HEALTH CHECK: Primary | ICD-10-CM

## 2024-08-26 PROCEDURE — 1159F MED LIST DOCD IN RCRD: CPT | Mod: CPTII,S$GLB,, | Performed by: FAMILY MEDICINE

## 2024-08-26 PROCEDURE — 3008F BODY MASS INDEX DOCD: CPT | Mod: CPTII,S$GLB,, | Performed by: FAMILY MEDICINE

## 2024-08-26 PROCEDURE — 1160F RVW MEDS BY RX/DR IN RCRD: CPT | Mod: CPTII,S$GLB,, | Performed by: FAMILY MEDICINE

## 2024-08-26 PROCEDURE — 99999 PR PBB SHADOW E&M-EST. PATIENT-LVL V: CPT | Mod: PBBFAC,,, | Performed by: FAMILY MEDICINE

## 2024-08-26 PROCEDURE — 99395 PREV VISIT EST AGE 18-39: CPT | Mod: S$GLB,,, | Performed by: FAMILY MEDICINE

## 2024-08-26 PROCEDURE — 3077F SYST BP >= 140 MM HG: CPT | Mod: CPTII,S$GLB,, | Performed by: FAMILY MEDICINE

## 2024-08-26 PROCEDURE — 3080F DIAST BP >= 90 MM HG: CPT | Mod: CPTII,S$GLB,, | Performed by: FAMILY MEDICINE

## 2024-08-26 NOTE — PROGRESS NOTES
"  Well Man VISIT      CHIEF COMPLAINT  Chief Complaint   Patient presents with    Follow-up    Medication Refill    Anxiety       HPI  James Jeff is a 28 y.o. male who presents for wellness.     Social Factors  Tobacco use: No  Ready to Quit: No  Alcohol: Yes socially  Intimate partner violence screening  "Do you feel safe in your current relationship?" Yes   "Have you ever been in a relationship in which your partner frightened you or hurt you?" No  Living Will/POA: No  Regular Exercise: No    Depression  Over the past two weeks, have you felt down, depressed, or hopeless? No  Over the past two weeks, have you felt little interest or pleasure in doing things? No    Reproductive Health  STD screening in last year: deferred  HIV screening: ordered    Screen for Chronic Disease  CHD Risk Factors: male gender and obesity (BMI >= 30 kg/m2)  Estimated body mass index is 44.09 kg/m² as calculated from the following:    Height as of this encounter: 6' 1" (1.854 m).    Weight as of this encounter: 151.6 kg (334 lb 3.5 oz).  Dyslipidemia screening needed: ordered  T2DM screening needed:  ordered  Colonoscopy needed: n/a  PSA needed: n/a  AAA screening needed:n/a  Screen men 35 years and older, and men 20 to 34 years of age who have cardiovascular risk factors for dyslipidemia  Begin screening colonoscopies at 50 years of age in men of average risk, and continue until 75 years of age; offer fecal occult blood testing every year, flexible sigmoidoscopy every five years combined with fecal occult blood testing every three years, or colonoscopy every 10 years   The American Urological Association recommends offering PSA testing and digital rectal examination to well-informed men beginning at 40 years of age and continuing until life expectancy is less than 10 years  Screen once with ultrasonography in men 65 to 75 years of age if they have a family history or have smoked at [id number] cigarettes in their " "lifetime  Screen men with a sustained blood pressure greater than 135/80 mm Hg for T2DM      Immunizations  delayed    ALLERGIES and MEDS were verified.   PMHx, PSHx, FHx, SOCIALHx were updated as pertinent.    REVIEW OF SYSTEMS  Review of Systems   Constitutional: Negative.    HENT: Negative.     Eyes: Negative.    Respiratory: Negative.     Cardiovascular: Negative.    Gastrointestinal: Negative.    Genitourinary: Negative.    Musculoskeletal: Negative.    Skin:  Positive for rash.        +lump on shoulder         PHYSICAL EXAM  VITAL SIGNS: /82 Comment: home reading  Pulse 68   Temp 98.4 °F (36.9 °C) (Oral)   Resp 18   Ht 6' 1" (1.854 m)   Wt (!) 151.6 kg (334 lb 3.5 oz)   SpO2 97%   BMI 44.09 kg/m²   GEN: Well developed, Well nourished, No acute distress.  HENT: Normocephalic, Atraumatic, Bilateral external ears normal, Nose normal, Oropharynx moist, No oral exudates.   Eyes: PERR, EOMI, Conjunctiva normal, No discharge.   Neck: Supple, No tenderness.  Lymphatic: No cervical or supraclavicular lymphadenopathy noted.   Cardiovascular: Normal heart rate, Normal rhythm, No murmurs, No rubs, No gallops.   Thorax & Lungs: Normal breath sounds, No respiratory distress, No wheezing.  Abdomen: Soft, No tenderness, Bowel sounds normal.  Genital: deferred  Skin: Warm, Dry, No erythema, No rash.   Extremities: No edema, No tenderness.       ASSESSMENT/PLAN    James Terry" was seen today for follow-up, medication refill and anxiety.    Diagnoses and all orders for this visit:    Visit for Eyelation [place] health check  - Labs up to date    Morbid obesity with BMI of 40.0-44.9, adult  - Patient now going to the gym 4 days a week  - He has lost weight and has been encouraged to continue working on diet to reduce sugar intake    Chronic nasal congestion  -     Ambulatory referral/consult to ENT; Future  - Frequent night time nasal congestion for which he uses Zycam  - He stays moderately congested throughout the " "day  - Was on flonase for "years"    Psoriasis  - controlled    MYRIAM (obstructive sleep apnea)  - Unable to use cpap at night as much as he was due to nasal congestion         FOLLOW UP: 1 year or sooner if needed        "

## 2024-08-26 NOTE — PROGRESS NOTES
Subjective:     HPI: James Jeff is a 28 y.o. male who was referred to me by Dr. Adam Kenney in consultation for nasal obstruction.    Patient reports bilateral nasal obstruction for as long as he can remember.  Patient has been on Flonase since he was a child without significant improvement in symptoms.  Patient denies any hyposmia or facial pressure.  Symptoms are worse at night and causes him issues with tolerating his CPAP mask.  He has been using Zicam last 2 weeks without any significant improvement in symptoms.  Recently started air purifier at home which seemed to help with his nasal obstruction.  He reports episodes of sneezing and rhinorrhea at certain times when he is home.  He reports carpet in his apartment currently and a history of an allergic rhinitis to his parents' dog.    Current sinonasal medications include flonase, zicam nasal spray.    He does not recall previously having allergy testing.    He relates a diagnosis of obstructive sleep apnea with regular CPAP use.   He recalls a prior history of nasal trauma but no memories of severe pain or broken nose  He has not had sinonasal surgery.    He has not had a tonsillectomy.  He is not a tobacco smoker.     Past Medical/Past Surgical History  Past Medical History:   Diagnosis Date    GERD (gastroesophageal reflux disease)     Psoriasis      He has a past surgical history that includes Hand surgery (2009).    Family History/Social History  His family history includes Alcohol abuse in his brother and father; Diabetes in his mother; Hypertension in his mother; Miscarriages / Stillbirths in his mother; Obesity in his mother; Stroke in his mother; Valvular heart disease in his mother.  He reports that he quit smoking about 2 years ago. His smoking use included cigarettes and vaping with nicotine. He started smoking about 14 years ago. He has been exposed to tobacco smoke. He has never used smokeless tobacco. He reports current alcohol  use of about 2.0 standard drinks of alcohol per week. He reports that he does not currently use drugs.    Allergies/Immunizations  He has No Known Allergies.  Immunization History   Administered Date(s) Administered    COVID-19 MRNA, LN-S PF (MODERNA HALF 0.25 ML DOSE) 02/03/2022    COVID-19 Vaccine 03/31/2021, 04/28/2021, 02/03/2022    COVID-19, MRNA, LN-S, PF (MODERNA FULL 0.5 ML DOSE) 03/31/2021, 04/28/2021    DTaP 02/09/1994, 04/12/1994, 06/14/1994, 02/21/1995    HIB 02/09/1994, 04/12/1994, 06/14/1994, 02/21/1995    Hepatitis B, Pediatric/Adolescent 02/09/1994, 04/12/1994, 11/18/1994    IPV 02/09/1994, 04/12/1994, 06/14/1994    Influenza 11/09/2006, 11/11/2009, 11/17/2010, 11/06/2012    Influenza (Flumist) - Quadrivalent - Intranasal *Preferred* (2-49 years old) 11/06/2012    Influenza - Intranasal 11/09/2006, 11/11/2009, 11/17/2010, 11/06/2012    Influenza - Trivalent (ADULT) 01/12/2009    Influenza - Trivalent - PF (ADULT) 11/04/2011    Influenza A (H1N1) 2009 Monovalent - IM 11/11/2009    MMR 02/21/1995    Meningococcal Conjugate (MCV4P) 11/09/2006    Tdap 11/09/2006, 10/07/2019    Varicella 10/10/1995        Medications   acetaminophen-codeine 300-30mg Tab  clindamycin  clobetasoL  gabapentin  ibuprofen  naproxen  tadalafiL Tab     Review of Systems     Constitutional: Positive for fatigue.      HENT: Positive for sinus pressure.      Eyes:  Positive for eye itching. Negative for photophobia.     Respiratory:  Positive for sleep apnea.      Cardiovascular:  Negative for chest pain, foot swelling, irregular heartbeat and swollen veins.     Gastrointestinal:  Negative for abdominal pain, acid reflux, constipation, diarrhea, heartburn and vomiting.     Genitourinary: Positive for frequent urination. Negative for difficulty urinating and sexual problems.     Musc: Positive for aching muscles and back pain.     Skin: Negative for rash.     Allergy: Positive for seasonal allergies. Negative for food allergies.      Endocrine: Negative for cold intolerance and heat intolerance.      Neurological: Negative for dizziness, headaches, light-headedness, seizures and tremors.      Hematologic: Negative for bruises/bleeds easily and swollen glands.      Psychiatric: Positive for sleep disturbance. Negative for decreased concentration, depression and nervous/anxious.            Objective:     /82 (BP Location: Right arm, Patient Position: Sitting, BP Method: Large (Automatic))   Pulse 67   Wt (!) 152.5 kg (336 lb 3.2 oz)   BMI 44.36 kg/m²      Physical Exam  Vitals reviewed.   Constitutional:       Appearance: Normal appearance.   HENT:      Head: Normocephalic and atraumatic.      Right Ear: Tympanic membrane, ear canal and external ear normal.      Left Ear: Tympanic membrane, ear canal and external ear normal.      Nose: No septal deviation, mucosal edema or rhinorrhea.      Right Nostril: No epistaxis.      Left Nostril: No epistaxis.      Right Turbinates: Enlarged.      Left Turbinates: Enlarged.      Right Sinus: No maxillary sinus tenderness or frontal sinus tenderness.      Left Sinus: No maxillary sinus tenderness or frontal sinus tenderness.      Comments: Negative Modified Kyler's  Audible nasal breathing     Mouth/Throat:      Lips: Pink.      Mouth: Mucous membranes are moist.      Dentition: Normal dentition.      Tongue: No lesions. Tongue does not deviate from midline.      Palate: No mass and lesions.      Pharynx: Oropharynx is clear. Uvula midline. No uvula swelling.      Tonsils: No tonsillar exudate or tonsillar abscesses. 1+ on the right. 1+ on the left.      Comments: + macroglossia  Eyes:      Extraocular Movements: Extraocular movements intact.      Conjunctiva/sclera: Conjunctivae normal.   Musculoskeletal:      Cervical back: No tenderness.   Lymphadenopathy:      Cervical: No cervical adenopathy.   Neurological:      Mental Status: He is alert.   Psychiatric:         Mood and Affect: Mood  "normal.         Behavior: Behavior normal.          Procedure    Nasal endoscopy performed.  See procedure note.    LEFT              RIGHT                Data Reviewed  I personally reviewed the chart, including any outside records, and pertinent data below:    I reviewed the following notes Internal Medicine     WBC (K/uL)   Date Value   04/29/2024 9.75     Eosinophil % (%)   Date Value   04/29/2024 4.4     Eos # (K/uL)   Date Value   04/29/2024 0.4     Platelets (K/uL)   Date Value   04/29/2024 370     Glucose (mg/dL)   Date Value   04/29/2024 100     No results found for: "IGE"    No sinus imaging available.    Assessment & Plan:     1. Nasal obstruction  2. Chronic rhinitis  3. Hypertrophy of inferior nasal turbinate  -    nasal endoscopy without polyps, purulence, or masses  - checking inhalant immuno caps  - Prescribed patient to START azelastine and also educated patient on how to properly use nasal sprays  -     azelastine (ASTELIN) 137 mcg (0.1 %) nasal spray; 1 spray (137 mcg total) by Nasal route 2 (two) times daily.  Dispense: 30 mL; Refill: 11  - Discussed surgical intervention if above treatment does not resolve symptoms.  - Continue with air purifier    4. Intolerance of continuous positive airway pressure (CPAP) ventilation  5. MYRIAM on CPAP   - discussed surgical intervention should help with nasal obstruction and therefore CPAP tolerance    He will follow up with me as needed  I had a discussion with the patient regarding his condition and the further workup and management options.    All questions were answered, and the patient is in agreement with the above.     Disclaimer:  This note may have been prepared utilizing voice recognition software which may result in occasional typographical errors in the text such as sound alike words.   If further clarification is needed, please contact the ENT department of [facility].  "

## 2024-08-27 ENCOUNTER — LAB VISIT (OUTPATIENT)
Dept: LAB | Facility: HOSPITAL | Age: 29
End: 2024-08-27
Attending: FAMILY MEDICINE
Payer: COMMERCIAL

## 2024-08-27 ENCOUNTER — OFFICE VISIT (OUTPATIENT)
Dept: OTOLARYNGOLOGY | Facility: CLINIC | Age: 29
End: 2024-08-27
Payer: COMMERCIAL

## 2024-08-27 VITALS
WEIGHT: 315 LBS | DIASTOLIC BLOOD PRESSURE: 82 MMHG | HEART RATE: 67 BPM | BODY MASS INDEX: 44.36 KG/M2 | SYSTOLIC BLOOD PRESSURE: 138 MMHG

## 2024-08-27 DIAGNOSIS — J31.0 CHRONIC RHINITIS: ICD-10-CM

## 2024-08-27 DIAGNOSIS — J34.89 NASAL OBSTRUCTION: Primary | ICD-10-CM

## 2024-08-27 DIAGNOSIS — J34.3 HYPERTROPHY OF INFERIOR NASAL TURBINATE: ICD-10-CM

## 2024-08-27 DIAGNOSIS — G47.33 OSA ON CPAP: ICD-10-CM

## 2024-08-27 DIAGNOSIS — Z78.9 INTOLERANCE OF CONTINUOUS POSITIVE AIRWAY PRESSURE (CPAP) VENTILATION: ICD-10-CM

## 2024-08-27 LAB — IGE SERPL-ACNC: <35 IU/ML (ref 0–100)

## 2024-08-27 PROCEDURE — 3075F SYST BP GE 130 - 139MM HG: CPT | Mod: CPTII,S$GLB,, | Performed by: PHYSICIAN ASSISTANT

## 2024-08-27 PROCEDURE — 86003 ALLG SPEC IGE CRUDE XTRC EA: CPT | Mod: 59 | Performed by: PHYSICIAN ASSISTANT

## 2024-08-27 PROCEDURE — 86003 ALLG SPEC IGE CRUDE XTRC EA: CPT | Performed by: PHYSICIAN ASSISTANT

## 2024-08-27 PROCEDURE — 31231 NASAL ENDOSCOPY DX: CPT | Mod: S$GLB,,, | Performed by: PHYSICIAN ASSISTANT

## 2024-08-27 PROCEDURE — 3079F DIAST BP 80-89 MM HG: CPT | Mod: CPTII,S$GLB,, | Performed by: PHYSICIAN ASSISTANT

## 2024-08-27 PROCEDURE — 3008F BODY MASS INDEX DOCD: CPT | Mod: CPTII,S$GLB,, | Performed by: PHYSICIAN ASSISTANT

## 2024-08-27 PROCEDURE — 36415 COLL VENOUS BLD VENIPUNCTURE: CPT | Performed by: PHYSICIAN ASSISTANT

## 2024-08-27 PROCEDURE — 82785 ASSAY OF IGE: CPT | Performed by: PHYSICIAN ASSISTANT

## 2024-08-27 PROCEDURE — 99204 OFFICE O/P NEW MOD 45 MIN: CPT | Mod: 25,S$GLB,, | Performed by: PHYSICIAN ASSISTANT

## 2024-08-27 PROCEDURE — 99999 PR PBB SHADOW E&M-EST. PATIENT-LVL IV: CPT | Mod: PBBFAC,,, | Performed by: PHYSICIAN ASSISTANT

## 2024-08-27 PROCEDURE — 1159F MED LIST DOCD IN RCRD: CPT | Mod: CPTII,S$GLB,, | Performed by: PHYSICIAN ASSISTANT

## 2024-08-27 RX ORDER — AZELASTINE 1 MG/ML
1 SPRAY, METERED NASAL 2 TIMES DAILY
Qty: 30 ML | Refills: 11 | Status: SHIPPED | OUTPATIENT
Start: 2024-08-27 | End: 2025-08-27

## 2024-08-27 NOTE — PROCEDURES
[facility]  [facility]  [address] [facility] [address]  [place] [address]  [phone]      Full Name: James Jeff Gender: Male  Patient ID: [id number] YOB: 1995      Visit Date: 8/13/2024 2:56 PM  Age: 28 Years  Examining Physician: Cammy Floyd D.O., ABPN, AOBNP, ABEM   Referring Physician: Anupama Lockwood MD  Technologist: TREE Carrion   Height: 6 feet 1 inch  History: Patient complains of numbness and tingling in the first 3 fingers of both hands.  He has been referred for an EMG of both upper extremities.      Sensory NCS      Nerve / Sites Rec. Site Onset Lat Peak Lat NP Amp Segments Distance Velocity Temp.     ms ms µV  cm m/s °C   L Median - Digit II (Antidromic)      Wrist Dig II 3.90 4.75 9.2 Wrist - Dig II 16 41 36.1      Ref.   ?3.40 ?20.0 Ref.  ?50    R Median - Digit II (Antidromic)      Wrist Dig II 3.48 4.38 16.6 Wrist - Dig II 13 37 36.1      Ref.   ?3.40 ?20.0 Ref.  ?50    L Ulnar - Digit V (Antidromic)      Wrist Dig V 2.13 3.08 17.3 Wrist - Dig V 11 52 36.1      Ref.   ?3.10 ?15.0 Ref.  ?50    R Ulnar - Digit V (Antidromic)      Wrist Dig V 2.17 2.94 16.3 Wrist - Dig V 11 51 36.1      Ref.   ?3.10 ?15.0 Ref.  ?50        Motor NCS      Nerve / Sites Muscle Latency Ref. Amplitude Ref. Amp % Duration Segments Distance Lat Diff Ref. Velocity Ref. Temp.     ms ms mV mV % ms  cm ms ms m/s m/s °C   L Median - APB      Wrist APB 5.17 ?3.90 9.2 ?6.0 100 6.15 Wrist - APB      36.1      Elbow APB 9.29  8.8  95.9 6.42 Elbow - Wrist 24 4.13  58 ?50 36.1   R Median - APB      Wrist APB 3.92 ?3.90 7.4 ?6.0 100 6.75 Wrist - APB      36.1      Elbow APB 9.10  6.1  81.9 6.87 Elbow - Wrist 27 5.19  52 ?50 36.1   L Ulnar - ADM      Wrist ADM 2.50 ?3.10 11.1 ?7.0 100 6.17 Wrist - ADM      36.1      B.Elbow ADM 6.33  10.6  95.1 6.52 B.Elbow - Wrist 23 3.83  60 ?50 36.1      A.Elbow ADM 8.04  10.3  92.9 6.44 A.Elbow - B.Elbow 11 1.71  64  36.1   R  Ulnar - ADM      Wrist ADM 2.96 ?3.10 9.4 ?7.0 100 5.79 Wrist - ADM      36.1      B.Elbow ADM 6.83  8.6  91.6 6.15 B.Elbow - Wrist 23 3.88  59 ?50 36.1      A.Elbow ADM 8.50  8.1  86.8 6.08 A.Elbow - B.Elbow 10 1.67  60  36.1       F  Wave      Nerve Fmin Ref.    ms ms   L Median - APB 31.51 ?31.00   L Ulnar - ADM 29.48 ?32.00   R Median - APB 30.63 ?31.00   R Ulnar - ADM 29.74 ?32.00       EMG Summary Table     Spontaneous Recruitment Activation Duration Amplitude Polyphasia Comment   Muscle Ins Act Fib Fasc Pattern - - - - -   L. First dorsal interosseous Normal 0 0 Normal Normal Normal Normal Normal Normal   L. Abductor pollicis brevis Normal 0 0 Sl Dec Normal N/+1 N/+1 N/+1 Normal   L. Pronator teres Normal 0 0 Normal Normal Normal Normal Normal Normal   L. Biceps brachii Normal 0 0 Normal Normal Normal Normal Normal Normal   L. Triceps brachii Normal 0 0 Normal Normal Normal Normal Normal Normal   L. Deltoid Normal 0 0 Normal Normal Normal Normal Normal Normal   R. First dorsal interosseous Normal 0 0 Normal Normal Normal Normal Normal Normal   R. Abductor pollicis brevis Normal 0 0 Sl Dec Normal N/+1 Normal N/+1 Normal   R. Pronator teres Normal 0 0 Normal Normal Normal Normal Normal Normal   R. Biceps brachii Normal 0 0 Normal Normal Normal Normal Normal Normal   R. Triceps brachii Normal 0 0 Normal Normal Normal Normal Normal Normal   R. Deltoid Normal 0 0 Normal Normal Normal Normal Normal Normal   L. Cervical paraspinals Normal 0 0      Normal   R. Cervical paraspinals Normal 0 0      Normal         James Jeff [id number] 8/13/2024 2:56 PM     4 of 4    Summary: Nerve conduction studies were performed on both upper extremities.  Bilateral median sensory responses were prolonged with reduced amplitudes.  Bilateral ulnar sensory responses were normal in amplitude and latency.  Bilateral median motor responses were prolonged with normal amplitudes and velocities.  Bilateral ulnar motor responses were  normal in amplitude, latency and velocity.  Left median minimal F-wave latencies were prolonged.  Right median and bilateral ulnar minimal F-wave latencies were normal.  Needle EMG was performed in both upper extremities.  No active denervation was present in any muscle tested.  Motor units were enlarged and polyphasic with reduced recruitment in the abductor pollicis brevis muscle in both upper extremities.  All other motor unit morphology and recruitment patterns were normal.    Impression: This is an abnormal EMG of both upper extremities.  There is electrophysiologic evidence of chronic, mild, bilateral median mononeuropathies across the wrists (carpal tunnel syndromes) without active denervation.  There is no evidence of any other focal neuropathy, peripheral neuropathy, plexopathy or cervical radiculopathy on this study.    Thank you for referring to the [facility] in [place]. Please feel free to contact the clinic if you have any further questions regarding this study or report.       _____________________________  Cammy Floyd D.O., ABPN, AOBNP, ALIREZA Jeff [id number] 8/13/2024 2:56 PM     4 of 4

## 2024-08-27 NOTE — PROCEDURES
"Nasal/sinus endoscopy    Date/Time: 8/27/2024 1:30 PM    Time out: Immediately prior to procedure a "time out" was called to verify the correct patient, procedure, equipment, support staff and site/side marked as required.    Performed by: Indio Chi PA-C  Authorized by: Indio Chi PA-C    Consent Done?:  Yes (Verbal)  Anesthesia:     Local anesthetic:  Lidocaine 2% and Hema-Synephrine 1/2%    Type of Endoscope:  Flexible    Patient tolerance:  Patient tolerated the procedure well with no immediate complications  Nose:     Procedure Performed:  Nasal Endoscopy  External:      No external nasal deformity  Intranasal:      Mucosa no polyps     Mucosa ulcers not present     No mucosa lesions present     Enlarged turbinates     No septum gross deformity  Nasopharynx:      Adenoids not present     Posterior choanae patent     Eustachian tube patent     Cobblestoning on posterior nasopharyngeal wall    "

## 2024-08-27 NOTE — PATIENT INSTRUCTIONS
Azelastine (Astepro, Astelin)  This is a nasal spray that primarily treats nasal drainage/runny nose (rhinorrhea) and nasal itching.    This works fairly quickly after onset and can be used as needed (does not have to be used as a scheduled medication).  Use as directed, up to 2 times daily.    Nasal Steroid Spray (Flonase, Nasaocort, Rhinocort, Sensimyst)  Generic names for over the counter sprays: fluticasone propionate, triamcinolone acetonide, budesonide  You have been prescribed or instructed to take a nasal steroid spray (Flonase). Some symptoms will experience relief within a few days; however, it may take 2-3 weeks to begin to see improvement. This medication needs to be taken consistently to see results.    Use as directed and please be aware the Flonase takes 7-10 days of consistent use before becoming effective- so try to be patient :)    Helpful hints for maximizing nasal spray medication into the nose  - Use the opposite hand to spray the nostril (example: right hand for left nostril). This will help avoid spraying the medication onto the septum (the area that divides the left and right nasal cavity.)  - Tilt the bottle so that it is facing at a slight angle up or straight back, but avoid pointing the bottle straight up while spraying.   - Gently sniff (do not snort) a few seconds after spraying.

## 2024-08-30 LAB
A ALTERNATA IGE QN: <0.1 KU/L
A FUMIGATUS IGE QN: <0.1 KU/L
BERMUDA GRASS IGE QN: 0.38 KU/L
CAT DANDER IGE QN: <0.1 KU/L
CEDAR IGE QN: <0.1 KU/L
D FARINAE IGE QN: <0.1 KU/L
D PTERONYSS IGE QN: <0.1 KU/L
DEPRECATED CEDAR IGE RAST QL: NORMAL
DEPRECATED TIMOTHY IGE RAST QL: ABNORMAL
DOG DANDER IGE QN: 4.56 KU/L
ELDER IGE QN: <0.1 KU/L
ENGL PLANTAIN IGE QN: 0.11 KU/L
PECAN/HICK TREE IGE QN: 0.1 KU/L
RAST CLASS: ABNORMAL
RAST CLASS: NORMAL
TIMOTHY IGE QN: 0.62 KU/L
WEST RAGWEED IGE QN: 0.12 KU/L
WHITE OAK IGE QN: <0.1 KU/L

## 2024-09-03 ENCOUNTER — PATIENT MESSAGE (OUTPATIENT)
Dept: OTOLARYNGOLOGY | Facility: CLINIC | Age: 29
End: 2024-09-03
Payer: COMMERCIAL

## 2024-09-20 ENCOUNTER — PATIENT MESSAGE (OUTPATIENT)
Dept: OTOLARYNGOLOGY | Facility: CLINIC | Age: 29
End: 2024-09-20
Payer: COMMERCIAL

## 2024-10-01 ENCOUNTER — OFFICE VISIT (OUTPATIENT)
Dept: OTOLARYNGOLOGY | Facility: CLINIC | Age: 29
End: 2024-10-01
Payer: COMMERCIAL

## 2024-10-01 VITALS
SYSTOLIC BLOOD PRESSURE: 123 MMHG | HEART RATE: 72 BPM | WEIGHT: 315 LBS | DIASTOLIC BLOOD PRESSURE: 83 MMHG | BODY MASS INDEX: 44.04 KG/M2

## 2024-10-01 DIAGNOSIS — J34.3 NASAL TURBINATE HYPERTROPHY: ICD-10-CM

## 2024-10-01 DIAGNOSIS — J34.2 NASAL SEPTAL DEVIATION: Primary | ICD-10-CM

## 2024-10-01 DIAGNOSIS — J30.9 ALLERGIC RHINITIS, UNSPECIFIED SEASONALITY, UNSPECIFIED TRIGGER: ICD-10-CM

## 2024-10-01 DIAGNOSIS — J34.3 HYPERTROPHY OF INFERIOR NASAL TURBINATE: ICD-10-CM

## 2024-10-01 DIAGNOSIS — J31.0 CHRONIC RHINITIS: ICD-10-CM

## 2024-10-01 DIAGNOSIS — Z01.818 PRE-OP TESTING: ICD-10-CM

## 2024-10-01 DIAGNOSIS — J34.89 NASAL OBSTRUCTION: ICD-10-CM

## 2024-10-01 PROCEDURE — 3079F DIAST BP 80-89 MM HG: CPT | Mod: CPTII,S$GLB,, | Performed by: OTOLARYNGOLOGY

## 2024-10-01 PROCEDURE — 3074F SYST BP LT 130 MM HG: CPT | Mod: CPTII,S$GLB,, | Performed by: OTOLARYNGOLOGY

## 2024-10-01 PROCEDURE — 99999 PR PBB SHADOW E&M-EST. PATIENT-LVL III: CPT | Mod: PBBFAC,,, | Performed by: OTOLARYNGOLOGY

## 2024-10-01 PROCEDURE — 1159F MED LIST DOCD IN RCRD: CPT | Mod: CPTII,S$GLB,, | Performed by: OTOLARYNGOLOGY

## 2024-10-01 PROCEDURE — 1160F RVW MEDS BY RX/DR IN RCRD: CPT | Mod: CPTII,S$GLB,, | Performed by: OTOLARYNGOLOGY

## 2024-10-01 PROCEDURE — 99214 OFFICE O/P EST MOD 30 MIN: CPT | Mod: S$GLB,,, | Performed by: OTOLARYNGOLOGY

## 2024-10-01 PROCEDURE — 3008F BODY MASS INDEX DOCD: CPT | Mod: CPTII,S$GLB,, | Performed by: OTOLARYNGOLOGY

## 2024-10-01 NOTE — PROGRESS NOTES
Mr. Jeff     Vitals:    10/01/24 1034   BP: 123/83   Pulse: 72       Chief Complaint:  Other Misc (Nasal obstruction )       HPI:   presents referred by RACH Narvaez for nasal obstruction.  He has had nasal congestion and obstruction he states since he was in grade school.  He has been on Flonase for many years which was helping him only minimally.  He is now on Astepro and Zyrtec which is helping him more.  He is also using saline sinus rinses daily.  He denies any history of chronic recurrent sinus infections but does have a diagnosis of MYRIAM and is on CPAP.    SNOT22- 67 NOSE- 100    Review of Systems:  Constitutional:   weight loss or weight gain: Negative  Allergy/Immunologic:   Positive  Nasal Congestion/Obstruction:   Positive as above  Nosebleeds:   Negative  Sinus infections:   Negative  Headache/Facial Pain:   Negative  Snoring/MYRIAM:   Positive as above  Throat: Infections/Pain:   Negative  Hoarseness/Speech Disturbance:   Negative  Trauma Hx:  Negative    Cardiovascular:  M/I Angina: Negative  Hypertension: Negative  Endocrine:    DM/Steroids: Negative  GI:   Dysphagia/Reflux: Negative  :   GYN Pregnancy: Negative  Renal:   Dialysis: Negative  Lymphatic:   Neck Mass/Lymphadenopathy: Negative  Muscoloskeletal:   Negative  Hematologic:   Bleeding Disorders/Anemia: Negative  Neurologic:    Cranial/Neuralgia: Negative  Pulmonary:   Asthma/SOB/Cough: Negative  Skin Disorders: Negative    Past Medical/Surgical/Family/Social History:    ENT Surgery: Negative  Occupational Exposure: Negative   Problems: Negative  Cancer: Negative    Past Family History:   Family history of Cancer: Negative    Past Social History:   Tobacco:  Former smoker who quit years ago   Alcohol:  1-2 drinks once or twice monthly Drinker      Allergies and medications: Reviewed per med card.    Physical Examination:  Ears:   External auditory canals:  Clear   Hearing: Grossly intact   Tympanic Membranes:  Clear  Nose:   External: Normal with good valve support   Intranasal:  Anterior septal deviation to the right with 2 to 3+ turbinates which do decongest relatively well.  This deformity creates 85% obstruction.  Mouth:   Intraorally: Lips, teeth, and gums: Normal   Oropharynx: Normal   Mucosa: Normal   Tongue: Normal  Throat:      Palate:  Hypertrophic palate but normal uvula with normal elevation, Mallampati 1-2   Tonsils:  Minimal bilaterally   Posterior Pharynx: Normal  Fiberoptic exam: Not performed  Head/Face:     Inspection: Normal and atraumatic   Palpation/Percussion: Non tender   Facial strength: Normal and symmetric   Salivary glands: Normal  Neck: Supple  Thyroid: No masses  Lymphatics: No nodes  Respiratory:   Effort: Normal  Eyes:   Ocular Mobility: Normal   Vision: Grossly intact  Neuro/Psych:   Cranial Nerves: Grossly Intact   Orientation: Normal   Mood/Affect: Normal      Assessment/Plan:  I have discussed my findings with him in detail as well as my recommendations for treatment.  I have encouraged him to continue with his saline sinus rinses utilizing distilled water only as well as his Astepro and Zyrtec which appears to be helping him.  I will place a referral for an allergy consultation for him.  Surgically we discussed that he could benefit from septoplasty and submucous resection of turbinates.  I have discussed the pros and cons risks and benefits as well as technical aspects of this procedure in detail with him.  He understands and accepts this and will consider this information and let us know of his decision.

## 2024-10-08 ENCOUNTER — PATIENT MESSAGE (OUTPATIENT)
Dept: OTOLARYNGOLOGY | Facility: CLINIC | Age: 29
End: 2024-10-08
Payer: COMMERCIAL

## 2024-10-10 ENCOUNTER — OFFICE VISIT (OUTPATIENT)
Dept: ALLERGY | Facility: CLINIC | Age: 29
End: 2024-10-10
Payer: COMMERCIAL

## 2024-10-10 ENCOUNTER — PATIENT MESSAGE (OUTPATIENT)
Dept: ALLERGY | Facility: CLINIC | Age: 29
End: 2024-10-10

## 2024-10-10 VITALS
OXYGEN SATURATION: 98 % | SYSTOLIC BLOOD PRESSURE: 132 MMHG | WEIGHT: 315 LBS | HEART RATE: 90 BPM | HEIGHT: 73 IN | BODY MASS INDEX: 41.75 KG/M2 | DIASTOLIC BLOOD PRESSURE: 81 MMHG

## 2024-10-10 DIAGNOSIS — J30.81 ALLERGIC RHINITIS DUE TO DOGS: ICD-10-CM

## 2024-10-10 PROCEDURE — 1159F MED LIST DOCD IN RCRD: CPT | Mod: CPTII,S$GLB,, | Performed by: ALLERGY & IMMUNOLOGY

## 2024-10-10 PROCEDURE — 99999 PR PBB SHADOW E&M-EST. PATIENT-LVL III: CPT | Mod: PBBFAC,,, | Performed by: ALLERGY & IMMUNOLOGY

## 2024-10-10 PROCEDURE — 99204 OFFICE O/P NEW MOD 45 MIN: CPT | Mod: S$GLB,,, | Performed by: ALLERGY & IMMUNOLOGY

## 2024-10-10 PROCEDURE — 3008F BODY MASS INDEX DOCD: CPT | Mod: CPTII,S$GLB,, | Performed by: ALLERGY & IMMUNOLOGY

## 2024-10-10 PROCEDURE — 3075F SYST BP GE 130 - 139MM HG: CPT | Mod: CPTII,S$GLB,, | Performed by: ALLERGY & IMMUNOLOGY

## 2024-10-10 PROCEDURE — 3079F DIAST BP 80-89 MM HG: CPT | Mod: CPTII,S$GLB,, | Performed by: ALLERGY & IMMUNOLOGY

## 2024-10-10 RX ORDER — FLUTICASONE PROPIONATE 50 MCG
2 SPRAY, SUSPENSION (ML) NASAL 2 TIMES DAILY
Qty: 16 G | Refills: 11 | Status: SHIPPED | OUTPATIENT
Start: 2024-10-10

## 2024-10-10 NOTE — PROGRESS NOTES
Subjective:       Patient ID: James Jeff is a 28 y.o. male.    Chief Complaint:  Sinus Problem, Nasal Congestion, Allergy Testing, Allergic Rhinitis , Allergic Reaction, Allergies, and Sinusitis      HPI:   Pt w hx allergic rhinitis to dog presents for evaluation. Has questions about immunotherpy. Patient's symptoms include itchy eyes, nasal congestion, postnasal drip, sinus pressure, sneezing, and watery eyes. Congestion is most prominent. These symptoms are perennial. Current triggers include exposure to  dog . The patient has been suffering from these symptoms for approximately 5 years. Has been around dogs all his life but sx's just started 5 yrs ago.   The patient has tried  flonase 1 sen up to 3 times daily and 20 mg zyrtec with parital/inadequate relief of symptoms. Astelin did not seem helpful. Immunotherapy has never been tried. He is interested b/c he will likely always be around dogs. Environmental measures to minimize dog dander exposure are only partially helpful.   The patient has never had nasal polyps. The patient has no history of asthma. The patient has no history of eczema. The patient does not suffer from frequent sinopulmonary infections. The patient has not had sinus surgery in the past, though has turbinectomy, repair of dev nasal septum scheduled for next month.        Environmental History: Pets in the home: dogs (1).  Air purifiers    Past Medical History:   Diagnosis Date    GERD (gastroesophageal reflux disease)     Psoriasis          Family History   Problem Relation Name Age of Onset    Miscarriages / Stillbirths Mother      Hypertension Mother      Valvular heart disease Mother          on Coumadin    Stroke Mother      Diabetes Mother      Obesity Mother      Alcohol abuse Father      Alcohol abuse Brother           Review of Systems   Constitutional:  Negative for activity change, fatigue and fever.   HENT:  Positive for congestion, postnasal drip and sinus pressure.  Negative for rhinorrhea and sneezing.    Eyes:  Negative for discharge, redness and itching.   Respiratory:  Negative for cough, shortness of breath and wheezing.    Cardiovascular:  Negative for chest pain.   Gastrointestinal:  Negative for constipation, diarrhea, nausea and vomiting.   Genitourinary:  Negative for difficulty urinating.   Musculoskeletal:  Negative for joint swelling and myalgias.   Skin:  Negative for rash.   Neurological:  Negative for headaches.   Hematological:  Does not bruise/bleed easily.   Psychiatric/Behavioral:  Negative for behavioral problems and sleep disturbance.           Objective:   Physical Exam  Constitutional:       General: He is not in acute distress.     Appearance: He is well-developed. He is not diaphoretic.   HENT:      Head: Normocephalic and atraumatic.      Right Ear: Tympanic membrane and external ear normal.      Left Ear: Tympanic membrane and external ear normal.      Nose: Congestion present.      Mouth/Throat:      Pharynx: No oropharyngeal exudate.   Eyes:      General:         Right eye: No discharge.         Left eye: No discharge.      Conjunctiva/sclera: Conjunctivae normal.   Neck:      Thyroid: No thyromegaly.   Cardiovascular:      Rate and Rhythm: Normal rate and regular rhythm.   Pulmonary:      Effort: Pulmonary effort is normal. No respiratory distress.      Breath sounds: Normal breath sounds. No wheezing.   Abdominal:      General: Bowel sounds are normal. There is no distension.      Palpations: Abdomen is soft.      Tenderness: There is no abdominal tenderness.   Musculoskeletal:         General: Normal range of motion.      Cervical back: Normal range of motion and neck supple.   Lymphadenopathy:      Cervical: No cervical adenopathy.   Skin:     General: Skin is warm.      Findings: No erythema or rash.   Neurological:      Mental Status: He is alert and oriented to person, place, and time.      Motor: No abnormal muscle tone.   Psychiatric:     "     Behavior: Behavior normal.         Thought Content: Thought content normal.         Judgment: Judgment normal.             No results found for: "IGGSERUM", "IGM", "IGA"     Total IgE   Date Value Ref Range Status   08/27/2024 <35 0 - 100 IU/mL Final       Eos #   Date Value Ref Range Status   04/29/2024 0.4 0.0 - 0.5 K/uL Final   01/20/2024 0.2 0.0 - 0.5 K/uL Final   05/03/2023 0.3 0.0 - 0.5 K/uL Final     Eosinophil %   Date Value Ref Range Status   04/29/2024 4.4 0.0 - 8.0 % Final   01/20/2024 2.1 0.0 - 8.0 % Final   05/03/2023 2.9 0.0 - 8.0 % Final     Total IgE   Date Value Ref Range Status   08/27/2024 <35 0 - 100 IU/mL Final        Latest Reference Range & Units 08/27/24 14:21   A. alternata IgE <0.10 kU/L <0.10   Altern. alternata Class  CLASS 0   Aspergillus Fumigatus IgE <0.10 kU/L <0.10   A. fumigatus Class  CLASS 0   Bermuda Grass IgE <0.10 kU/L 0.38 (H)   Bermuda Grass Class  CLASS 1   Cat Dander IgE <0.10 kU/L <0.10   Cat Epithelium Class  CLASS 0   [place]on IgE <0.10 kU/L <0.10   [place]on Class  CLASS 0   D. farinae <0.10 kU/L <0.10   D. farinae Class  CLASS 0   D. pteronyssinus Dust Mite IgE <0.10 kU/L <0.10   D. pteronyssinus Class  CLASS 0   Dog Dander IgE <0.10 kU/L 4.56 (H)   Dog Dander Class  CLASS 3   English Plantain IgE <0.10 kU/L 0.11 (H)   English Plantain Class  CLASS 0/1   Marshelder IgE <0.10 kU/L <0.10   Marshelder Class  CLASS 0   Oak, Class  CLASS 0   Pecan Littcarr Tree IgE <0.10 kU/L 0.10   Pecan, Class  CLASS 0/1   Ragweed, Western IgE <0.10 kU/L 0.12 (H)   Ragweed, Western, Class  CLASS 0/1   Ryan Grass IgE <0.10 kU/L 0.62 (H)   Ryan Grass Class  CLASS 1   [place] Tree IgE <0.10 kU/L <0.10   (H): Data is abnormally high    Assessment:       1. Allergic rhinitis due to dogs         Plan:       James Terry" was seen today for sinus problem, nasal congestion, allergy testing, allergic rhinitis , allergic reaction, allergies and sinusitis.    Diagnoses and all orders for " this visit:    Allergic rhinitis due to dogs    -     fluticasone propionate (FLONASE) 50 mcg/actuation nasal spray; 2 sprays (100 mcg total) by Each Nostril route 2 (two) times a day.     -with astelin 2 sen twice daily    Refer to Dr. Bernal to discuss immunotherapy. May be interested in [facility]. Copy of SCIT consent given. Return when starts SCIT or send back to us.         Total of 45 minutes on encounter the day of the visit. This includes face to face time and non-face to face time preparing to see the patient (eg, review of tests), obtaining and/or reviewing separately obtained history, documenting clinical information in the electronic or other health record, independently interpreting results and communicating results to the patient/family/caregiver, or care coordinator.

## 2024-10-18 ENCOUNTER — OFFICE VISIT (OUTPATIENT)
Dept: OTOLARYNGOLOGY | Facility: CLINIC | Age: 29
End: 2024-10-18
Payer: COMMERCIAL

## 2024-10-18 VITALS
WEIGHT: 315 LBS | SYSTOLIC BLOOD PRESSURE: 142 MMHG | DIASTOLIC BLOOD PRESSURE: 83 MMHG | BODY MASS INDEX: 44.53 KG/M2 | HEART RATE: 78 BPM

## 2024-10-18 DIAGNOSIS — J34.3 HYPERTROPHY OF INFERIOR NASAL TURBINATE: ICD-10-CM

## 2024-10-18 DIAGNOSIS — J31.0 CHRONIC RHINITIS: ICD-10-CM

## 2024-10-18 DIAGNOSIS — H69.91 ETD (EUSTACHIAN TUBE DYSFUNCTION), RIGHT: Primary | ICD-10-CM

## 2024-10-18 PROCEDURE — 99999 PR PBB SHADOW E&M-EST. PATIENT-LVL III: CPT | Mod: PBBFAC,,, | Performed by: PHYSICIAN ASSISTANT

## 2024-10-18 RX ORDER — PREDNISONE 20 MG/1
20 TABLET ORAL DAILY
Qty: 5 TABLET | Refills: 0 | Status: SHIPPED | OUTPATIENT
Start: 2024-10-18 | End: 2024-10-23

## 2024-10-18 NOTE — LETTER
October 18, 2024      [facility]  [address]  [place] [address]  Phone: [phone]       Patient: James Jeff   YOB: 1995  Date of Visit: 10/18/2024    To Whom It May Concern:    Shahriar Jeff  was at [facility] on 10/18/2024. The patient may return to work/school on 10/19/24 with no restrictions. If you have any questions or concerns, or if I can be of further assistance, please do not hesitate to contact me.    Sincerely,      [place] LOYD

## 2024-10-18 NOTE — PATIENT INSTRUCTIONS
Eustachian Tube Dysfunction  The eustachian tube is behind the eardrum. It connects the middle ear to the back of the throat. The tube is usually closed. But it opens periodically to help make the air pressure in the middle ear the same as outside the ear. The tube also drains mucus made in the middle ear.  A blocked tube is called a eustachian tube obstruction.  Eustachian tube dysfunction (ETD) is the failure of the Eustachian tube (a passage that leads to your middle ear) to open and/or close properly.    A eustachian tube that is blocked causes pressure, pain, and loss of hearing. Sounds may be muffled. The ear may feel full.  An adult may complain of ear pain, ear fullness/ pressure in the ear, dizziness, or even trouble hearing.  The blockage often goes away on its own without treatment. In other cases, treatments may be given to help reduce swelling within the tube. These may include a nasal decongestant, antihistamine, nasal spray (prescription steroid or over-the-counter saline), or allergy treatment.  A blocked eustachian tube is usually a short-term problem.   Home care  Nasal Steroid Spray such as Flonase.  2 sprays in each nostril once daily.   Use an antihistamine of your choice such as Zyrtec, Allegra, or Claritin during the day for allergy relief or you may use Xyzal at night (it may make you sleepy)  Try swallow, yawn or 'pop' your ears to open the tubes and equalize pressure 1-2 times daily.  Do NOT do this if there is any pain or discomfort.     FLONASE INSTRUCTIONS    You have been prescribed or instructed to take a nasal steroid spray.  Examples of this medication include Flonase (fluticasone), Nasacort (triamcinolone), and Rhinocort (budesonide). Some symptoms will experience relief within a few days; however, it may take 2-3 weeks to begin to see improvement. This medication needs to be taken consistently to see results- be patient :)    Helpful hints for maximizing medication into the  nose  - Use the opposite hand to spray the nostril (example: right hand for left nostril). This will help avoid spraying the medication onto the septum (the area that divides the left and right nasal cavity.)  - Tilt the bottle so that it is facing at a slight angle up or straight back, but avoid pointing the bottle straight up while spraying.   - Gently sniff (do not snort) a few seconds after spraying.

## 2024-10-18 NOTE — PROGRESS NOTES
Subjective:      James is a 28 y.o. male who comes for acute evaluation of ear pain.  His last visit with me was on 8/27/2024.      Patient reports developing a URI last day for which he took DayQuil NyQuil for a couple of days.  He reports developing moderate right-sided ear pain/pressure about 2-3 days ago.  He denies any muffled hearing, ear drainage.  Pain not worse with chewing or swallowing.  He does report a history of tubes as a child in both ears.  Symptoms may have worsened after recent use of saline rinse but unclear.    Per last office visit 8/27/2024 :  Patient reports bilateral nasal obstruction for as long as he can remember.  Patient has been on Flonase since he was a child without significant improvement in symptoms.  Patient denies any hyposmia or facial pressure.  Symptoms are worse at night and causes him issues with tolerating his CPAP mask.  He has been using Zicam last 2 weeks without any significant improvement in symptoms.  Recently started air purifier at home which seemed to help with his nasal obstruction.  He reports episodes of sneezing and rhinorrhea at certain times when he is home.  He reports carpet in his apartment currently and a history of an allergic rhinitis to his parents' dog.     Current sinonasal medications include flonase, zicam nasal spray.    He does not recall previously having allergy testing.     He relates a diagnosis of obstructive sleep apnea with regular CPAP use.   He recalls a prior history of nasal trauma but no memories of severe pain or broken nose  He has not had sinonasal surgery.    He has not had a tonsillectomy.  He is not a tobacco smoker.     1. Nasal obstruction  2. Chronic rhinitis  3. Hypertrophy of inferior nasal turbinate  -    nasal endoscopy without polyps, purulence, or masses  -     checking inhalant immuno caps  -     Prescribed patient to START azelastine and also educated patient on how to properly use nasal sprays  -      azelastine (ASTELIN) 137 mcg (0.1 %) nasal spray; 1 spray (137 mcg total) by Nasal route 2 (two) times daily.  Dispense: 30 mL; Refill: 11  -Discussed surgical intervention if above treatment does not resolve symptoms.  -Continue with air purifier     4. Intolerance of continuous positive airway pressure (CPAP) ventilation  5. MYRIAM on CPAP              - discussed surgical intervention should help with nasal obstruction and therefore CPAP tolerance    The patient's medications, allergies, past medical, surgical, social and family histories were reviewed and updated as appropriate.    A detailed review of systems was obtained with pertinent positives as per the above HPI, and otherwise negative.        Objective:     BP (!) 142/83 (BP Location: Right arm, Patient Position: Sitting)   Pulse 78   Wt (!) 153.1 kg (337 lb 8.4 oz)   BMI 44.53 kg/m²      Physical Exam  Vitals reviewed.   Constitutional:       Appearance: Normal appearance.   HENT:      Head: Normocephalic and atraumatic.      Jaw: No trismus, tenderness or pain on movement.      Right Ear: Ear canal and external ear normal. Tympanic membrane is scarred.      Left Ear: Ear canal and external ear normal. Tympanic membrane is scarred.      Ears:      Comments: Some xerotic tissue in EACs but no concerning findings  No tenderness with manipulation of pinna  Eyes:      Conjunctiva/sclera: Conjunctivae normal.   Pulmonary:      Effort: Pulmonary effort is normal.   Neurological:      Mental Status: He is alert.          Procedure    None    Data Reviewed    WBC (K/uL)   Date Value   04/29/2024 9.75     Eosinophil % (%)   Date Value   04/29/2024 4.4     Eos # (K/uL)   Date Value   04/29/2024 0.4     Platelets (K/uL)   Date Value   04/29/2024 370     Glucose (mg/dL)   Date Value   04/29/2024 100     Total IgE (IU/mL)   Date Value   08/27/2024 <35       Assessment:     1. ETD (Eustachian tube dysfunction), right    2. Chronic rhinitis    3. Hypertrophy of  inferior nasal turbinate         Plan:     - Otoscopic exam benign- no cerumen, infection or fluid noted.    - Discussed the anatomy and function of the eustachian tube including aerating and draining the middle ear.    - Common symptoms include: muffled/reduced hearing, plugged feeling, ear clicking/ popping, or ear pain.    - CONTINUE Flonase 2 SEN daily and gentle auto-insufflation 1-2 times daily.    - Rx low dose prednisone  - if not improved in 2-3 weeks will have patient evaluated by ear specialists with access to microscopy     He will follow up as needed  I had a discussion with the patient regarding his condition and the further workup and management options.    All questions were answered, and the patient is in agreement with the above.     Disclaimer:  This note may have been prepared utilizing voice recognition software which may result in occasional typographical errors in the text such as sound alike words.   If further clarification is needed, please contact the ENT department of [facility].

## 2024-10-24 ENCOUNTER — PATIENT MESSAGE (OUTPATIENT)
Dept: OTOLARYNGOLOGY | Facility: CLINIC | Age: 29
End: 2024-10-24
Payer: COMMERCIAL

## 2024-10-24 ENCOUNTER — TELEPHONE (OUTPATIENT)
Dept: OTOLARYNGOLOGY | Facility: CLINIC | Age: 29
End: 2024-10-24
Payer: COMMERCIAL

## 2024-10-25 ENCOUNTER — OFFICE VISIT (OUTPATIENT)
Dept: OTOLARYNGOLOGY | Facility: CLINIC | Age: 29
End: 2024-10-25
Payer: COMMERCIAL

## 2024-10-25 VITALS
HEART RATE: 78 BPM | SYSTOLIC BLOOD PRESSURE: 175 MMHG | DIASTOLIC BLOOD PRESSURE: 95 MMHG | HEIGHT: 73 IN | WEIGHT: 315 LBS | BODY MASS INDEX: 41.75 KG/M2

## 2024-10-25 DIAGNOSIS — J01.90 ACUTE NON-RECURRENT SINUSITIS, UNSPECIFIED LOCATION: ICD-10-CM

## 2024-10-25 DIAGNOSIS — H74.41 POLYP OF RIGHT MIDDLE EAR: Primary | ICD-10-CM

## 2024-10-25 DIAGNOSIS — G47.33 OSA (OBSTRUCTIVE SLEEP APNEA): ICD-10-CM

## 2024-10-25 RX ORDER — CIPROFLOXACIN AND DEXAMETHASONE 3; 1 MG/ML; MG/ML
4 SUSPENSION/ DROPS AURICULAR (OTIC) 2 TIMES DAILY
Qty: 7.5 ML | Refills: 0 | Status: SHIPPED | OUTPATIENT
Start: 2024-10-25

## 2024-10-25 RX ORDER — AMOXICILLIN AND CLAVULANATE POTASSIUM 875; 125 MG/1; MG/1
1 TABLET, FILM COATED ORAL EVERY 12 HOURS
Qty: 20 TABLET | Refills: 0 | Status: SHIPPED | OUTPATIENT
Start: 2024-10-25

## 2024-10-25 NOTE — PROGRESS NOTES
Ear, Nose, & Throat  Otolaryngology - Head & Neck Surgery      Subjective:     Chief Complaint:   Chief Complaint   Patient presents with    Ear Fullness       James Jeff is a 28 y.o. male who was self-referred for otalgia.    Patient reports today after previously Solange LOYD and Dr. Orourke.  He of progressively worsening right-sided otalgia and thin brown otorrhea.  He has a history of tubes as a kid.  No other significant otologic history.  He was being treated for Eustachian tube dysfunction with oral steroids and Flonase following his last visit, but he noted no improvement.  He stated that he had decided to come to the doctor today after not being able to sleep for the past 2 nights due to the pain.    He feels that he always has issues clearing his ears.  He feels like his right ear is the worst year even prior to this most recent infection.    He has a history of MYRIAM and CPAP non adherence.  Plan for septoplasty and turbinate reduction with Dr. Orourke on 11/11/2024.  He has congenital midface hypoplasia.      Past Medical History  Active Ambulatory Problems     Diagnosis Date Noted    Psoriasis 04/27/2023    Sleep disturbance 08/03/2023    MYRIAM (obstructive sleep apnea) 11/29/2023    Morbid obesity with BMI of 40.0-44.9, adult 05/15/2024    ADHD (attention deficit hyperactivity disorder) 07/30/2012    Allergy 07/30/2012     Resolved Ambulatory Problems     Diagnosis Date Noted    Class 2 obesity due to excess calories without serious comorbidity with body mass index (BMI) of 39.0 to 39.9 in adult 04/27/2023     Past Medical History:   Diagnosis Date    GERD (gastroesophageal reflux disease)        Past Surgical History  He has a past surgical history that includes Hand surgery (2009).    Past Surgical History:   Procedure Laterality Date    HAND SURGERY  2009        Family History  His family history includes Alcohol abuse in his brother and father; Diabetes in his mother; Hypertension in his  "mother; Miscarriages / Stillbirths in his mother; Obesity in his mother; Stroke in his mother; Valvular heart disease in his mother.    Social History  He reports that he quit smoking about 2 years ago. His smoking use included cigarettes and vaping with nicotine. He started smoking about 14 years ago. He has been exposed to tobacco smoke. He has never used smokeless tobacco. He reports current alcohol use of about 2.0 standard drinks of alcohol per week. He reports that he does not currently use drugs.    Allergies  He is allergic to dog dander.    Medications  He has a current medication list which includes the following prescription(s): azelastine, fluticasone propionate, gabapentin, tadalafil, amoxicillin-clavulanate 875-125mg, and ciprofloxacin-dexamethasone 0.3-0.1%.    ROS:  Pertinent positive and negative review of systems as noted in HPI.     Objective:     BP (!) 175/95 (BP Location: Left arm, Patient Position: Sitting)   Pulse 78   Ht 6' 1" (1.854 m)   Wt (!) 153.1 kg (337 lb 8.4 oz)   BMI 44.53 kg/m²    Physical Exam  Constitutional: Well appearing, communicating. No acute distress  Voice: Euphonic  Head/Face:   House Brackmann I Bilaterally  Parotid glands not enlarged  No masses on palption  Nose:    Septum Deviated Left    moderate edematous and erythematous turbinate hypertrophy   purulent rhinorrhea present   Piriform aperture stenosis  Oral Cavity   Dentition: good    Type 3 occlusion, cross bite   No masses or lesions observed   No trismus   No tongue atrophy, symmetric protrusion  Oropharynx:   Tonsils: Symmetric, absent, .   Mireles Tongue Position: 4    No pharyngeal erythema   Symmetric Palate elevation   BOT palpation deferred  Neck   Submandibular glands not enlarged and symmetric   Cervical lymph nodes not palpable   Thyroid gland not enlarged and symmetric   Trachea midline   Laryngeal landmarks palpable  Neuro/Psychiatric:     Affect: Appropriate  Respiratory:   No increased WOB  No " stridor     Data Review:   LABS  Hemoglobin (g/dL)   Date Value   04/29/2024 15.0     WBC (K/uL)   Date Value   04/29/2024 9.75     Platelets (K/uL)   Date Value   04/29/2024 370     Creatinine (mg/dL)   Date Value   04/29/2024 0.9     Calcium (mg/dL)   Date Value   04/29/2024 9.5    (HGB:1,HCT:1,WBC:1,PLT:1,NA:1,K:1,CREATININE:1,BUN:1,GLU:1,POCTGLUCOSE:1,CALCIUM:1, A1C:1)@,   WBC (K/uL)   Date Value   04/29/2024 9.75     Eos # (K/uL)   Date Value   04/29/2024 0.4     Platelets (K/uL)   Date Value   04/29/2024 370     Total IgE (IU/mL)   Date Value   08/27/2024 <35     Hemoglobin A1C (%)   Date Value   07/21/2022 5.4        I have independently reviewed the lab results shown above. Findings significant for the conditions being treated include:  Normal    IMAGING    No pertinent imaging available    AUDIO    not performed    Procedures:   Procedure: Ear Microscopy [id number]    Pre procedure Diagnosis:  Aural polyp    Post procedure Diagnosis: same    Instrument: Binocular Microscope    Anesthesia: None    Procedure: After verbal consent was obtained, the patient was laid supine in the small procedure room. A microscope was used to examine the ear. Instrumentation and suction were used to remove any cerumen, debris or blood from the EAC. The patient tolerated the procedure well and without any acute complication. Findings below.     Findings:    Right Ear:    EAC: edematous and erythematous.  No suppurative otorrhea.  Aural polyp and posterior canal at BC junction   Tympanic membrane: intact.  View obstructed by canal   Middle Ear:  Poor examination of middle ear due to above-mentioned abnormalities  Left Ear:    EAC: normal   Tympanic membrane: intact and thickened   Middle Ear: No effusion present and [place] in normal position    See documented assessment and plan for impression.       Assessment:     1. Polyp of right middle ear    2. Acute non-recurrent sinusitis, unspecified location    3. MYRIAM (obstructive sleep  apnea)        Plan:     I had a long discussion with the patient regarding his condition and the further workup and management options.  As I discussed with him he has an aural polyp at the bony cartilaginous junction.  I discussed with him that this is most commonly associated with chronic inflammation.  I have prescribed him Ciprodex drops.  Also discussed that I will get a CT of the temporal bone to better assess.    He also has evidence of acute sinusitis.  We will treat with Augmentin.  He will reach out to me on the patient portal after completing his antibiotics or sooner if needed.   .  MYRIAM with CPAP non adherence.  Plan for septoplasty and inferior turbinate reduction for nasal obstruction and hopefully CPAP adherence.  Patient has midface hypoplasia and a poor palatal orientation for soft tissue surgery.  Would likely need orthognathic surgery for best chance of success.    Orders Placed This Encounter   Procedures    CT Temporal Bone without contrast      Medications Ordered This Encounter   Medications    amoxicillin-clavulanate 875-125mg (AUGMENTIN) 875-125 mg per tablet    ciprofloxacin-dexAMETHasone 0.3-0.1% (CIPRODEX) 0.3-0.1 % DrpS      Problem List Items Addressed This Visit          Other    MYRIAM (obstructive sleep apnea)     Other Visit Diagnoses       Polyp of right middle ear    -  Primary    Relevant Orders    CT Temporal Bone without contrast    Acute non-recurrent sinusitis, unspecified location

## 2024-10-28 ENCOUNTER — HOSPITAL ENCOUNTER (OUTPATIENT)
Dept: RADIOLOGY | Facility: OTHER | Age: 29
Discharge: HOME | End: 2024-10-28
Attending: STUDENT IN AN ORGANIZED HEALTH CARE EDUCATION/TRAINING PROGRAM
Payer: COMMERCIAL

## 2024-10-28 DIAGNOSIS — H74.41 POLYP OF RIGHT MIDDLE EAR: ICD-10-CM

## 2024-10-28 PROCEDURE — 70480 CT ORBIT/EAR/FOSSA W/O DYE: CPT | Mod: 26,,, | Performed by: RADIOLOGY

## 2024-10-28 PROCEDURE — 70480 CT ORBIT/EAR/FOSSA W/O DYE: CPT | Mod: TC

## 2024-10-30 NOTE — PRE-PROCEDURE INSTRUCTIONS
Patient reviewed- scheduled patient pre op visit for MYRIAM and BMI of 44. Patient confirmed appointment on 11/7 at 1430.

## 2024-11-01 ENCOUNTER — TELEPHONE (OUTPATIENT)
Dept: OTOLARYNGOLOGY | Facility: CLINIC | Age: 29
End: 2024-11-01
Payer: COMMERCIAL

## 2024-11-02 DIAGNOSIS — J31.0 CHRONIC RHINITIS: ICD-10-CM

## 2024-11-04 RX ORDER — AZELASTINE 1 MG/ML
1 SPRAY, METERED NASAL 2 TIMES DAILY
Qty: 30 ML | Refills: 11 | Status: SHIPPED | OUTPATIENT
Start: 2024-11-04 | End: 2025-11-04

## 2024-11-05 ENCOUNTER — PATIENT MESSAGE (OUTPATIENT)
Dept: OTOLARYNGOLOGY | Facility: CLINIC | Age: 29
End: 2024-11-05
Payer: COMMERCIAL

## 2024-11-05 ENCOUNTER — TELEPHONE (OUTPATIENT)
Dept: OTOLARYNGOLOGY | Facility: CLINIC | Age: 29
End: 2024-11-05
Payer: COMMERCIAL

## 2024-11-06 ENCOUNTER — TELEPHONE (OUTPATIENT)
Dept: PREADMISSION TESTING | Facility: HOSPITAL | Age: 29
End: 2024-11-06
Payer: COMMERCIAL

## 2024-11-07 ENCOUNTER — LAB VISIT (OUTPATIENT)
Dept: LAB | Facility: HOSPITAL | Age: 29
End: 2024-11-07
Attending: OTOLARYNGOLOGY
Payer: COMMERCIAL

## 2024-11-07 ENCOUNTER — OFFICE VISIT (OUTPATIENT)
Dept: INTERNAL MEDICINE | Facility: CLINIC | Age: 29
End: 2024-11-07
Payer: COMMERCIAL

## 2024-11-07 VITALS
HEIGHT: 74 IN | WEIGHT: 315 LBS | TEMPERATURE: 99 F | HEART RATE: 98 BPM | OXYGEN SATURATION: 97 % | BODY MASS INDEX: 40.43 KG/M2 | SYSTOLIC BLOOD PRESSURE: 140 MMHG | DIASTOLIC BLOOD PRESSURE: 78 MMHG

## 2024-11-07 DIAGNOSIS — K76.0 FATTY LIVER: ICD-10-CM

## 2024-11-07 DIAGNOSIS — E66.01 MORBID OBESITY WITH BMI OF 40.0-44.9, ADULT: ICD-10-CM

## 2024-11-07 DIAGNOSIS — Z01.818 PREOPERATIVE EXAMINATION: Primary | ICD-10-CM

## 2024-11-07 DIAGNOSIS — J31.0 CHRONIC RHINITIS: ICD-10-CM

## 2024-11-07 DIAGNOSIS — J34.3 NASAL TURBINATE HYPERTROPHY: ICD-10-CM

## 2024-11-07 DIAGNOSIS — G47.33 OSA (OBSTRUCTIVE SLEEP APNEA): ICD-10-CM

## 2024-11-07 DIAGNOSIS — R03.0 ELEVATED BP WITHOUT DIAGNOSIS OF HYPERTENSION: ICD-10-CM

## 2024-11-07 DIAGNOSIS — J34.3 HYPERTROPHY OF INFERIOR NASAL TURBINATE: ICD-10-CM

## 2024-11-07 DIAGNOSIS — J34.89 NASAL OBSTRUCTION: ICD-10-CM

## 2024-11-07 DIAGNOSIS — F90.9 ATTENTION DEFICIT HYPERACTIVITY DISORDER (ADHD), UNSPECIFIED ADHD TYPE: ICD-10-CM

## 2024-11-07 DIAGNOSIS — J34.2 NASAL SEPTAL DEVIATION: ICD-10-CM

## 2024-11-07 DIAGNOSIS — Z01.818 PRE-OP TESTING: ICD-10-CM

## 2024-11-07 LAB
ALBUMIN SERPL BCP-MCNC: 4.3 G/DL (ref 3.5–5.2)
ALP SERPL-CCNC: 76 U/L (ref 40–150)
ALT SERPL W/O P-5'-P-CCNC: 52 U/L (ref 10–44)
ANION GAP SERPL CALC-SCNC: 10 MMOL/L (ref 8–16)
AST SERPL-CCNC: 30 U/L (ref 10–40)
BASOPHILS # BLD AUTO: 0.06 K/UL (ref 0–0.2)
BASOPHILS NFR BLD: 0.6 % (ref 0–1.9)
BILIRUB SERPL-MCNC: 0.7 MG/DL (ref 0.1–1)
BUN SERPL-MCNC: 23 MG/DL (ref 6–20)
CALCIUM SERPL-MCNC: 9.3 MG/DL (ref 8.7–10.5)
CHLORIDE SERPL-SCNC: 106 MMOL/L (ref 95–110)
CO2 SERPL-SCNC: 26 MMOL/L (ref 23–29)
CREAT SERPL-MCNC: 1 MG/DL (ref 0.5–1.4)
DIFFERENTIAL METHOD BLD: ABNORMAL
EOSINOPHIL # BLD AUTO: 0.5 K/UL (ref 0–0.5)
EOSINOPHIL NFR BLD: 4.7 % (ref 0–8)
ERYTHROCYTE [DISTWIDTH] IN BLOOD BY AUTOMATED COUNT: 12.6 % (ref 11.5–14.5)
EST. GFR  (NO RACE VARIABLE): >60 ML/MIN/1.73 M^2
GLUCOSE SERPL-MCNC: 98 MG/DL (ref 70–110)
HCT VFR BLD AUTO: 44.1 % (ref 40–54)
HGB BLD-MCNC: 14.7 G/DL (ref 14–18)
IMM GRANULOCYTES # BLD AUTO: 0.05 K/UL (ref 0–0.04)
IMM GRANULOCYTES NFR BLD AUTO: 0.5 % (ref 0–0.5)
INR PPP: 0.9 (ref 0.8–1.2)
LYMPHOCYTES # BLD AUTO: 3.5 K/UL (ref 1–4.8)
LYMPHOCYTES NFR BLD: 33.6 % (ref 18–48)
MCH RBC QN AUTO: 27.8 PG (ref 27–31)
MCHC RBC AUTO-ENTMCNC: 33.3 G/DL (ref 32–36)
MCV RBC AUTO: 84 FL (ref 82–98)
MONOCYTES # BLD AUTO: 1.1 K/UL (ref 0.3–1)
MONOCYTES NFR BLD: 10.1 % (ref 4–15)
NEUTROPHILS # BLD AUTO: 5.3 K/UL (ref 1.8–7.7)
NEUTROPHILS NFR BLD: 50.5 % (ref 38–73)
NRBC BLD-RTO: 0 /100 WBC
PLATELET # BLD AUTO: 394 K/UL (ref 150–450)
PLATELET FUNCTION ASSAY - EPINEPHRINE: 139 SECS (ref 76–199)
PMV BLD AUTO: 9.4 FL (ref 9.2–12.9)
POTASSIUM SERPL-SCNC: 4.2 MMOL/L (ref 3.5–5.1)
PROT SERPL-MCNC: 7.6 G/DL (ref 6–8.4)
PROTHROMBIN TIME: 10.4 SEC (ref 9–12.5)
RBC # BLD AUTO: 5.28 M/UL (ref 4.6–6.2)
SODIUM SERPL-SCNC: 142 MMOL/L (ref 136–145)
WBC # BLD AUTO: 10.47 K/UL (ref 3.9–12.7)

## 2024-11-07 PROCEDURE — 36415 COLL VENOUS BLD VENIPUNCTURE: CPT | Performed by: OTOLARYNGOLOGY

## 2024-11-07 PROCEDURE — 85610 PROTHROMBIN TIME: CPT | Performed by: NURSE PRACTITIONER

## 2024-11-07 PROCEDURE — 85025 COMPLETE CBC W/AUTO DIFF WBC: CPT | Performed by: OTOLARYNGOLOGY

## 2024-11-07 PROCEDURE — 1160F RVW MEDS BY RX/DR IN RCRD: CPT | Mod: CPTII,S$GLB,, | Performed by: NURSE PRACTITIONER

## 2024-11-07 PROCEDURE — 3008F BODY MASS INDEX DOCD: CPT | Mod: CPTII,S$GLB,, | Performed by: NURSE PRACTITIONER

## 2024-11-07 PROCEDURE — 3077F SYST BP >= 140 MM HG: CPT | Mod: CPTII,S$GLB,, | Performed by: NURSE PRACTITIONER

## 2024-11-07 PROCEDURE — 1159F MED LIST DOCD IN RCRD: CPT | Mod: CPTII,S$GLB,, | Performed by: NURSE PRACTITIONER

## 2024-11-07 PROCEDURE — 3078F DIAST BP <80 MM HG: CPT | Mod: CPTII,S$GLB,, | Performed by: NURSE PRACTITIONER

## 2024-11-07 PROCEDURE — 85576 BLOOD PLATELET AGGREGATION: CPT | Performed by: OTOLARYNGOLOGY

## 2024-11-07 PROCEDURE — 80053 COMPREHEN METABOLIC PANEL: CPT | Performed by: OTOLARYNGOLOGY

## 2024-11-07 PROCEDURE — 99999 PR PBB SHADOW E&M-EST. PATIENT-LVL III: CPT | Mod: PBBFAC,,, | Performed by: NURSE PRACTITIONER

## 2024-11-07 PROCEDURE — 99215 OFFICE O/P EST HI 40 MIN: CPT | Mod: S$GLB,,, | Performed by: NURSE PRACTITIONER

## 2024-11-07 NOTE — ASSESSMENT & PLAN NOTE
CPAP compliance: No.   Suggest weight loss, increased exercise.  Recommend caution with sedating medication that can cause respiratory depression.   Patients with untreated MYRIAM have an increased risk of stroke, HTN, and heart disease.

## 2024-11-07 NOTE — ASSESSMENT & PLAN NOTE
Per US abdomen 2022  Does not have significant coagulopathy.    Recent INR:  0.9  Platelet count: 394,000  ALT is elevated = 52, but improved from April 2024 labs  Denies alcohol excess  Mild splenomegaly      No evidence of hepatic decompensation    Suggest healthy diet: avoid sugar intake and  fatty foods; avoid alcohol consumption. Increase physical activity; exercise at least 150 minutes weekly.  Would benefit from weight loss.

## 2024-11-07 NOTE — HPI
This is a 28 y.o. male  who presents today for a preoperative evaluation in preparation for septoplasty.  Surgery is indicated for nasal septal deviation .   Patient is new to me.  The history has been obtained by speaking with the patient and reviewing the electronic medical record and/or outside health information. Significant health conditions for the perioperative period are discussed below in assessment and plan.   Patient reports current health status to be Good.  Denies any new symptoms before surgery.

## 2024-11-07 NOTE — OUTPATIENT SUBJECTIVE & OBJECTIVE
Outpatient Subjective & Objective      Chief Complaint: Preoperative evaulation, perioperative medical management, and complication reduction plan.     Functional Capacity: walks dog 3 neighborhood blocks daily without CP/SOB.       Anesthesia issues: None    Difficulty mouth opening: No    Steroid use in the last 12 months:  No    Dental Issues: None    Family anesthesia difficulty: None       Family Hx of Thrombosis: None    Past Medical History:   Diagnosis Date    Anxiety     GERD (gastroesophageal reflux disease)     Psoriasis          Past Medical History Pertinent Negatives:   Diagnosis Date Noted    Asthma 11/07/2024    COPD (chronic obstructive pulmonary disease) 11/07/2024    Coronary artery disease 11/07/2024    Deep vein thrombosis 11/07/2024    Depression 11/07/2024    Diabetes mellitus, type 2 11/07/2024    Disorder of kidney and ureter 11/07/2024    Hyperlipidemia 11/07/2024    Hypertension 11/07/2024    Myocardial infarction 11/07/2024    Pulmonary embolism 11/07/2024    Seizures 11/07/2024    Stroke 11/07/2024    Thyroid disease 11/07/2024         Past Surgical History:   Procedure Laterality Date    HAND SURGERY  2009    TYMPANOSTOMY TUBE PLACEMENT         Review of Systems   Constitutional:  Negative for chills, fatigue, fever and unexpected weight change.   HENT:  Positive for congestion. Negative for hearing loss, rhinorrhea, sore throat, tinnitus and trouble swallowing.    Eyes:  Negative for visual disturbance.   Respiratory:  Negative for cough, chest tightness, shortness of breath and wheezing.    Cardiovascular:  Negative for chest pain, palpitations and leg swelling.   Gastrointestinal:  Negative for constipation and diarrhea.   Genitourinary:  Negative for decreased urine volume, difficulty urinating, dysuria, frequency, hematuria and urgency.   Musculoskeletal:  Negative for arthralgias, back pain, neck pain and neck stiffness.   Skin:  Negative for rash and wound.  "  Allergic/Immunologic: Positive for environmental allergies.   Neurological:  Negative for dizziness, syncope, weakness, numbness and headaches.   Hematological:  Does not bruise/bleed easily.   Psychiatric/Behavioral:  Negative for sleep disturbance and suicidal ideas.               VITALS  Visit Vitals  BP (!) 140/78 (BP Location: Left arm, Patient Position: Sitting)   Pulse 98   Temp 99 °F (37.2 °C) (Oral)   Ht 6' 2" (1.88 m)   Wt (!) 154 kg (339 lb 8.1 oz)   SpO2 97%   BMI 43.59 kg/m²          Physical Exam  Vitals reviewed.   Constitutional:       General: He is not in acute distress.     Appearance: He is well-developed. He is morbidly obese.   HENT:      Head: Normocephalic.      Nose: Nose normal.      Mouth/Throat:      Pharynx: No oropharyngeal exudate.   Eyes:      General:         Right eye: No discharge.         Left eye: No discharge.      Conjunctiva/sclera: Conjunctivae normal.      Pupils: Pupils are equal, round, and reactive to light.   Neck:      Thyroid: No thyromegaly.      Vascular: No carotid bruit or JVD.      Trachea: No tracheal deviation.   Cardiovascular:      Rate and Rhythm: Normal rate and regular rhythm.      Pulses:           Carotid pulses are 2+ on the right side and 2+ on the left side.       Dorsalis pedis pulses are 2+ on the right side and 2+ on the left side.        Posterior tibial pulses are 2+ on the right side and 2+ on the left side.      Heart sounds: Normal heart sounds. No murmur heard.  Pulmonary:      Effort: Pulmonary effort is normal. No respiratory distress.      Breath sounds: Normal breath sounds. No stridor. No wheezing, rhonchi or rales.   Abdominal:      General: Bowel sounds are normal. There is no distension.      Palpations: Abdomen is soft.      Tenderness: There is no abdominal tenderness. There is no guarding.   Musculoskeletal:      Cervical back: Normal range of motion. No pain with movement.      Right lower leg: No edema.      Left lower leg: No " edema.   Lymphadenopathy:      Cervical: No cervical adenopathy.   Skin:     General: Skin is warm and dry.      Capillary Refill: Capillary refill takes less than 2 seconds.      Findings: No erythema or rash.   Neurological:      Mental Status: He is alert and oriented to person, place, and time.   Psychiatric:         Behavior: Behavior normal. Behavior is cooperative.          Significant Labs:  Lab Results   Component Value Date    WBC 10.47 11/07/2024    HGB 14.7 11/07/2024    HCT 44.1 11/07/2024     11/07/2024    CHOL 166 05/03/2023    TRIG 77 05/03/2023    HDL 42 05/03/2023    ALT 52 (H) 11/07/2024    AST 30 11/07/2024     11/07/2024    K 4.2 11/07/2024     11/07/2024    CREATININE 1.0 11/07/2024    BUN 23 (H) 11/07/2024    CO2 26 11/07/2024    TSH 1.205 05/03/2023    INR 0.9 11/07/2024    HGBA1C 5.4 07/21/2022           EKG:   Results for orders placed or performed during the hospital encounter of 01/20/24   EKG 12-lead    Collection Time: 01/20/24  5:16 PM    Narrative    Test Reason : R07.9,    Vent. Rate : 078 BPM     Atrial Rate : 078 BPM     P-R Int : 164 ms          QRS Dur : 088 ms      QT Int : 354 ms       P-R-T Axes : 008 057 022 degrees     QTc Int : 403 ms    Normal sinus rhythm  Normal ECG  No previous ECGs available  Confirmed by Jeremias Orta MD ([id number]) on 1/21/2024 9:46:16 AM    Referred By: WERO   SELF           Confirmed By:Jeremias Orta MD         Imaging   US abdomen 2022  Impression:     Fatty infiltration of liver.     Mild splenomegaly.        Electronically signed by: Stephon Webb MD  Date:                                            08/05/2022  Time:                                           11:22          Active Cardiac Conditions: None      Revised Cardiac Risk Index   High -Risk Surgery  Intraperitoneal; Intrathoracic; suprainguinal vascular Yes- + 1 No- 0   History of Ischemic Heart Disease   (Hx of MI/positive exercise test/current chest pain due  to ischemia/use of nitrate therapy/EKG with pathological Q waves) Yes- + 1 No- 0   History of CHF  (Pulmonary edema/bilateral rales or S3 gallop/PND/CXR showing pulmonary vascular redistribution) Yes- + 1 No- 0   History of CVA   (Prior stroke or TIA) Yes- + 1 No- 0   Pre-operative treatment with insulin Yes- + 1 No- 0   Pre-operative creatinine > 2mg/dl Yes- + 1 No- 0   Total: 0      Risk Status:  Estimated risk of cardiac complications after non-cardiac surgery using the Revised Cardiac Risk Index for Preoperative risk is 3.9 %      ARISCAT (Canet) risk index: Low: 1.6% risk of post-op pulmonary complications.    American Society of Anesthesiologists Physical Status classification (ASA): 3             Outpatient Subjective & Objective

## 2024-11-07 NOTE — ASSESSMENT & PLAN NOTE
Not currently treated; was taking Adderall   Diagnosed as a child   Would like to get back on medication  Suggest follow-up with PCP

## 2024-11-07 NOTE — PROGRESS NOTES
[facility]  Progress Note    Patient Name: James Jeff  MRN: [id number]  Date of Evaluation- 11/07/2024  PCP- Adam Kenney MD    Future cases for Terrell Jeff [[id number]]       Case ID Status Date Time Perico Procedure Provider Location    [id number] [facility] 11/11/2024  7:30 AM 99 SEPTOPLASTY, NOSE, WITH NASAL TURBINATE REDUCTION Mark Orourke III, MD [[id number]] [facility] OR            HPI:  This is a 28 y.o. male  who presents today for a preoperative evaluation in preparation for septoplasty.  Surgery is indicated for nasal septal deviation .   Patient is new to me.  The history has been obtained by speaking with the patient and reviewing the electronic medical record and/or outside health information. Significant health conditions for the perioperative period are discussed below in assessment and plan.   Patient reports current health status to be Good.  Denies any new symptoms before surgery.       Subjective/ Objective:     Chief Complaint: Preoperative evaulation, perioperative medical management, and complication reduction plan.     Functional Capacity: walks dog 3 neighborhood blocks daily without CP/SOB.       Anesthesia issues: None    Difficulty mouth opening: No    Steroid use in the last 12 months:  No    Dental Issues: None    Family anesthesia difficulty: None       Family Hx of Thrombosis: None    Past Medical History:   Diagnosis Date    Anxiety     GERD (gastroesophageal reflux disease)     Psoriasis          Past Medical History Pertinent Negatives:   Diagnosis Date Noted    Asthma 11/07/2024    COPD (chronic obstructive pulmonary disease) 11/07/2024    Coronary artery disease 11/07/2024    Deep vein thrombosis 11/07/2024    Depression 11/07/2024    Diabetes mellitus, type 2 11/07/2024    Disorder of kidney and ureter 11/07/2024    Hyperlipidemia 11/07/2024    Hypertension 11/07/2024    Myocardial infarction 11/07/2024    Pulmonary embolism 11/07/2024    Seizures 11/07/2024     "Stroke 11/07/2024    Thyroid disease 11/07/2024         Past Surgical History:   Procedure Laterality Date    HAND SURGERY  2009    TYMPANOSTOMY TUBE PLACEMENT         Review of Systems   Constitutional:  Negative for chills, fatigue, fever and unexpected weight change.   HENT:  Positive for congestion. Negative for hearing loss, rhinorrhea, sore throat, tinnitus and trouble swallowing.    Eyes:  Negative for visual disturbance.   Respiratory:  Negative for cough, chest tightness, shortness of breath and wheezing.    Cardiovascular:  Negative for chest pain, palpitations and leg swelling.   Gastrointestinal:  Negative for constipation and diarrhea.   Genitourinary:  Negative for decreased urine volume, difficulty urinating, dysuria, frequency, hematuria and urgency.   Musculoskeletal:  Negative for arthralgias, back pain, neck pain and neck stiffness.   Skin:  Negative for rash and wound.   Allergic/Immunologic: Positive for environmental allergies.   Neurological:  Negative for dizziness, syncope, weakness, numbness and headaches.   Hematological:  Does not bruise/bleed easily.   Psychiatric/Behavioral:  Negative for sleep disturbance and suicidal ideas.               VITALS  Visit Vitals  BP (!) 140/78 (BP Location: Left arm, Patient Position: Sitting)   Pulse 98   Temp 99 °F (37.2 °C) (Oral)   Ht 6' 2" (1.88 m)   Wt (!) 154 kg (339 lb 8.1 oz)   SpO2 97%   BMI 43.59 kg/m²          Physical Exam  Vitals reviewed.   Constitutional:       General: He is not in acute distress.     Appearance: He is well-developed. He is morbidly obese.   HENT:      Head: Normocephalic.      Nose: Nose normal.      Mouth/Throat:      Pharynx: No oropharyngeal exudate.   Eyes:      General:         Right eye: No discharge.         Left eye: No discharge.      Conjunctiva/sclera: Conjunctivae normal.      Pupils: Pupils are equal, round, and reactive to light.   Neck:      Thyroid: No thyromegaly.      Vascular: No carotid bruit or JVD. "      Trachea: No tracheal deviation.   Cardiovascular:      Rate and Rhythm: Normal rate and regular rhythm.      Pulses:           Carotid pulses are 2+ on the right side and 2+ on the left side.       Dorsalis pedis pulses are 2+ on the right side and 2+ on the left side.        Posterior tibial pulses are 2+ on the right side and 2+ on the left side.      Heart sounds: Normal heart sounds. No murmur heard.  Pulmonary:      Effort: Pulmonary effort is normal. No respiratory distress.      Breath sounds: Normal breath sounds. No stridor. No wheezing, rhonchi or rales.   Abdominal:      General: Bowel sounds are normal. There is no distension.      Palpations: Abdomen is soft.      Tenderness: There is no abdominal tenderness. There is no guarding.   Musculoskeletal:      Cervical back: Normal range of motion. No pain with movement.      Right lower leg: No edema.      Left lower leg: No edema.   Lymphadenopathy:      Cervical: No cervical adenopathy.   Skin:     General: Skin is warm and dry.      Capillary Refill: Capillary refill takes less than 2 seconds.      Findings: No erythema or rash.   Neurological:      Mental Status: He is alert and oriented to person, place, and time.   Psychiatric:         Behavior: Behavior normal. Behavior is cooperative.          Significant Labs:  Lab Results   Component Value Date    WBC 10.47 11/07/2024    HGB 14.7 11/07/2024    HCT 44.1 11/07/2024     11/07/2024    CHOL 166 05/03/2023    TRIG 77 05/03/2023    HDL 42 05/03/2023    ALT 52 (H) 11/07/2024    AST 30 11/07/2024     11/07/2024    K 4.2 11/07/2024     11/07/2024    CREATININE 1.0 11/07/2024    BUN 23 (H) 11/07/2024    CO2 26 11/07/2024    TSH 1.205 05/03/2023    INR 0.9 11/07/2024    HGBA1C 5.4 07/21/2022           EKG:   Results for orders placed or performed during the hospital encounter of 01/20/24   EKG 12-lead    Collection Time: 01/20/24  5:16 PM    Narrative    Test Reason : R07.9,    Vent.  Rate : 078 BPM     Atrial Rate : 078 BPM     P-R Int : 164 ms          QRS Dur : 088 ms      QT Int : 354 ms       P-R-T Axes : 008 057 022 degrees     QTc Int : 403 ms    Normal sinus rhythm  Normal ECG  No previous ECGs available  Confirmed by Jeremias Orta MD ([id number]) on 1/21/2024 9:46:16 AM    Referred By: [email or web address]   SELF           Confirmed By:Jeremias Orta MD         Imaging   US abdomen 2022  Impression:     Fatty infiltration of liver.     Mild splenomegaly.        Electronically signed by: Stephon Webb MD  Date:                                            08/05/2022  Time:                                           11:22          Active Cardiac Conditions: None      Revised Cardiac Risk Index   High -Risk Surgery  Intraperitoneal; Intrathoracic; suprainguinal vascular Yes- + 1 No- 0   History of Ischemic Heart Disease   (Hx of MI/positive exercise test/current chest pain due to ischemia/use of nitrate therapy/EKG with pathological Q waves) Yes- + 1 No- 0   History of CHF  (Pulmonary edema/bilateral rales or S3 gallop/PND/CXR showing pulmonary vascular redistribution) Yes- + 1 No- 0   History of CVA   (Prior stroke or TIA) Yes- + 1 No- 0   Pre-operative treatment with insulin Yes- + 1 No- 0   Pre-operative creatinine > 2mg/dl Yes- + 1 No- 0   Total: 0      Risk Status:  Estimated risk of cardiac complications after non-cardiac surgery using the Revised Cardiac Risk Index for Preoperative risk is 3.9 %      ARISCAT (Canet) risk index: Low: 1.6% risk of post-op pulmonary complications.    American Society of Anesthesiologists Physical Status classification (ASA): 3                   Orders Placed This Encounter    Protime-INR           Assessment/Plan:     Nasal septal deviation  Scheduled with Dr. Orourke for septoplasty on 11/11/24.    ADHD (attention deficit hyperactivity disorder)  Not currently treated; was taking Adderall   Diagnosed as a child   Would like to get back on medication  Suggest  follow-up with PCP    Morbid obesity with BMI of 40.0-44.9, adult  Patient would benefit from weight loss and has not set goals to achieve success.   Lifestyle changes should be made by eating healthy, exercising at least 150 minutes weekly, and avoiding sedentary behavior.     MYRIAM (obstructive sleep apnea)  CPAP compliance: No.   Suggest weight loss, increased exercise.  Recommend caution with sedating medication that can cause respiratory depression.   Patients with untreated MYRIAM have an increased risk of stroke, HTN, and heart disease.      Fatty liver  Per US abdomen 2022  Does not have significant coagulopathy.    Recent INR:  0.9  Platelet count: 394,000  ALT is elevated = 52, but improved from April 2024 labs  Denies alcohol excess  Mild splenomegaly      No evidence of hepatic decompensation    Suggest healthy diet: avoid sugar intake and  fatty foods; avoid alcohol consumption. Increase physical activity; exercise at least 150 minutes weekly.  Would benefit from weight loss.     Elevated BP without diagnosis of hypertension  BP today: 140/78  Recommend follow-up with PCP for continued BP monitoring.        Discussion/Management of Perioperative Care    Thromboembolic prophylaxis (VTE) Care: Risk factors for thrombosis include: morbid obesity and surgical procedure.  I recommend prophylaxis of thromboembolism with the use of compression stockings/pneumatic devices, and/or pharmacologic agents. The benefits should outweigh the risks for pharmacologic prophylaxis in the perioperative period. I also encourage early ambulation if not contraindicated during the post-operative period.    To reduce the risk of pulmonary complications, prophylactic recommendations include: incentive spirometry use/deep breathing, end tidal carbon dioxide monitoring, and pain control.    To reduce the risk of postoperative renal complications, I recommend the patient maintain adequate hydration.  Avoid/reduce NSAIDS and MORALES-2  inhibitors use as well as IV contrast for renal protection.    I recommend the use of appropriate prophylactic antibiotics to reduce the risk of surgical site infections.        This visit was focused on Preoperative evaluation, Perioperative Medical management, complication reduction plans. I suggest that the patient follows up with primary care or relevant sub specialists for ongoing health care.    I appreciate the opportunity to be involved in this patients care. Please feel free to contact me if there were any questions about this consultation.        I spent a total of 43 minutes on the day of the visit.  This includes face to face time and non-face to face time preparing to see the patient (e.g., review of tests), obtaining and/or reviewing separately obtained history, documenting clinical information in the electronic or other health record, independently interpreting results and communicating results to the patient/family/caregiver, or care coordinator.       Patient is optimized for surgery.       Anni Naqvi NP  Perioperative Medicine  [facility]

## 2024-11-07 NOTE — DISCHARGE INSTRUCTIONS
Your surgery has been scheduled for:______11/11/24_____[facility]  ___________________    You [facility]:  [facility], located on the [place] side of the first floor of the           [facility] ([phone])  ____[facility], located on the [facility] side of the            Second floor of the [facility] ([phone])  ____3rd Floor [facility] located on the [facility] side of the [facility] [phone]  ____[facility]: [address] [address]     Please Note   Tell your doctor if you take Aspirin, products containing Aspirin, herbal medications  or blood thinners, such as Coumadin, Ticlid, or Plavix.  (Consult your provider regarding holding or stopping before surgery).  Arrange for someone to drive you home following surgery.  You will not be allowed to leave the surgical facility alone or drive yourself home following sedation and anesthesia.    Before Surgery  Stop taking all herbal medications, vitamins, and supplements 7 days prior to surgery  No Motrin/Advil (Ibuprofen) 7 days before surgery  No Aleve (Naproxen) 7 days before surgery   No Goody's/BC Powder 7 days before surgery  Refrain from drinking alcoholic beverages for 24 hours before and after surgery  Stop or limit smoking at least 24 hours prior to surgery  You may take Tylenol for pain    Night before Surgery  Do not eat or drink after midnight  Take a shower or bath (shower is recommended).  Bathe with Hibiclens soap or an antibacterial soap from the neck down.  If not supplied by your surgeon, hibiclens soap will need to be purchased over the counter in pharmacy.  Rinse soap off thoroughly.  Shampoo your hair with your regular shampoo    The Day of Surgery  Take another bath or shower with hibiclens or any antibacterial soap, to reduce the chance of infection.  Take  heart and blood pressure medications with a small sip of water, as advised by the perioperative team.  Do not take fluid pills  You may brush your teeth and rinse your mouth, but do not swallow any additional water.   Do not apply perfumes, powder, body lotions or deodorant on the day of surgery.  Nail polish should be removed.  Do not wear makeup or moisturizer  Wear comfortable clothes, such as a button front shirt and loose fitting pants.  Leave all jewelry, including body piercings, and valuables at home.    Bring any devices you will need after surgery such as crutches or canes.  If you have sleep apnea, please bring your CPAP machine  In the event that your physical condition changes including the onset of a cold or respiratory illness, or if you have to delay or cancel your surgery, please notify your surgeon.

## 2024-11-08 NOTE — PRE-PROCEDURE INSTRUCTIONS
Unable to reach patient via phone. Left message with pre procedure instructions and arrival time. The following was sent to pt portal.     Dear Terrell ,     You are scheduled for a procedure with Dr. Orourke on 11/11/2024. Your scheduled arrival time is 6:00 am . This arrival time is approximately 1 1/2 hours before anticipated procedure time. Please wear comfortable clothing  This procedure will take place at the [facility] at the corner of [facility] and [facility]. [facility] [place] to [employer]. The address is:     [address] [place] [address].  [place] [address]     After entering the building, proceed to the second floor and check in with registration.      Your fasting instructions are as follows:     Nothing to eat after 11:00 pm the evening before your surgery.   Anesthesia encourages you to consume clear liquids up to 2 hours prior to arrival time to ensure you are adequately hydrated.     You MUST have a responsible adult to bring you home.         Please HOLD the following medications the morning of your procedure:  Aspirin and Aspirin-containing products (Goody's powder, Excedrin)  Stimulants (Adderall, Vyvanse, Adipex)  Diabetic medication   Prescription creams/gels/lotions        *You may take tylenol if needed         The evening before and morning of your procedure, take a shower using antibacterial soap (ex: Hibiclens or Dial antibacterial soap). DO NOT apply deodorant, lotion, cologne, or anything else to the skin. Please do not wear jewelry or bring any valuables with you.  .     Please do not wear contact lenses the day of your procedure.   Bring any inhalers that you may need.     If you have any procedure-specific questions, please contact your surgeon's office. For pre-admit or anesthesia questions call [phone].     Thanks,  Pre-Admit Testing  Anesthesia Dept [facility]

## 2024-11-10 ENCOUNTER — ANESTHESIA EVENT (OUTPATIENT)
Dept: SURGERY | Facility: HOSPITAL | Age: 29
End: 2024-11-10
Payer: COMMERCIAL

## 2024-11-10 NOTE — ANESTHESIA PREPROCEDURE EVALUATION
[facility]  Anesthesia Pre-Operative Evaluation         Patient Name: James Jeff  YOB: 1995  MRN: [id number]    SUBJECTIVE:     Pre-operative evaluation for Procedure(s) (LRB):  SEPTOPLASTY, NOSE, WITH NASAL TURBINATE REDUCTION (Bilateral)     11/10/2024    James Jeff is a 28 y.o. male w/ a significant PMHx of morbid obesity (BMI 43), MYRIAM, ADHD.  Patient now presents for the above procedure(s).    TTE:   none documented.     Patient Active Problem List   Diagnosis    Psoriasis    Sleep disturbance    MYRIAM (obstructive sleep apnea)    Morbid obesity with BMI of 40.0-44.9, adult    ADHD (attention deficit hyperactivity disorder)    Allergy    Nasal septal deviation    Fatty liver    Elevated BP without diagnosis of hypertension       Review of patient's allergies indicates:   Allergen Reactions    Dog dander        Current Inpatient Medications:      No current facility-administered medications on file prior to encounter.     Current Outpatient Medications on File Prior to Encounter   Medication Sig Dispense Refill    gabapentin (NEURONTIN) 100 MG capsule Take 1 capsule (100 mg total) by mouth every evening. 30 capsule 11    tadalafiL (CIALIS) 20 MG Tab Take 1 tablet (20 mg total) by mouth daily as needed (take 1 hour  prior to sexual activty). 30 tablet 2       Past Surgical History:   Procedure Laterality Date    HAND SURGERY  2009    TYMPANOSTOMY TUBE PLACEMENT         OBJECTIVE:     Vital Signs Range (Last 24H):         Significant Labs:  Lab Results   Component Value Date    WBC 10.47 11/07/2024    HGB 14.7 11/07/2024    HCT 44.1 11/07/2024     11/07/2024    CHOL 166 05/03/2023    TRIG 77 05/03/2023    HDL 42 05/03/2023    ALT 52 (H) 11/07/2024    AST 30 11/07/2024     11/07/2024    K 4.2 11/07/2024     11/07/2024    CREATININE 1.0 11/07/2024    BUN 23 (H) 11/07/2024    CO2 26 11/07/2024    TSH 1.205 05/03/2023    INR 0.9 11/07/2024     HGBA1C 5.4 07/21/2022       Diagnostic Studies: No relevant studies.    EKG:   Results for orders placed or performed during the hospital encounter of 01/20/24   EKG 12-lead    Collection Time: 01/20/24  5:16 PM    Narrative    Test Reason : R07.9,    Vent. Rate : 078 BPM     Atrial Rate : 078 BPM     P-R Int : 164 ms          QRS Dur : 088 ms      QT Int : 354 ms       P-R-T Axes : 008 057 022 degrees     QTc Int : 403 ms    Normal sinus rhythm  Normal ECG  No previous ECGs available  Confirmed by Jeremias Orta MD ([id number]) on 1/21/2024 9:46:16 AM    Referred By: [email or web address]   SELF           Confirmed By:Jeremias Orta MD       ASSESSMENT/PLAN:           Pre-op Assessment    I have reviewed the Patient Summary Reports.     I have reviewed the Nursing Notes. I have reviewed the NPO Status.   I have reviewed the Medications.     Review of Systems  Anesthesia Hx:  No previous Anesthesia             Denies Family Hx of Anesthesia complications.    Denies Personal Hx of Anesthesia complications.                    Hematology/Oncology:  Hematology Normal   Oncology Normal                                   EENT/Dental:  EENT/Dental Normal           Cardiovascular:  Cardiovascular Normal                                              Pulmonary:        Sleep Apnea                Renal/:  Renal/ Normal                 Hepatic/GI:     GERD Liver Disease,               Neurological:  Neurology Normal                                      Endocrine:  Endocrine Normal          Morbid Obesity / BMI > 40  Psych:  Psychiatric History                  Physical Exam  General: Cooperative, Alert and Oriented    Airway:  Mallampati: III   Mouth Opening: Normal  TM Distance: Normal  Tongue: Normal  Neck ROM: Normal ROM    Dental:  Intact        Anesthesia Plan  Type of Anesthesia, risks & benefits discussed:    Anesthesia Type: Gen ETT  Intra-op Monitoring Plan: Standard ASA Monitors  Post Op Pain Control Plan: multimodal analgesia and  IV/PO Opioids PRN  Induction:  IV  Airway Plan: Video, Post-Induction  Informed Consent: Informed consent signed with the Patient and all parties understand the risks and agree with anesthesia plan.  All questions answered.   ASA Score: 3  Day of Surgery Review of History & Physical: H&P Update referred to the surgeon/provider.    Ready For Surgery From Anesthesia Perspective.     .

## 2024-11-11 ENCOUNTER — HOSPITAL ENCOUNTER (OUTPATIENT)
Facility: HOSPITAL | Age: 29
Discharge: HOME | End: 2024-11-11
Attending: OTOLARYNGOLOGY | Admitting: OTOLARYNGOLOGY
Payer: COMMERCIAL

## 2024-11-11 ENCOUNTER — ANESTHESIA (OUTPATIENT)
Dept: SURGERY | Facility: HOSPITAL | Age: 29
End: 2024-11-11
Payer: COMMERCIAL

## 2024-11-11 VITALS
SYSTOLIC BLOOD PRESSURE: 144 MMHG | TEMPERATURE: 98 F | HEART RATE: 67 BPM | DIASTOLIC BLOOD PRESSURE: 85 MMHG | RESPIRATION RATE: 15 BRPM | OXYGEN SATURATION: 95 %

## 2024-11-11 DIAGNOSIS — J34.2 NASAL SEPTAL DEVIATION: Primary | ICD-10-CM

## 2024-11-11 PROCEDURE — 25000003 PHARM REV CODE 250: Performed by: NURSE ANESTHETIST, CERTIFIED REGISTERED

## 2024-11-11 PROCEDURE — 63600175 PHARM REV CODE 636 W HCPCS: Performed by: OTOLARYNGOLOGY

## 2024-11-11 PROCEDURE — 63600175 PHARM REV CODE 636 W HCPCS: Performed by: NURSE ANESTHETIST, CERTIFIED REGISTERED

## 2024-11-11 PROCEDURE — 25000003 PHARM REV CODE 250: Performed by: STUDENT IN AN ORGANIZED HEALTH CARE EDUCATION/TRAINING PROGRAM

## 2024-11-11 PROCEDURE — 37000008 HC ANESTHESIA 1ST 15 MINUTES: Performed by: OTOLARYNGOLOGY

## 2024-11-11 PROCEDURE — 99900035 HC TECH TIME PER 15 MIN (STAT)

## 2024-11-11 PROCEDURE — 37000009 HC ANESTHESIA EA ADD 15 MINS: Performed by: OTOLARYNGOLOGY

## 2024-11-11 PROCEDURE — D9220A PRA ANESTHESIA: Mod: ,,, | Performed by: NURSE ANESTHETIST, CERTIFIED REGISTERED

## 2024-11-11 PROCEDURE — 30140 RESECT INFERIOR TURBINATE: CPT | Mod: 50,51,, | Performed by: OTOLARYNGOLOGY

## 2024-11-11 PROCEDURE — 71000015 HC POSTOP RECOV 1ST HR: Performed by: OTOLARYNGOLOGY

## 2024-11-11 PROCEDURE — 71000033 HC RECOVERY, INTIAL HOUR: Performed by: OTOLARYNGOLOGY

## 2024-11-11 PROCEDURE — 30520 REPAIR OF NASAL SEPTUM: CPT | Mod: ,,, | Performed by: OTOLARYNGOLOGY

## 2024-11-11 PROCEDURE — 71000039 HC RECOVERY, EACH ADD'L HOUR: Performed by: OTOLARYNGOLOGY

## 2024-11-11 PROCEDURE — 36000707: Performed by: OTOLARYNGOLOGY

## 2024-11-11 PROCEDURE — 63600175 PHARM REV CODE 636 W HCPCS: Performed by: STUDENT IN AN ORGANIZED HEALTH CARE EDUCATION/TRAINING PROGRAM

## 2024-11-11 PROCEDURE — 27201423 OPTIME MED/SURG SUP & DEVICES STERILE SUPPLY: Performed by: OTOLARYNGOLOGY

## 2024-11-11 PROCEDURE — 36000706: Performed by: OTOLARYNGOLOGY

## 2024-11-11 PROCEDURE — 71000016 HC POSTOP RECOV ADDL HR: Performed by: OTOLARYNGOLOGY

## 2024-11-11 PROCEDURE — 94761 N-INVAS EAR/PLS OXIMETRY MLT: CPT

## 2024-11-11 RX ORDER — GLUCAGON 1 MG
1 KIT INJECTION
Status: DISCONTINUED | OUTPATIENT
Start: 2024-11-11 | End: 2024-11-11 | Stop reason: HOSPADM

## 2024-11-11 RX ORDER — ACETAMINOPHEN 10 MG/ML
INJECTION, SOLUTION INTRAVENOUS
Status: DISCONTINUED | OUTPATIENT
Start: 2024-11-11 | End: 2024-11-11

## 2024-11-11 RX ORDER — CEFAZOLIN 2 G/1
INJECTION, POWDER, FOR SOLUTION INTRAMUSCULAR; INTRAVENOUS
Status: DISCONTINUED | OUTPATIENT
Start: 2024-11-11 | End: 2024-11-11

## 2024-11-11 RX ORDER — SUCCINYLCHOLINE CHLORIDE 20 MG/ML
INJECTION INTRAMUSCULAR; INTRAVENOUS
Status: DISCONTINUED | OUTPATIENT
Start: 2024-11-11 | End: 2024-11-11

## 2024-11-11 RX ORDER — AMOXICILLIN AND CLAVULANATE POTASSIUM 875; 125 MG/1; MG/1
1 TABLET, FILM COATED ORAL EVERY 12 HOURS
Qty: 14 TABLET | Refills: 0 | Status: SHIPPED | OUTPATIENT
Start: 2024-11-11 | End: 2024-11-18

## 2024-11-11 RX ORDER — EPINEPHRINE 1 MG/ML
INJECTION, SOLUTION, CONCENTRATE INTRAVENOUS
Status: DISCONTINUED | OUTPATIENT
Start: 2024-11-11 | End: 2024-11-11 | Stop reason: HOSPADM

## 2024-11-11 RX ORDER — PROCHLORPERAZINE EDISYLATE 5 MG/ML
5 INJECTION INTRAMUSCULAR; INTRAVENOUS EVERY 30 MIN PRN
Status: DISCONTINUED | OUTPATIENT
Start: 2024-11-11 | End: 2024-11-11 | Stop reason: HOSPADM

## 2024-11-11 RX ORDER — KETAMINE HCL IN 0.9 % NACL 50 MG/5 ML
SYRINGE (ML) INTRAVENOUS
Status: DISCONTINUED | OUTPATIENT
Start: 2024-11-11 | End: 2024-11-11

## 2024-11-11 RX ORDER — OXYCODONE HYDROCHLORIDE 5 MG/1
5 TABLET ORAL
Status: DISCONTINUED | OUTPATIENT
Start: 2024-11-11 | End: 2024-11-11 | Stop reason: HOSPADM

## 2024-11-11 RX ORDER — ROCURONIUM BROMIDE 10 MG/ML
INJECTION, SOLUTION INTRAVENOUS
Status: DISCONTINUED | OUTPATIENT
Start: 2024-11-11 | End: 2024-11-11

## 2024-11-11 RX ORDER — SODIUM CHLORIDE 0.9 % (FLUSH) 0.9 %
10 SYRINGE (ML) INJECTION DAILY PRN
Status: DISCONTINUED | OUTPATIENT
Start: 2024-11-11 | End: 2024-11-11 | Stop reason: HOSPADM

## 2024-11-11 RX ORDER — ONDANSETRON 4 MG/1
4 TABLET, ORALLY DISINTEGRATING ORAL EVERY 8 HOURS PRN
Qty: 12 TABLET | Refills: 0 | Status: SHIPPED | OUTPATIENT
Start: 2024-11-11

## 2024-11-11 RX ORDER — DEXMEDETOMIDINE HYDROCHLORIDE 100 UG/ML
INJECTION, SOLUTION INTRAVENOUS
Status: DISCONTINUED | OUTPATIENT
Start: 2024-11-11 | End: 2024-11-11

## 2024-11-11 RX ORDER — DEXAMETHASONE SODIUM PHOSPHATE 4 MG/ML
INJECTION, SOLUTION INTRA-ARTICULAR; INTRALESIONAL; INTRAMUSCULAR; INTRAVENOUS; SOFT TISSUE
Status: DISCONTINUED | OUTPATIENT
Start: 2024-11-11 | End: 2024-11-11

## 2024-11-11 RX ORDER — ONDANSETRON HYDROCHLORIDE 2 MG/ML
INJECTION, SOLUTION INTRAVENOUS
Status: DISCONTINUED | OUTPATIENT
Start: 2024-11-11 | End: 2024-11-11

## 2024-11-11 RX ORDER — PROPOFOL 10 MG/ML
VIAL (ML) INTRAVENOUS
Status: DISCONTINUED | OUTPATIENT
Start: 2024-11-11 | End: 2024-11-11

## 2024-11-11 RX ORDER — HYDROMORPHONE HYDROCHLORIDE 1 MG/ML
0.2 INJECTION, SOLUTION INTRAMUSCULAR; INTRAVENOUS; SUBCUTANEOUS EVERY 5 MIN PRN
Status: DISCONTINUED | OUTPATIENT
Start: 2024-11-11 | End: 2024-11-11 | Stop reason: HOSPADM

## 2024-11-11 RX ORDER — FENTANYL CITRATE 50 UG/ML
INJECTION, SOLUTION INTRAMUSCULAR; INTRAVENOUS
Status: DISCONTINUED | OUTPATIENT
Start: 2024-11-11 | End: 2024-11-11

## 2024-11-11 RX ORDER — LIDOCAINE HYDROCHLORIDE AND EPINEPHRINE 10; 10 UG/ML; MG/ML
INJECTION, SOLUTION INFILTRATION; PERINEURAL
Status: DISCONTINUED | OUTPATIENT
Start: 2024-11-11 | End: 2024-11-11 | Stop reason: HOSPADM

## 2024-11-11 RX ORDER — LIDOCAINE HYDROCHLORIDE 20 MG/ML
INJECTION INTRAVENOUS
Status: DISCONTINUED | OUTPATIENT
Start: 2024-11-11 | End: 2024-11-11

## 2024-11-11 RX ORDER — HYDROCODONE BITARTRATE AND ACETAMINOPHEN 5; 325 MG/1; MG/1
1 TABLET ORAL EVERY 6 HOURS PRN
Qty: 20 TABLET | Refills: 0 | Status: SHIPPED | OUTPATIENT
Start: 2024-11-11

## 2024-11-11 RX ORDER — MIDAZOLAM HYDROCHLORIDE 1 MG/ML
INJECTION INTRAMUSCULAR; INTRAVENOUS
Status: DISCONTINUED | OUTPATIENT
Start: 2024-11-11 | End: 2024-11-11

## 2024-11-11 RX ADMIN — SUGAMMADEX 400 MG: 100 INJECTION, SOLUTION INTRAVENOUS at 08:11

## 2024-11-11 RX ADMIN — PROPOFOL 250 MG: 10 INJECTION, EMULSION INTRAVENOUS at 07:11

## 2024-11-11 RX ADMIN — CEFAZOLIN 3 G: 2 INJECTION, POWDER, FOR SOLUTION INTRAMUSCULAR; INTRAVENOUS at 08:11

## 2024-11-11 RX ADMIN — SODIUM CHLORIDE: 0.9 INJECTION, SOLUTION INTRAVENOUS at 07:11

## 2024-11-11 RX ADMIN — FENTANYL CITRATE 50 MCG: 50 INJECTION, SOLUTION INTRAMUSCULAR; INTRAVENOUS at 07:11

## 2024-11-11 RX ADMIN — HYDROMORPHONE HYDROCHLORIDE 0.2 MG: 1 INJECTION, SOLUTION INTRAMUSCULAR; INTRAVENOUS; SUBCUTANEOUS at 09:11

## 2024-11-11 RX ADMIN — DEXMEDETOMIDINE HYDROCHLORIDE 15 MCG: 100 INJECTION, SOLUTION INTRAVENOUS at 08:11

## 2024-11-11 RX ADMIN — ACETAMINOPHEN 1000 MG: 10 INJECTION INTRAVENOUS at 08:11

## 2024-11-11 RX ADMIN — LIDOCAINE HYDROCHLORIDE 100 MG: 20 INJECTION INTRAVENOUS at 07:11

## 2024-11-11 RX ADMIN — ROCURONIUM BROMIDE 40 MG: 10 INJECTION, SOLUTION INTRAVENOUS at 08:11

## 2024-11-11 RX ADMIN — SUCCINYLCHOLINE CHLORIDE 160 MG: 20 INJECTION, SOLUTION INTRAMUSCULAR; INTRAVENOUS at 07:11

## 2024-11-11 RX ADMIN — FENTANYL CITRATE 50 MCG: 50 INJECTION, SOLUTION INTRAMUSCULAR; INTRAVENOUS at 08:11

## 2024-11-11 RX ADMIN — DEXAMETHASONE SODIUM PHOSPHATE 8 MG: 4 INJECTION INTRA-ARTICULAR; INTRALESIONAL; INTRAMUSCULAR; INTRAVENOUS; SOFT TISSUE at 08:11

## 2024-11-11 RX ADMIN — MIDAZOLAM HYDROCHLORIDE 2 MG: 2 INJECTION, SOLUTION INTRAMUSCULAR; INTRAVENOUS at 07:11

## 2024-11-11 RX ADMIN — ROCURONIUM BROMIDE 10 MG: 10 INJECTION, SOLUTION INTRAVENOUS at 07:11

## 2024-11-11 RX ADMIN — OXYCODONE HYDROCHLORIDE 5 MG: 5 TABLET ORAL at 10:11

## 2024-11-11 RX ADMIN — ONDANSETRON 4 MG: 2 INJECTION INTRAMUSCULAR; INTRAVENOUS at 08:11

## 2024-11-11 RX ADMIN — Medication 30 MG: at 08:11

## 2024-11-11 RX ADMIN — HYDROMORPHONE HYDROCHLORIDE 0.2 MG: 1 INJECTION, SOLUTION INTRAMUSCULAR; INTRAVENOUS; SUBCUTANEOUS at 10:11

## 2024-11-11 NOTE — H&P
Mr. Jeff          Vitals:     10/01/24 1034   BP: 123/83   Pulse: 72         Chief Complaint:  Other Misc (Nasal obstruction )        HPI:   presents referred by RACH Narvaez for nasal obstruction.  He has had nasal congestion and obstruction he states since he was in grade school.  He has been on Flonase for many years which was helping him only minimally.  He is now on Astepro and Zyrtec which is helping him more.  He is also using saline sinus rinses daily.  He denies any history of chronic recurrent sinus infections but does have a diagnosis of MYRIAM and is on CPAP.     SNOT22- 67 NOSE- 100     Review of Systems:  Constitutional:   weight loss or weight gain: Negative  Allergy/Immunologic:   Positive  Nasal Congestion/Obstruction:   Positive as above  Nosebleeds:   Negative  Sinus infections:   Negative  Headache/Facial Pain:   Negative  Snoring/MYRIAM:   Positive as above  Throat: Infections/Pain:   Negative  Hoarseness/Speech Disturbance:   Negative  Trauma Hx:  Negative     Cardiovascular:  M/I Angina: Negative  Hypertension: Negative  Endocrine:              DM/Steroids: Negative  GI:   Dysphagia/Reflux: Negative  :   GYN Pregnancy: Negative  Renal:   Dialysis: Negative  Lymphatic:   Neck Mass/Lymphadenopathy: Negative  Muscoloskeletal:   Negative  Hematologic:   Bleeding Disorders/Anemia: Negative  Neurologic:    Cranial/Neuralgia: Negative  Pulmonary:   Asthma/SOB/Cough: Negative  Skin Disorders: Negative     Past Medical/Surgical/Family/Social History:     ENT Surgery: Negative  Occupational Exposure: Negative   Problems: Negative  Cancer: Negative     Past Family History:              Family history of Cancer: Negative     Past Social History:              Tobacco:  Former smoker who quit years ago              Alcohol:  1-2 drinks once or twice monthly Drinker        Allergies and medications: Reviewed per med card.     Physical Examination:  Ears:              External auditory canals:   Clear              Hearing: Grossly intact              Tympanic Membranes: Clear  Nose:              External: Normal with good valve support              Intranasal:  Anterior septal deviation to the right with 2 to 3+ turbinates which do decongest relatively well.  This deformity creates 85% obstruction.  Mouth:              Intraorally: Lips, teeth, and gums: Normal              Oropharynx: Normal              Mucosa: Normal              Tongue: Normal  Throat:                            Palate:  Hypertrophic palate but normal uvula with normal elevation, Mallampati 1-2              Tonsils:  Minimal bilaterally              Posterior Pharynx: Normal  Fiberoptic exam: Not performed  Head/Face:      Inspection: Normal and atraumatic              Palpation/Percussion: Non tender              Facial strength: Normal and symmetric              Salivary glands: Normal  Neck: Supple  Thyroid: No masses  Lymphatics: No nodes  Respiratory:              Effort: Normal  Eyes:              Ocular Mobility: Normal              Vision: Grossly intact  Neuro/Psych:              Cranial Nerves: Grossly Intact              Orientation: Normal              Mood/Affect: Normal        Assessment/Plan:  I have discussed my findings with him in detail as well as my recommendations for treatment.  I have encouraged him to continue with his saline sinus rinses utilizing distilled water only as well as his Astepro and Zyrtec which appears to be helping him.  I will place a referral for an allergy consultation for him.  Surgically we discussed that he could benefit from septoplasty and submucous resection of turbinates.  I have discussed the pros and cons risks and benefits as well as technical aspects of this procedure in detail with him.  He understands and accepts this and will consider this information and let us know of his decision.     ----      I have seen and examined the patient in the pre-op area. There have been no  significant interval changes to the history or physical examination as noted above. Plan is to proceed to the OR for the above stated procedure.

## 2024-11-11 NOTE — PLAN OF CARE
Pt in preop bay 31, VSS and IV inserted. Pt denies any open wounds on body or the use of any weight loss injections. Pt needs procedural consents, anesthesia consents, an H&P and an admit order, otherwise ready to roll.

## 2024-11-11 NOTE — ANESTHESIA PROCEDURE NOTES
Intubation    Date/Time: 11/11/2024 8:01 AM    Performed by: Radha Spence CRNA  Authorized by: Radha Spence CRNA    Intubation:     Induction:  Intravenous    Intubated:  Postinduction    Mask Ventilation:  N/a    Attempts:  1    Attempted By:  CRNA    Method of Intubation:  Video laryngoscopy    Blade:  G[place]oa 4    Laryngeal View Grade: Grade I - full view of cords      Difficult Airway Encountered?: No      Complications:  None    Airway Device:  Oral linda    Airway Device Size:  8.0    Style/Cuff Inflation:  Cuffed (inflated to minimal occlusive pressure)    Tube secured:  23    Secured at:  The lips    Placement Verified By:  Capnometry    Complicating Factors:  Obesity    Findings Post-Intubation:  BS equal bilateral and atraumatic/condition of teeth unchanged

## 2024-11-11 NOTE — DISCHARGE INSTRUCTIONS
DO NOT CALL [facility] ON CALL FOR POSTOPERATIVE PROBLEMS. CALL THE  [phone] AND ASK FOR ENT ON CALL.    Please call the clinic [phone] (or after hours [phone]) immediately if you:  Experience a post-op fever 101 or greater. If you have a temp of  degrees, this is the body's normal reaction to surgery/anesthesia. If you are concerned you may have a fever, please check your temp before taking tylenol.   Any redness spreading away from the incision site.   Any unusual, painful swelling near incisions.   Any active bleeding that saturates more than a 4X4 guaze.   Any changes in vision or swelling around the eye.  Any purulent (pus) drainage (green or yellow in color)    What to expect the first few days after surgery:  It is normal to have a lot of nasal drainage that is clear and bloody. Gently dab your nose. Avoid excessive sniffling. Let all drainage fall on your mustache dressing small piece of gauze rolled under your nose, secured with tape). If you have packing in your nose, it is secured. DO NOT remove it or try to maneuver the packing. It is normal for the packing to become bloody. This packing cannot be removed before your first post op appointment. It is normal for your nose to feel numb. This will get better over the next few weeks. If you have an external splint on your nose, do not let it get wet or remove it. This will be removed by the surgeon at your 1st post op appointment.    Helpful tips:  Use a cool mist humidifier by your bed.     Post op Activity:   Sleep on your back, elevated with 2-3 pillows for first 2 weeks. Wear VIK hose for 2 days following surgery or until you are walking around your home. No flying for 2 weeks. No sexual activity for two weeks. No heavy lifting (greater than 10lbs) for 3 weeks. No strenuous activity or bending for long periods of time for 3 weeks. No blowing nose for 3 weeks. Sneeze with your mouth open. No swimming for 4-6weeks. No operating  a motor vehicle/heavy machinery until external splint has been removed and until patient is no longer taking pain medication. Do not smoke or be around smokers. Avoid exposure to cold and allergic triggers.     Dressings:   Change the mustache dressing as needed. You will most likely have pink or red nasal drainage for 2-3 days. If there is any packing in the nose, it may change from white to red from the nasal drainage. Do not touch or pull the packing. It will not come out. It will be removed by the surgeon at the first post op appointment. Call the clinic with any questions. If packing becomes dry/hard, patient may drip ocean spray/saline spray on the packing to moisten as needed. This usually helps decrease nasal pain and makes it easier to remove on the day of the post-op appointment. Apply cold compresses to your cheeks and eyes for 20mins every 2 hrs for the first 2-3 days or however you can tolerate. This will aid in minimizing swelling and bruising.     Massages (only for those getting nasal reconstruction and/or rhinoplasty)  Nasal massages are performed to help with swelling and to contour/shape your nose. If your surgery is one that requires the nasal massages, Dr. Orourke will instruct you how to do these at your first post-op appointment. Do not attempt to start these until he tells your doctor tells you to do so. When you begin, you are to do 10-12 strokes 3-4 times a day. It will be sensitive, this is normal.     You will be seen in 5-6 days post op then 3 weeks after this appointment.     Over the counter meds you can use after surgery include a stool softener such as colace if no bowel movement in 3-5days. Miralax or senokot if no bowel movement after starting colace. Ocean spray/saline spray (this is a must have). Bacitracin ointment to apply alfred edge of both nostrils with a Q-tip. Do not insert more than half the cotton tip. Afrin - only use for severe nasal congestion or bleeding. Use 2 times a  day for 3 days, stop for 1 day, continue 2 times per day for 3 days, then stop completely.     Post op diet - Begin with bland foods for the day after surgery then advance to a regular diet as tolerated. Smoothies, ensure, protein drinks, etc are great if you have packing in your nose. Drink plenty of fluids.   AVOID - Hot and spicy foods including mexican, chinese, and seafood for at least 2 weeks. No salty soups for 1 weeks after surger because it increases swelling and blood pressure. No hard/chewy foods for 2 weeks. Avoid alcoholic and caffeinated beverages for 2 weeks.

## 2024-11-11 NOTE — TRANSFER OF CARE
Anesthesia Transfer of Care Note    Patient: James Jeff    Procedure(s) Performed: Procedure(s) (LRB):  SEPTOPLASTY, NOSE, WITH NASAL TURBINATE REDUCTION (Bilateral)    Patient location: PACU    Anesthesia Type: general    Transport from OR: Transported from OR on 6-10 L/min O2 by face mask with adequate spontaneous ventilation    Post pain: adequate analgesia    Post assessment: no apparent anesthetic complications and tolerated procedure well    Post vital signs: stable    Level of consciousness: alert and awake    Nausea/Vomiting: no nausea/vomiting    Complications: none    Transfer of care protocol was followed      Last vitals: Visit Vitals  /70 (BP Location: Left arm, Patient Position: Lying)   Pulse 62   Temp 36.8 °C (98.2 °F) (Temporal)   Resp 20   SpO2 96%

## 2024-11-11 NOTE — OP NOTE
Surgery date: 11/11/2024  Preop diagnosis:  Nasal septal deviation and hypertrophic turbinates  Postop diagnosis: Same  Path: None  Procedure:  Septoplasty and submucous resection of turbinates  Surgeon:  SOLANGE Orourke III, MD  Procedure in detail:  Patient was taken to the operating room where adequate general endotracheal anesthesia was induced by the anesthesia department.  The nasal septum and inferior turbinates were then injected utilizing lidocaine with epinephrine [id number].  6 cc were administered.  The face was then sterilely prepped and draped.  A right-sided hemitransfixion incision was performed and the mucoperichondrial flap was elevated utilizing Kyler elevator.  The bony cartilaginous junction of the septum was identified and this was .  The deviated portion of the bony as well as inferior cartilaginous septum was excised.  After this was completed the hemitransfixion incision was closed utilizing 5 0 chromic in an interrupted fashion.  Attention was then turned to the inferior turbinates and utilizing the turbinate shaver a submucous resection was performed bilaterally.  The inferior turbinates were then outfractured utilizing the Ugarte elevator.  The mucoperichondrial flaps were then reapproximated utilizing the surgical stapler.  After this was completed excellent nasal airway was achieved.  Verito bivalve splints were then placed bilaterally and sutured anteriorly utilizing 4-0 nylon suture.  The nose was then packed bilaterally utilizing bacitracin coated rolled Telfa packs.  These were then sutured to each other anteriorly utilizing the 4-0 nylon suture.  The patient was then turned over to anesthesia and OG suctioned was administered.  The patient was then awakened from general anesthesia in good condition and transport to the recovery room.

## 2024-11-11 NOTE — BRIEF OP NOTE
[facility]  Brief Operative Note    Surgery Date: 11/11/2024     Surgeons and Role:     * Mark Orourke III, MD - Primary    Assisting Surgeon: None    Pre-op Diagnosis:  Nasal septal deviation [J34.2]  Nasal turbinate hypertrophy [J34.3]  Nasal obstruction [J34.89]  Chronic rhinitis [J31.0]  Hypertrophy of inferior nasal turbinate [J34.3]    Post-op Diagnosis:  Post-Op Diagnosis Codes:     * Nasal septal deviation [J34.2]     * Nasal turbinate hypertrophy [J34.3]     * Nasal obstruction [J34.89]     * Chronic rhinitis [J31.0]     * Hypertrophy of inferior nasal turbinate [J34.3]    Procedure(s) (LRB):  SEPTOPLASTY, NOSE, WITH NASAL TURBINATE REDUCTION (Bilateral)    Anesthesia: General    Operative Findings: see full op note    Estimated Blood Loss: * No values recorded between 11/11/2024  8:17 AM and 11/11/2024  8:48 AM *         Specimens:   Specimen (24h ago, onward)      None              Discharge Note    OUTCOME: Patient tolerated treatment/procedure well without complication and is now ready for discharge.    DISPOSITION: Home or Self Care    FINAL DIAGNOSIS: nasal obstruction    FOLLOWUP: In clinic    DISCHARGE INSTRUCTIONS:  No discharge procedures on file.     Clinical Reference Documents Added to Patient Instructions         Document    SEPTOPLASTY DISCHARGE INSTRUCTIONS (ENGLISH)

## 2024-11-11 NOTE — PLAN OF CARE
Discharge instructions reviewed with pt and spouse at bedside. Both v/u of all instructions. VSS. IV removed with catheter tip intact. No concerns voiced; all questions answered. Prescriptions picked up by spouse from pharmacy. Escorted patient via wheelchair to family member awaiting in private vehicle.

## 2024-11-12 ENCOUNTER — NURSE TRIAGE (OUTPATIENT)
Dept: ADMINISTRATIVE | Facility: CLINIC | Age: 29
End: 2024-11-12
Payer: COMMERCIAL

## 2024-11-12 NOTE — TELEPHONE ENCOUNTER
Patient's wife calling in with questions regarding the nasal packing in place for her  following a septoplasty yesterday. Patient has no complaints  or s/sx of infection in need of triage. Reviewed instructions on AVS which they have in the home.    What to expect the first few days after surgery:  It is normal to have a lot of nasal drainage that is clear and bloody.  Gently dab your nose. Avoid excessive sniffling. Let all drainage fall on  your mustache dressing small piece of gauze rolled under your nose,  secured with tape). If you have packing in your nose, it is secured. DO NOT remove it or try to maneuver the packing. It is normal for the packing to become bloody. This packing cannot be  removed before your first post op appointment. It is normal for your nose to feel numb. This will get better over the  next few weeks. If you have an external splint on your nose, do not let it get wet or remove it. This will be removed by  the surgeon at your 1st post op appointment.    Instructed to call back with additional questions or worsening of symptoms. Patient verbalized understanding.       Reason for Disposition   [1] Follow-up call to recent contact AND [2] information only call, no triage required    Protocols used: Information Only Call - No Triage-A-

## 2024-11-13 ENCOUNTER — PATIENT MESSAGE (OUTPATIENT)
Dept: RESEARCH | Facility: HOSPITAL | Age: 29
End: 2024-11-13
Payer: COMMERCIAL

## 2024-11-13 ENCOUNTER — PATIENT MESSAGE (OUTPATIENT)
Dept: OTOLARYNGOLOGY | Facility: CLINIC | Age: 29
End: 2024-11-13
Payer: COMMERCIAL

## 2024-11-13 ENCOUNTER — TELEPHONE (OUTPATIENT)
Dept: OTOLARYNGOLOGY | Facility: CLINIC | Age: 29
End: 2024-11-13
Payer: COMMERCIAL

## 2024-11-13 DIAGNOSIS — H91.90 HEARING LOSS, UNSPECIFIED HEARING LOSS TYPE, UNSPECIFIED LATERALITY: Primary | ICD-10-CM

## 2024-11-13 NOTE — TELEPHONE ENCOUNTER
----- Message from Mark Orourke MD sent at 11/13/2024  8:09 AM CST -----  Those postop instructions are correct.

## 2024-11-16 ENCOUNTER — E-VISIT (OUTPATIENT)
Dept: FAMILY MEDICINE | Facility: CLINIC | Age: 29
End: 2024-11-16
Payer: COMMERCIAL

## 2024-11-16 DIAGNOSIS — L21.9 SEBORRHEA: Primary | ICD-10-CM

## 2024-11-18 ENCOUNTER — OFFICE VISIT (OUTPATIENT)
Dept: OTOLARYNGOLOGY | Facility: CLINIC | Age: 29
End: 2024-11-18
Payer: COMMERCIAL

## 2024-11-18 VITALS
DIASTOLIC BLOOD PRESSURE: 86 MMHG | WEIGHT: 315 LBS | SYSTOLIC BLOOD PRESSURE: 139 MMHG | HEART RATE: 87 BPM | BODY MASS INDEX: 43.36 KG/M2

## 2024-11-18 DIAGNOSIS — J34.2 NASAL SEPTAL DEVIATION: Primary | ICD-10-CM

## 2024-11-18 DIAGNOSIS — Z98.890 POST-OPERATIVE STATE: ICD-10-CM

## 2024-11-18 PROCEDURE — 99999 PR PBB SHADOW E&M-EST. PATIENT-LVL III: CPT | Mod: PBBFAC,,, | Performed by: OTOLARYNGOLOGY

## 2024-11-18 PROCEDURE — 3079F DIAST BP 80-89 MM HG: CPT | Mod: CPTII,S$GLB,, | Performed by: OTOLARYNGOLOGY

## 2024-11-18 PROCEDURE — 3075F SYST BP GE 130 - 139MM HG: CPT | Mod: CPTII,S$GLB,, | Performed by: OTOLARYNGOLOGY

## 2024-11-18 PROCEDURE — 1160F RVW MEDS BY RX/DR IN RCRD: CPT | Mod: CPTII,S$GLB,, | Performed by: OTOLARYNGOLOGY

## 2024-11-18 PROCEDURE — 99024 POSTOP FOLLOW-UP VISIT: CPT | Mod: S$GLB,,, | Performed by: OTOLARYNGOLOGY

## 2024-11-18 PROCEDURE — 1159F MED LIST DOCD IN RCRD: CPT | Mod: CPTII,S$GLB,, | Performed by: OTOLARYNGOLOGY

## 2024-11-18 RX ORDER — HYDROCORTISONE 25 MG/G
OINTMENT TOPICAL 2 TIMES DAILY
Qty: 20 G | Refills: 1 | Status: SHIPPED | OUTPATIENT
Start: 2024-11-18

## 2024-11-18 NOTE — LETTER
November 18, 2024      [facility]  [address]  [place] [address]  Phone: [phone]           Patient: James Jeff   YOB: 1995  Date of Visit: 11/18/2024    To Whom It May Concern:    Shahriar Jeff  was at [facility] on 11/18/2024. The patient may return to work on 11/19/2024. Patient can lift, carry, and push up to 10-15lbs, can stand constantly for 6-8 hours, and can squat or kneel for 1-3 hours. If you have any questions or concerns, or if I can be of further assistance, please do not hesitate to contact me.    Sincerely,    Liv Washington MA

## 2024-11-18 NOTE — LETTER
November 18, 2024      [facility]  [address]  [place] [address]  Phone: [phone]       Patient: James Jeff   YOB: 1995  Date of Visit: 11/18/2024    To Whom It May Concern:    Shahriar Jeff  was at [facility] on 11/18/2024. The patient may return to work on 11/19/2024. Patient can lift, carry, and push up to 15lbs, can stand constantly for 6-8 hours, and can squat or kneel for 1-3 hours. If you have any questions or concerns, or if I can be of further assistance, please do not hesitate to contact me.    Sincerely,    Liv Washington MA

## 2024-11-18 NOTE — PROGRESS NOTES
One week S/P septoplasty,SMR turbs.  All packs and splints removed.  Septum straight,airway clear.  Post-op instructions reviewed.  Plan: RTC 3 weeks.

## 2024-11-18 NOTE — PROGRESS NOTES
Patient presenting today for hydrocortisone after using some from a relative for his seborreha.  He states he has tried numerous medications and no relief.  He states that his family members dermatologist prescribed topical steroids and it has worked.  He was told not to use this for more than 5 days and should not be using it over and over due to risk to skin on face.      Adam Kenney MD

## 2024-11-18 NOTE — LETTER
November 18, 2024      [facility]  [address]  [place] [address]  Phone: [phone]       Patient: James Jeff   YOB: 1995  Date of Visit: 11/18/2024    To Whom It May Concern:    Shahriar Jeff  was at [facility] on 11/18/2024. The patient may return to work on 11/19/2024. Patient can lift, carry, and push up to 10lbs, can stand constantly for 6 -8 hours, and can squat or kneel for 1-3 hours. .  If you have any questions or concerns, or if I can be of further assistance, please do not hesitate to contact me.    Sincerely,    Liv Washington MA

## 2024-12-03 ENCOUNTER — PATIENT MESSAGE (OUTPATIENT)
Dept: SLEEP MEDICINE | Facility: CLINIC | Age: 29
End: 2024-12-03
Payer: COMMERCIAL

## 2024-12-03 ENCOUNTER — TELEPHONE (OUTPATIENT)
Dept: SLEEP MEDICINE | Facility: CLINIC | Age: 29
End: 2024-12-03
Payer: COMMERCIAL

## 2024-12-03 NOTE — TELEPHONE ENCOUNTER
Staff contact pt to schedule appt and lvm on behalf of orders from dr yip        ----- Message from Jeremias Yip MD sent at 12/3/2024  2:24 PM CST -----  Tacos Nava, He has an order from June 2024 for both a nasal and a full-face mask.  Also, Pat, can you see that he makes a follow-up appt, it's been over a year...    Thanks!  ----- Message -----  From: Pat Damian MA  Sent: 12/3/2024  11:55 AM CST  To: Jeremias Yip MD    Please place new order  ----- Message -----  From: Orly Contreras  Sent: 12/3/2024  11:22 AM CST  To: Pat Damian MA    The pt doesn't have an appointment with our office. The pt is wanting to change back to a nasal mask and needs a new Rx from the MD. The Rx on file is for a FFM .  ----- Message -----  From: Pat Damian MA  Sent: 12/3/2024  11:09 AM CST  To: Pulmonary Dme Pool      ----- Message -----  From: Sisi Huber  Sent: 12/3/2024  10:28 AM CST  To: Phi [facility]cd[facility] Staff    Type: Patient call    Who called: Patient    Does the patient know what this is regarding?  Patient was advised to schedule an appt today; schedule is not popping up on my end; needing appt to continue care to receive cpap supplies; needing new face mask; please advise     Would the patient rather a call back or response via [facility]? Call    Best call back number: [phone]    Additional information:

## 2024-12-03 NOTE — TELEPHONE ENCOUNTER
----- Message from Jeremias Yip MD sent at 12/3/2024  2:24 PM CST -----  Tacos Nava, He has an order from June 2024 for both a nasal and a full-face mask.  Also, Pat, can you see that he makes a follow-up appt, it's been over a year...    Thanks!  ----- Message -----  From: Pat Damian MA  Sent: 12/3/2024  11:55 AM CST  To: Jeremias Yip MD    Please place new order  ----- Message -----  From: Orly Contreras  Sent: 12/3/2024  11:22 AM CST  To: Pat Damian MA    The pt doesn't have an appointment with our office. The pt is wanting to change back to a nasal mask and needs a new Rx from the MD. The Rx on file is for a FFM .  ----- Message -----  From: Pat Damian MA  Sent: 12/3/2024  11:09 AM CST  To: Pulmonary Dme Pool      ----- Message -----  From: Sisi Huber  Sent: 12/3/2024  10:28 AM CST  To: Phi Mcdowell Staff    Type: Patient call    Who called: Patient    Does the patient know what this is regarding?  Patient was advised to schedule an appt today; schedule is not popping up on my end; needing appt to continue care to receive cpap supplies; needing new face mask; please advise     Would the patient rather a call back or response via [facility]? Call    Best call back number: [phone]    Additional information:

## 2024-12-05 ENCOUNTER — OFFICE VISIT (OUTPATIENT)
Dept: SLEEP MEDICINE | Facility: CLINIC | Age: 29
End: 2024-12-05
Payer: COMMERCIAL

## 2024-12-05 VITALS
DIASTOLIC BLOOD PRESSURE: 91 MMHG | HEIGHT: 74 IN | HEART RATE: 111 BPM | SYSTOLIC BLOOD PRESSURE: 130 MMHG | WEIGHT: 315 LBS | BODY MASS INDEX: 40.43 KG/M2

## 2024-12-05 DIAGNOSIS — G47.33 OSA (OBSTRUCTIVE SLEEP APNEA): ICD-10-CM

## 2024-12-05 DIAGNOSIS — G47.09 OTHER INSOMNIA: Primary | ICD-10-CM

## 2024-12-05 PROCEDURE — 3080F DIAST BP >= 90 MM HG: CPT | Mod: CPTII,S$GLB,, | Performed by: INTERNAL MEDICINE

## 2024-12-05 PROCEDURE — 3075F SYST BP GE 130 - 139MM HG: CPT | Mod: CPTII,S$GLB,, | Performed by: INTERNAL MEDICINE

## 2024-12-05 PROCEDURE — 99999 PR PBB SHADOW E&M-EST. PATIENT-LVL III: CPT | Mod: PBBFAC,,, | Performed by: INTERNAL MEDICINE

## 2024-12-05 PROCEDURE — 99214 OFFICE O/P EST MOD 30 MIN: CPT | Mod: S$GLB,,, | Performed by: INTERNAL MEDICINE

## 2024-12-05 PROCEDURE — 3008F BODY MASS INDEX DOCD: CPT | Mod: CPTII,S$GLB,, | Performed by: INTERNAL MEDICINE

## 2024-12-05 PROCEDURE — 1159F MED LIST DOCD IN RCRD: CPT | Mod: CPTII,S$GLB,, | Performed by: INTERNAL MEDICINE

## 2024-12-05 NOTE — PROGRESS NOTES
ESTABLISHED PATIENT VISIT  James Jeff  is a pleasant 29 y.o. male  with PMH significant for GERD, psoriasis, BMI 40+, MYRIAM    Here today for:  follow-up     Since last visit:   See interval history in table below      Past Medical History:   Diagnosis Date    Anxiety     GERD (gastroesophageal reflux disease)     Obesity [employer]    Psoriasis     Seasonal allergies Birth    Sleep apnea 2022     Patient Active Problem List   Diagnosis    Psoriasis    Sleep disturbance    MYRIAM (obstructive sleep apnea)    Morbid obesity with BMI of 40.0-44.9, adult    ADHD (attention deficit hyperactivity disorder)    Allergy    Nasal septal deviation    Fatty liver    Elevated BP without diagnosis of hypertension       Current Outpatient Medications:     azelastine (ASTELIN) 137 mcg (0.1 %) nasal spray, 1 spray (137 mcg total) by Nasal route 2 (two) times daily., Disp: 30 mL, Rfl: 11    fluticasone propionate (FLONASE) 50 mcg/actuation nasal spray, 2 sprays (100 mcg total) by Each Nostril route 2 (two) times a day., Disp: 16 g, Rfl: 11    gabapentin (NEURONTIN) 100 MG capsule, Take 1 capsule (100 mg total) by mouth every evening., Disp: 30 capsule, Rfl: 11    HYDROcodone-acetaminophen (NORCO) 5-325 mg per tablet, Take 1 tablet by mouth every 6 (six) hours as needed for Pain., Disp: 20 tablet, Rfl: 0    hydrocortisone 2.5 % ointment, Apply topically 2 (two) times daily. Apply thin layer twice a day for 5 days, Disp: 20 g, Rfl: 1    ondansetron (ZOFRAN-ODT) 4 MG TbDL, Dissolve 1 tablet (4 mg total) on the tongue every 8 (eight) hours as needed (nausea)., Disp: 12 tablet, Rfl: 0    tadalafiL (CIALIS) 20 MG Tab, Take 1 tablet (20 mg total) by mouth daily as needed (take 1 hour  prior to sexual activty)., Disp: 30 tablet, Rfl: 2     There were no vitals filed for this visit.    Physical Exam:    GEN:   Well-appearing  Psych:  Appropriate affect, demonstrates insight  SKIN:  No rash on the face or bridge of the  nose      LABS:   Lab Results   Component Value Date    HGB 14.7 11/07/2024    CO2 26 11/07/2024       RECORDS REVIEWED PREVIOUSLY:    HST 8.1.23: AHI 10, RDI 17    10/11/23: 10/30 x 1hr 52mins, 6-12cwp (7.2/9.7/10.1), leak (1.1/14.8/54.4), AHI 3.1    ASSESSMENT      PROBLEM DESCRIPTION/ Sx on Presentation  Interval Hx STATUS PLAN   MYRIAM    + snoring   + snoring arousals, denies witnessed apneas ,   dry mouth, chronic mouth breathing  Mother has MYRIAM      PAP history   Dx Study    Mask nasal (mouth venting)  Hybrid FFM  FFM (air touch) hurt nose   Consider chin strap    DME HME   My Air    CPAP age AirSense 11 8/24/23,    PAP altn    Benefits    PROBS Trouble falling sleeping with CPAP    Taking mask off in his sleep    Doesn't like anything over his mouth          LOV : 10/31/23    PAP 11.28.23: mask  F&P Simplus large interface   7cwp -/+ lat N2, 9cwp +/- lat N2, 12cwp +/- lat REM,   Rec: Emin9 push min to 13 cwp, Ramp start at 7+, likely would need higher pressures if supine  Might benefit from chin strap (chin dropped out of FFM during the study)    6.19.24: wants rx for larger mask?  I sent    12.3.24:   Pt wants to try a nasal mask  sonali: rx on file is for a FFM  Actually, has rx for nasal    Also, needs follow-up    12.3.24: 7/90 x [id number], 9-14 (9.6/10.6/10.8), leak 10/15/33, AHI 0.6    11.11.24: had septoplasty, turb reduction    12.5..24 scheduled             partially controlled         PAP PLAN   E min 9 cwp    I max 14 cwp   PS/epr    RAMP    Other    Altn. Had septoplasty, TR to help use   Pressure changes        Wants to try nasal pillows    Consider trying with chin strap if needed or mouth tape    -discussed desensitization      The patient is using and benefitting from PAP therapy    CHECKOUT ITEMS:    He wants to establish with access    Pls send his study, this note, and orders to Access    RTC 1 year   Insomnia      SLEEP SCHEDULE   Duration    Wind- down    Envmnt    CBTi offered   Meds prior  Trazodone (didn't help much)   Meds now Magnesium     Bed Time 9PM   Lights out    Latency 1.5 hours   Arousals 2-3   Back to sleep 10-30min   Stim. ctrl    Wake time 5-6A (6-7A)   Caffeine    Naps    Nocturia 1-2   Work     Falling asleep better with Mg    Trouble falling and staying asleep    Waking frequently at night         Falling asleep better with magnesium, increased physical activity has helped           improved         -MYRIAM management as above     Other issues: AR (astelin, flonase regularly)

## 2024-12-06 ENCOUNTER — OFFICE VISIT (OUTPATIENT)
Dept: URGENT CARE | Facility: CLINIC | Age: 29
End: 2024-12-06
Payer: COMMERCIAL

## 2024-12-06 VITALS
RESPIRATION RATE: 20 BRPM | HEART RATE: 102 BPM | WEIGHT: 315 LBS | DIASTOLIC BLOOD PRESSURE: 91 MMHG | OXYGEN SATURATION: 97 % | SYSTOLIC BLOOD PRESSURE: 146 MMHG | BODY MASS INDEX: 40.43 KG/M2 | HEIGHT: 74 IN | TEMPERATURE: 99 F

## 2024-12-06 DIAGNOSIS — J02.9 SORE THROAT: ICD-10-CM

## 2024-12-06 DIAGNOSIS — B34.9 ACUTE VIRAL SYNDROME: Primary | ICD-10-CM

## 2024-12-06 DIAGNOSIS — J34.9 SINUS PROBLEM: ICD-10-CM

## 2024-12-06 LAB
CTP QC/QA: YES
CTP QC/QA: YES
MOLECULAR STREP A: NEGATIVE
SARS-COV-2 AG RESP QL IA.RAPID: NEGATIVE

## 2024-12-06 RX ORDER — BENZONATATE 200 MG/1
200 CAPSULE ORAL 3 TIMES DAILY PRN
Qty: 30 CAPSULE | Refills: 0 | Status: SHIPPED | OUTPATIENT
Start: 2024-12-06 | End: 2024-12-16

## 2024-12-06 RX ORDER — IPRATROPIUM BROMIDE 21 UG/1
2 SPRAY, METERED NASAL 3 TIMES DAILY PRN
Qty: 30 ML | Refills: 0 | Status: SHIPPED | OUTPATIENT
Start: 2024-12-06

## 2024-12-06 NOTE — PATIENT INSTRUCTIONS
You have tested negative for both COVID and strep.  This is likely a viral infection.   Ipratropium spray can help with nasal congestion.  You can use it in addition to Flonase.  Benzonatate or Tessalon can help with coughing.  Okay to continue taking DayQuil or NyQuil.    - Rest.    - Drink plenty of fluids. Increasing your fluid intake will help loosen up mucous.  - Viral upper respiratory infections typically run their course in 10-14 days.      - You can take over-the-counter claritin, zyrtec, allegra, OR xyzal as directed. These are antihistamines that can help with runny nose, nasal congestion, sneezing, and helps to dry up post-nasal drip, which usually causes sore throat and cough.     - You can take Delsym to help with cough.      - If you do NOT have high blood pressure, you may use a decongestant form (D)  of this medication (ie. Claritin- D, zyrtec-D, allegra-D) or if you do not take the D form, you can take sudafed (pseudoephedrine) over the counter, which is a decongestant. Do NOT take two decongestant (D) medications at the same time (such as mucinex-D and claritin-D or plain sudafed and claritin D). Dextromethorphan (DM) is a cough suppressant over the counter (ie. mucinex DM, robitussin, delsym; dayquil/nyquil has DM as well.)     - You can use Flonase (fluticasone) nasal spray as directed for sinus congestion and postnasal drip. This is a steroid nasal spray that works locally over time to decrease the inflammation in your nose/sinuses and help with allergic symptoms. This is not an quick- relief spray like afrin, but it works well if used daily.  Discontinue if you develop nose bleed  - Use nasal saline prior to Flonase.  - Use Ocean Spray Nasal Saline 1-3 puffs each nostril every 2-3 hours then blow out onto tissue. This is to irrigate the nasal passage way to clear the sinus openings. Use until sinus problem resolved.     - A Neti Pot with sterile saline can help break up nasal congestion and  give relief.      - Chloraseptic throat spray can help numb the throat.      - Warm salt water gargles can help with sore throat.  - Warm tea with honey can help with sore throat and cough. Honey is a natural cough suppressant.     - Acetaminophen (tylenol) or Ibuprofen (advil,motrin) as directed as needed for fever/pain. Avoid tylenol if you have a history of liver disease. Do not take ibuprofen if you have a history of GI bleeding, kidney disease, or if you take blood thinners.      - You must understand that you have received an Urgent Care treatment only and that you may be released before all of your medical problems are known or treated.   - You, the patient, will arrange for follow up care as instructed.   - If your condition worsens or fails to improve we recommend that you receive another evaluation at the ER immediately or contact your PCP to discuss your concerns or return here.   - Follow up with your PCP or specialty clinic as directed in the next 1-2 weeks if not improved or as needed.  You can call [phone] to schedule an appointment with the appropriate provider.    If your symptoms do not improve or worsen, go to the emergency room immediately.

## 2024-12-06 NOTE — PROGRESS NOTES
"Subjective:      Patient ID: James Jeff is a 29 y.o. male.    Vitals:  height is 6' 2" (1.88 m) and weight is 156 kg (343 lb 14.7 oz) (abnormal). His oral temperature is 98.5 °F (36.9 °C). His blood pressure is 146/91 (abnormal) and his pulse is 102. His respiration is 20 and oxygen saturation is 97%.     Chief Complaint: Sinus Problem    Pt presents today with congestion, dry cough, sinus pressure causing headache, fatigue, dryness of the throat, sx started yesterday, tx: nyquil/dayquil    Provider note starts below.    30 yo M here with congestion, dry cough, sinus pressure, fatigue, and chills that started on Wednesday.  States the congestion was worse yesterday but now better. Endorses dry cough and sore throat which is worse today. No fever at home.  No ear pain or fullness. +frontal pressure. HA2/10.  Reports co-worker with similar symptoms prior to him developing symptoms.  No chest pain, shortness of breath, vision disturbances or rash.  No other complaints at this time    Hx allergies. Just flonase.     Sinus Problem  This is a new problem. The current episode started yesterday. The problem has been gradually worsening since onset. There has been no fever. Associated symptoms include congestion, coughing, sinus pressure and sneezing. Pertinent negatives include no hoarse voice, neck pain, shortness of breath, sore throat or swollen glands. Past treatments include oral decongestants. The treatment provided no relief.       HENT:  Positive for congestion and sinus pressure. Negative for sore throat.    Neck: Negative for neck pain.   Respiratory:  Positive for cough. Negative for shortness of breath.    Allergic/Immunologic: Positive for sneezing.      Objective:     Physical Exam   Constitutional: He is oriented to person, place, and time. He appears well-developed. He is cooperative.  Non-toxic appearance. He does not appear ill. No distress.   HENT:   Head: Normocephalic and atraumatic. "   Ears:   Right Ear: Hearing, tympanic membrane, external ear and ear canal normal. no impacted cerumen  Left Ear: Hearing, tympanic membrane, external ear and ear canal normal. no impacted cerumen  Nose: Nose normal. No mucosal edema, rhinorrhea or nasal deformity. No epistaxis. Right sinus exhibits no maxillary sinus tenderness and no frontal sinus tenderness. Left sinus exhibits no maxillary sinus tenderness and no frontal sinus tenderness.   Mouth/Throat: Uvula is midline and mucous membranes are normal. No trismus in the jaw. Normal dentition. No uvula swelling. Posterior oropharyngeal erythema (mild) present. No oropharyngeal exudate or posterior oropharyngeal edema.   Eyes: Conjunctivae and lids are normal. No scleral icterus.   Neck: Trachea normal and phonation normal. Neck supple. No edema present. No erythema present. No neck rigidity present.   Cardiovascular: Normal rate and normal pulses.   Pulmonary/Chest: Effort normal and breath sounds normal. No respiratory distress. He has no decreased breath sounds. He has no wheezes. He has no rhonchi.   Abdominal: Normal appearance.   Musculoskeletal: Normal range of motion.         General: No deformity. Normal range of motion.   Lymphadenopathy:     He has no cervical adenopathy.   Neurological: He is alert and oriented to person, place, and time. He exhibits normal muscle tone.   Skin: Skin is warm, dry, intact and not diaphoretic.   Psychiatric: His speech is normal and behavior is normal. Judgment and thought content normal.   Nursing note and vitals reviewed.    Results for orders placed or performed in visit on 12/06/24   SARS Coronavirus 2 Antigen, POCT Manual Read    Collection Time: 12/06/24 11:17 AM   Result Value Ref Range    SARS Coronavirus 2 Antigen Negative Negative     Acceptable Yes    POCT Strep A, Molecular    Collection Time: 12/06/24 11:38 AM   Result Value Ref Range    Molecular Strep A, POC Negative Negative    Quality  Control Acceptable Yes        Assessment:     1. Acute viral syndrome    2. Sinus problem    3. Sore throat        Plan:       Acute viral syndrome  -     benzonatate (TESSALON) 200 MG capsule; Take 1 capsule (200 mg total) by mouth 3 (three) times daily as needed for Cough.  Dispense: 30 capsule; Refill: 0  -     ipratropium (ATROVENT) 21 mcg (0.03 %) nasal spray; 2 sprays by Each Nostril route 3 (three) times daily as needed for Rhinitis.  Dispense: 30 mL; Refill: 0    Sinus problem  -     SARS Coronavirus 2 Antigen, POCT Manual Read    Sore throat  -     POCT Strep A, Molecular    - Discussed ddx, home care, tx options, and given follow up precautions.  I have reviewed the patient's chart to view previous visits, labs, and imaging to assess PMH and look for any trends or previous treatments.  Questions and concerns addressed.  This is likely viral syndrome which was discussed with patient and supportive therapy was recommended.  Return precautions given.  Patient verbalized understanding is in agreement with plan.

## 2024-12-06 NOTE — LETTER
December 6, 2024      [facility] [place]  [place] [address]  Phone: [phone]  Fax: [phone]       Patient: James Jeff   YOB: 1995  Date of Visit: 12/06/2024    To Whom It May Concern:    Shahriar Jeff  was at [facility] on 12/06/2024. The patient may return to work/school on 12/7/2024 with no restrictions. If you have any questions or concerns, or if I can be of further assistance, please do not hesitate to contact me.    Sincerely,    Shelli Barrientos MA

## 2024-12-10 ENCOUNTER — OFFICE VISIT (OUTPATIENT)
Dept: OTOLARYNGOLOGY | Facility: CLINIC | Age: 29
End: 2024-12-10
Payer: COMMERCIAL

## 2024-12-10 VITALS
DIASTOLIC BLOOD PRESSURE: 87 MMHG | WEIGHT: 315 LBS | BODY MASS INDEX: 44.35 KG/M2 | HEART RATE: 80 BPM | SYSTOLIC BLOOD PRESSURE: 141 MMHG

## 2024-12-10 DIAGNOSIS — Z98.890 POST-OPERATIVE STATE: ICD-10-CM

## 2024-12-10 DIAGNOSIS — J34.2 NASAL SEPTAL DEVIATION: Primary | ICD-10-CM

## 2024-12-10 PROCEDURE — 3079F DIAST BP 80-89 MM HG: CPT | Mod: CPTII,S$GLB,, | Performed by: OTOLARYNGOLOGY

## 2024-12-10 PROCEDURE — 1159F MED LIST DOCD IN RCRD: CPT | Mod: CPTII,S$GLB,, | Performed by: OTOLARYNGOLOGY

## 2024-12-10 PROCEDURE — 99999 PR PBB SHADOW E&M-EST. PATIENT-LVL III: CPT | Mod: PBBFAC,,, | Performed by: OTOLARYNGOLOGY

## 2024-12-10 PROCEDURE — 1160F RVW MEDS BY RX/DR IN RCRD: CPT | Mod: CPTII,S$GLB,, | Performed by: OTOLARYNGOLOGY

## 2024-12-10 PROCEDURE — 99024 POSTOP FOLLOW-UP VISIT: CPT | Mod: S$GLB,,, | Performed by: OTOLARYNGOLOGY

## 2024-12-10 PROCEDURE — 3077F SYST BP >= 140 MM HG: CPT | Mod: CPTII,S$GLB,, | Performed by: OTOLARYNGOLOGY

## 2024-12-10 NOTE — PROGRESS NOTES
One month S/P septoplasty,SMR turbs.  He states that he is breathing better though still some off and on nasal congestion.  Septum straight,airway clear.  Post-op instructions reviewed.  He is cleared to blow his nose, resume workouts and resume his CPAP use.  Recommend he resume his NeilMed saline sinus rinses daily as well as Flonase nasal sprays.  Plan: RTC two months or p.r.n. sooner.

## 2025-02-10 DIAGNOSIS — J31.0 CHRONIC RHINITIS: ICD-10-CM

## 2025-02-11 ENCOUNTER — OFFICE VISIT (OUTPATIENT)
Dept: OTOLARYNGOLOGY | Facility: CLINIC | Age: 30
End: 2025-02-11
Payer: COMMERCIAL

## 2025-02-11 VITALS — WEIGHT: 315 LBS | BODY MASS INDEX: 45.23 KG/M2

## 2025-02-11 DIAGNOSIS — J34.2 NASAL SEPTAL DEVIATION: Primary | ICD-10-CM

## 2025-02-11 DIAGNOSIS — Z98.890 POST-OPERATIVE STATE: ICD-10-CM

## 2025-02-11 PROCEDURE — 99999 PR PBB SHADOW E&M-EST. PATIENT-LVL III: CPT | Mod: PBBFAC,,, | Performed by: OTOLARYNGOLOGY

## 2025-02-11 PROCEDURE — 1160F RVW MEDS BY RX/DR IN RCRD: CPT | Mod: CPTII,S$GLB,, | Performed by: OTOLARYNGOLOGY

## 2025-02-11 PROCEDURE — 99024 POSTOP FOLLOW-UP VISIT: CPT | Mod: S$GLB,,, | Performed by: OTOLARYNGOLOGY

## 2025-02-11 PROCEDURE — 1159F MED LIST DOCD IN RCRD: CPT | Mod: CPTII,S$GLB,, | Performed by: OTOLARYNGOLOGY

## 2025-02-11 RX ORDER — AZELASTINE 1 MG/ML
1 SPRAY, METERED NASAL 2 TIMES DAILY
Qty: 30 ML | Refills: 11 | Status: SHIPPED | OUTPATIENT
Start: 2025-02-11 | End: 2026-02-11

## 2025-02-11 RX ORDER — FLUTICASONE PROPIONATE 50 MCG
2 SPRAY, SUSPENSION (ML) NASAL 2 TIMES DAILY
Qty: 16 G | Refills: 11 | Status: SHIPPED | OUTPATIENT
Start: 2025-02-11

## 2025-02-11 NOTE — PROGRESS NOTES
Three months S/P septoplasty,SMR turbs.  He states that nasal breathing is markedly improved.  He has been using arm and hammer nasal saline rinse which works well for him.  He has resumed his CPAP in his getting good sleep.  Septum straight,airway clear.  Post-op instructions reviewed.  Plan:  Continue present therapy and return to clinic p.r.n.

## 2025-02-13 ENCOUNTER — PATIENT MESSAGE (OUTPATIENT)
Dept: OTOLARYNGOLOGY | Facility: CLINIC | Age: 30
End: 2025-02-13
Payer: COMMERCIAL

## 2025-03-04 ENCOUNTER — OFFICE VISIT (OUTPATIENT)
Dept: DERMATOLOGY | Facility: CLINIC | Age: 30
End: 2025-03-04
Payer: COMMERCIAL

## 2025-03-04 DIAGNOSIS — L30.9 DERMATITIS: Primary | ICD-10-CM

## 2025-03-04 RX ORDER — CLOBETASOL PROPIONATE 0.5 MG/G
OINTMENT TOPICAL 2 TIMES DAILY
Qty: 60 G | Refills: 1 | Status: SHIPPED | OUTPATIENT
Start: 2025-03-04

## 2025-03-04 NOTE — PROGRESS NOTES
The patient location is: home/[place]  The chief complaint leading to consultation is: rash on the leg    Visit type: audiovisual    Face to Face time with patient: 10mins  10 minutes of total time spent on the encounter, which includes face to face time and non-face to face time preparing to see the patient (eg, review of tests), Obtaining and/or reviewing separately obtained history, Documenting clinical information in the electronic or other health record, Independently interpreting results (not separately reported) and communicating results to the patient/family/caregiver, or Care coordination (not separately reported).         Each patient to whom he or she provides medical services by telemedicine is:  (1) informed of the relationship between the physician and patient and the respective role of any other health care provider with respect to management of the patient; and (2) notified that he or she may decline to receive medical services by telemedicine and may withdraw from such care at any time.    History of Present Illness: The patient presents with chief complaint of persistent rash.  Location: right lower leg  Duration: present for several weeks  Signs/Symptoms: persistent red, dry and itchy rash on the legs  Prior treatments: has tried cortisone cream for a couple weeks, and it helped some, but symptoms returned once off.       ROS: Otherwise negative    PE: const/neuro - AAOx3, NAD          Skin -       A/P: 1) Dermatitis - start clobetasol to the area on the right lower leg. Counseled patient on gentle skin care regimen, including need for sensitive soaps/detergents, as well as need for frequent use of sensitize moisturizers. If no improvement, will need in office visit.

## 2025-03-10 ENCOUNTER — LAB VISIT (OUTPATIENT)
Dept: LAB | Facility: HOSPITAL | Age: 30
End: 2025-03-10
Attending: FAMILY MEDICINE
Payer: COMMERCIAL

## 2025-03-10 ENCOUNTER — OFFICE VISIT (OUTPATIENT)
Dept: FAMILY MEDICINE | Facility: CLINIC | Age: 30
End: 2025-03-10
Payer: COMMERCIAL

## 2025-03-10 VITALS
HEART RATE: 71 BPM | WEIGHT: 315 LBS | TEMPERATURE: 98 F | SYSTOLIC BLOOD PRESSURE: 130 MMHG | BODY MASS INDEX: 41.75 KG/M2 | HEIGHT: 73 IN | RESPIRATION RATE: 18 BRPM | OXYGEN SATURATION: 96 % | DIASTOLIC BLOOD PRESSURE: 82 MMHG

## 2025-03-10 DIAGNOSIS — E66.01 MORBID OBESITY WITH BMI OF 45.0-49.9, ADULT: ICD-10-CM

## 2025-03-10 DIAGNOSIS — K76.0 FATTY LIVER: ICD-10-CM

## 2025-03-10 DIAGNOSIS — F90.9 ATTENTION DEFICIT HYPERACTIVITY DISORDER (ADHD), UNSPECIFIED ADHD TYPE: ICD-10-CM

## 2025-03-10 DIAGNOSIS — Z00.00 VISIT FOR WELL MAN HEALTH CHECK: ICD-10-CM

## 2025-03-10 DIAGNOSIS — G47.00 INSOMNIA, UNSPECIFIED TYPE: ICD-10-CM

## 2025-03-10 DIAGNOSIS — L40.9 PSORIASIS: ICD-10-CM

## 2025-03-10 DIAGNOSIS — Z00.00 VISIT FOR WELL MAN HEALTH CHECK: Primary | ICD-10-CM

## 2025-03-10 LAB
ALBUMIN SERPL BCP-MCNC: 4.4 G/DL (ref 3.5–5.2)
ALP SERPL-CCNC: 85 U/L (ref 40–150)
ALT SERPL W/O P-5'-P-CCNC: 55 U/L (ref 10–44)
ANION GAP SERPL CALC-SCNC: 9 MMOL/L (ref 8–16)
AST SERPL-CCNC: 35 U/L (ref 10–40)
BASOPHILS # BLD AUTO: 0.04 K/UL (ref 0–0.2)
BASOPHILS NFR BLD: 0.5 % (ref 0–1.9)
BILIRUB SERPL-MCNC: 1 MG/DL (ref 0.1–1)
BUN SERPL-MCNC: 17 MG/DL (ref 6–20)
CALCIUM SERPL-MCNC: 9.6 MG/DL (ref 8.7–10.5)
CHLORIDE SERPL-SCNC: 103 MMOL/L (ref 95–110)
CHOLEST SERPL-MCNC: 150 MG/DL (ref 120–199)
CHOLEST/HDLC SERPL: 3.7 {RATIO} (ref 2–5)
CO2 SERPL-SCNC: 25 MMOL/L (ref 23–29)
CREAT SERPL-MCNC: 0.8 MG/DL (ref 0.5–1.4)
DIFFERENTIAL METHOD BLD: NORMAL
EOSINOPHIL # BLD AUTO: 0.2 K/UL (ref 0–0.5)
EOSINOPHIL NFR BLD: 2.9 % (ref 0–8)
ERYTHROCYTE [DISTWIDTH] IN BLOOD BY AUTOMATED COUNT: 13.2 % (ref 11.5–14.5)
EST. GFR  (NO RACE VARIABLE): >60 ML/MIN/1.73 M^2
ESTIMATED AVG GLUCOSE: 117 MG/DL (ref 68–131)
GLUCOSE SERPL-MCNC: 87 MG/DL (ref 70–110)
HBA1C MFR BLD: 5.7 % (ref 4–5.6)
HCT VFR BLD AUTO: 45.5 % (ref 40–54)
HDLC SERPL-MCNC: 41 MG/DL (ref 40–75)
HDLC SERPL: 27.3 % (ref 20–50)
HGB BLD-MCNC: 15 G/DL (ref 14–18)
IMM GRANULOCYTES # BLD AUTO: 0.03 K/UL (ref 0–0.04)
IMM GRANULOCYTES NFR BLD AUTO: 0.4 % (ref 0–0.5)
LDLC SERPL CALC-MCNC: 91 MG/DL (ref 63–159)
LYMPHOCYTES # BLD AUTO: 2.7 K/UL (ref 1–4.8)
LYMPHOCYTES NFR BLD: 33.6 % (ref 18–48)
MAGNESIUM SERPL-MCNC: 2.3 MG/DL (ref 1.6–2.6)
MCH RBC QN AUTO: 28 PG (ref 27–31)
MCHC RBC AUTO-ENTMCNC: 33 G/DL (ref 32–36)
MCV RBC AUTO: 85 FL (ref 82–98)
MONOCYTES # BLD AUTO: 0.7 K/UL (ref 0.3–1)
MONOCYTES NFR BLD: 9.3 % (ref 4–15)
NEUTROPHILS # BLD AUTO: 4.3 K/UL (ref 1.8–7.7)
NEUTROPHILS NFR BLD: 53.3 % (ref 38–73)
NONHDLC SERPL-MCNC: 109 MG/DL
NRBC BLD-RTO: 0 /100 WBC
PLATELET # BLD AUTO: 336 K/UL (ref 150–450)
PMV BLD AUTO: 9.8 FL (ref 9.2–12.9)
POTASSIUM SERPL-SCNC: 4.1 MMOL/L (ref 3.5–5.1)
PROT SERPL-MCNC: 7.5 G/DL (ref 6–8.4)
RBC # BLD AUTO: 5.36 M/UL (ref 4.6–6.2)
SODIUM SERPL-SCNC: 137 MMOL/L (ref 136–145)
TRIGL SERPL-MCNC: 90 MG/DL (ref 30–150)
WBC # BLD AUTO: 8 K/UL (ref 3.9–12.7)

## 2025-03-10 PROCEDURE — 36415 COLL VENOUS BLD VENIPUNCTURE: CPT | Mod: PO | Performed by: FAMILY MEDICINE

## 2025-03-10 PROCEDURE — 99999 PR PBB SHADOW E&M-EST. PATIENT-LVL IV: CPT | Mod: PBBFAC,,, | Performed by: FAMILY MEDICINE

## 2025-03-10 PROCEDURE — 85025 COMPLETE CBC W/AUTO DIFF WBC: CPT | Performed by: FAMILY MEDICINE

## 2025-03-10 PROCEDURE — 80061 LIPID PANEL: CPT | Performed by: FAMILY MEDICINE

## 2025-03-10 PROCEDURE — 3075F SYST BP GE 130 - 139MM HG: CPT | Mod: CPTII,S$GLB,, | Performed by: FAMILY MEDICINE

## 2025-03-10 PROCEDURE — 99395 PREV VISIT EST AGE 18-39: CPT | Mod: S$GLB,,, | Performed by: FAMILY MEDICINE

## 2025-03-10 PROCEDURE — 83036 HEMOGLOBIN GLYCOSYLATED A1C: CPT | Performed by: FAMILY MEDICINE

## 2025-03-10 PROCEDURE — 83735 ASSAY OF MAGNESIUM: CPT | Performed by: FAMILY MEDICINE

## 2025-03-10 PROCEDURE — 3008F BODY MASS INDEX DOCD: CPT | Mod: CPTII,S$GLB,, | Performed by: FAMILY MEDICINE

## 2025-03-10 PROCEDURE — 3079F DIAST BP 80-89 MM HG: CPT | Mod: CPTII,S$GLB,, | Performed by: FAMILY MEDICINE

## 2025-03-10 PROCEDURE — 84402 ASSAY OF FREE TESTOSTERONE: CPT | Performed by: FAMILY MEDICINE

## 2025-03-10 PROCEDURE — 1159F MED LIST DOCD IN RCRD: CPT | Mod: CPTII,S$GLB,, | Performed by: FAMILY MEDICINE

## 2025-03-10 PROCEDURE — 80053 COMPREHEN METABOLIC PANEL: CPT | Performed by: FAMILY MEDICINE

## 2025-03-10 PROCEDURE — 1160F RVW MEDS BY RX/DR IN RCRD: CPT | Mod: CPTII,S$GLB,, | Performed by: FAMILY MEDICINE

## 2025-03-10 RX ORDER — DOXEPIN HYDROCHLORIDE 10 MG/ML
SOLUTION ORAL
Qty: 60 ML | Refills: 1 | Status: SHIPPED | OUTPATIENT
Start: 2025-03-10

## 2025-03-10 NOTE — PROGRESS NOTES
"  Well Man VISIT      CHIEF COMPLAINT  Chief Complaint   Patient presents with    Follow-up    Insomnia       HPI  James Jeff is a 29 y.o. male who presents for wellness.     Social Factors  Tobacco use: No  Ready to Quit: No  Alcohol: Yes socially  Intimate partner violence screening  "Do you feel safe in your current relationship?" Yes   "Have you ever been in a relationship in which your partner frightened you or hurt you?" No  Living Will/POA: No  Regular Exercise: No    Depression  Over the past two weeks, have you felt down, depressed, or hopeless? No  Over the past two weeks, have you felt little interest or pleasure in doing things? No    Reproductive Health  STD screening in last year: deferred  HIV screening: ordered    Screen for Chronic Disease  CHD Risk Factors: male gender and obesity (BMI >= 30 kg/m2)  Estimated body mass index is 46.42 kg/m² as calculated from the following:    Height as of this encounter: 6' 1" (1.854 m).    Weight as of this encounter: 159.6 kg (351 lb 13.7 oz).  Dyslipidemia screening needed: ordered  T2DM screening needed:  ordered  Colonoscopy needed: n/a  PSA needed: n/a  AAA screening needed:n/a  Screen men 35 years and older, and men 20 to 34 years of age who have cardiovascular risk factors for dyslipidemia  Begin screening colonoscopies at 50 years of age in men of average risk, and continue until 75 years of age; offer fecal occult blood testing every year, flexible sigmoidoscopy every five years combined with fecal occult blood testing every three years, or colonoscopy every 10 years   The American Urological Association recommends offering PSA testing and digital rectal examination to well-informed men beginning at 40 years of age and continuing until life expectancy is less than 10 years  Screen once with ultrasonography in men 65 to 75 years of age if they have a family history or have smoked at [id number] cigarettes in their lifetime  Screen men with a " "sustained blood pressure greater than 135/80 mm Hg for T2DM      Immunizations  delayed    ALLERGIES and MEDS were verified.   PMHx, PSHx, FHx, SOCIALHx were updated as pertinent.    REVIEW OF SYSTEMS  Review of Systems   Constitutional: Negative.    HENT: Negative.     Eyes: Negative.    Respiratory: Negative.     Cardiovascular: Negative.    Gastrointestinal: Negative.    Genitourinary: Negative.    Musculoskeletal: Negative.    Skin:  Positive for rash.        +lump on shoulder         PHYSICAL EXAM  VITAL SIGNS: /82   Pulse 71   Temp 98.4 °F (36.9 °C) (Oral)   Resp 18   Ht 6' 1" (1.854 m)   Wt (!) 159.6 kg (351 lb 13.7 oz)   SpO2 96%   BMI 46.42 kg/m²   GEN: Well developed, Well nourished, No acute distress.  HENT: Normocephalic, Atraumatic, Bilateral external ears normal, Nose normal, Oropharynx moist, No oral exudates.   Eyes: PERR, EOMI, Conjunctiva normal, No discharge.   Neck: Supple, No tenderness.  Lymphatic: No cervical or supraclavicular lymphadenopathy noted.   Cardiovascular: Normal heart rate, Normal rhythm, No murmurs, No rubs, No gallops.   Thorax & Lungs: Normal breath sounds, No respiratory distress, No wheezing.  Abdomen: Soft, No tenderness, Bowel sounds normal.  Genital: deferred  Skin: Warm, Dry, No erythema, No rash.   Extremities: No edema, No tenderness.       ASSESSMENT/PLAN    Jamse Terry" was seen today for follow-up, medication refill and anxiety.    Diagnoses and all orders for this visit:    Visit for [facility] health check  - Labs to be done fasting    Morbid obesity with BMI of 40.0-44.9, adult  - Patient quit going to the gym, but has gone back to it  - Encouraged him to focus on diet and staying within a healthy calorie range and more protein and less carbs      Psoriasis  - controlled    MYRIAM (obstructive sleep apnea)  - States he uses the nasal pillow, but it often comes off or he removes it because it shifts   - Will give doxepin to help with sleep, but he " is to use cpap with sleep aid         FOLLOW UP: pending results    Adam Kenney MD

## 2025-03-13 LAB — TESTOST FREE SERPL-MCNC: 6.7 PG/ML (ref 5.1–41.5)

## 2025-04-22 RX ORDER — FLUTICASONE PROPIONATE 50 MCG
2 SPRAY, SUSPENSION (ML) NASAL 2 TIMES DAILY
Qty: 16 G | Refills: 11 | Status: SHIPPED | OUTPATIENT
Start: 2025-04-22

## 2025-04-23 ENCOUNTER — PATIENT MESSAGE (OUTPATIENT)
Dept: PODIATRY | Facility: CLINIC | Age: 30
End: 2025-04-23
Payer: COMMERCIAL

## 2025-04-28 ENCOUNTER — TELEPHONE (OUTPATIENT)
Dept: PODIATRY | Facility: CLINIC | Age: 30
End: 2025-04-28
Payer: COMMERCIAL

## 2025-04-28 DIAGNOSIS — M79.673 PAIN OF FOOT, UNSPECIFIED LATERALITY: Primary | ICD-10-CM

## 2025-04-28 NOTE — TELEPHONE ENCOUNTER
Left voice message for patient to give our office a call back at [phone]. To assist with scheduling xray before appt. Tomorrow     Ling Phillip DPM P [facility] E Staff  Xray order has been placed... please offer to schedule pt bf his upcoming appt.

## 2025-04-29 ENCOUNTER — HOSPITAL ENCOUNTER (OUTPATIENT)
Dept: RADIOLOGY | Facility: HOSPITAL | Age: 30
Discharge: HOME | End: 2025-04-29
Attending: PODIATRIST
Payer: COMMERCIAL

## 2025-04-29 ENCOUNTER — OFFICE VISIT (OUTPATIENT)
Dept: PODIATRY | Facility: CLINIC | Age: 30
End: 2025-04-29
Payer: COMMERCIAL

## 2025-04-29 VITALS
HEIGHT: 73 IN | BODY MASS INDEX: 41.75 KG/M2 | RESPIRATION RATE: 18 BRPM | DIASTOLIC BLOOD PRESSURE: 80 MMHG | HEART RATE: 78 BPM | SYSTOLIC BLOOD PRESSURE: 120 MMHG | WEIGHT: 315 LBS

## 2025-04-29 DIAGNOSIS — M76.822 POSTERIOR TIBIAL TENDON DYSFUNCTION (PTTD) OF BOTH LOWER EXTREMITIES: ICD-10-CM

## 2025-04-29 DIAGNOSIS — M76.821 POSTERIOR TIBIAL TENDON DYSFUNCTION (PTTD) OF BOTH LOWER EXTREMITIES: ICD-10-CM

## 2025-04-29 DIAGNOSIS — M21.41 PES PLANUS OF BOTH FEET: ICD-10-CM

## 2025-04-29 DIAGNOSIS — M79.672 FOOT PAIN, BILATERAL: Primary | ICD-10-CM

## 2025-04-29 DIAGNOSIS — M79.673 PAIN OF FOOT, UNSPECIFIED LATERALITY: ICD-10-CM

## 2025-04-29 DIAGNOSIS — M21.42 PES PLANUS OF BOTH FEET: ICD-10-CM

## 2025-04-29 DIAGNOSIS — M79.671 FOOT PAIN, BILATERAL: Primary | ICD-10-CM

## 2025-04-29 PROCEDURE — 99204 OFFICE O/P NEW MOD 45 MIN: CPT | Mod: S$GLB,,, | Performed by: PODIATRIST

## 2025-04-29 PROCEDURE — 1159F MED LIST DOCD IN RCRD: CPT | Mod: CPTII,S$GLB,, | Performed by: PODIATRIST

## 2025-04-29 PROCEDURE — 3008F BODY MASS INDEX DOCD: CPT | Mod: CPTII,S$GLB,, | Performed by: PODIATRIST

## 2025-04-29 PROCEDURE — 1160F RVW MEDS BY RX/DR IN RCRD: CPT | Mod: CPTII,S$GLB,, | Performed by: PODIATRIST

## 2025-04-29 PROCEDURE — 73630 X-RAY EXAM OF FOOT: CPT | Mod: TC,50,PO

## 2025-04-29 PROCEDURE — 3074F SYST BP LT 130 MM HG: CPT | Mod: CPTII,S$GLB,, | Performed by: PODIATRIST

## 2025-04-29 PROCEDURE — 73630 X-RAY EXAM OF FOOT: CPT | Mod: 26,,, | Performed by: RADIOLOGY

## 2025-04-29 PROCEDURE — 3044F HG A1C LEVEL LT 7.0%: CPT | Mod: CPTII,S$GLB,, | Performed by: PODIATRIST

## 2025-04-29 PROCEDURE — 3079F DIAST BP 80-89 MM HG: CPT | Mod: CPTII,S$GLB,, | Performed by: PODIATRIST

## 2025-04-29 PROCEDURE — 99999 PR PBB SHADOW E&M-EST. PATIENT-LVL IV: CPT | Mod: PBBFAC,,, | Performed by: PODIATRIST

## 2025-04-29 RX ORDER — MELOXICAM 15 MG/1
15 TABLET ORAL DAILY
Qty: 20 TABLET | Refills: 0 | Status: SHIPPED | OUTPATIENT
Start: 2025-04-29 | End: 2025-05-19

## 2025-04-29 NOTE — PROGRESS NOTES
Subjective:      Patient ID: James Jeff is a 29 y.o. male.    Chief Complaint:   Heel Pain (Bilateral heel pain and Arch ) and Foot Problem (Dry skin and itching )    James is a 29 y.o. male who presents to the podiatry clinic  with complaint of  bilateral foot pain. Onset of the symptoms was about a month ago. Precipitating event: none known. Current symptoms include: ability to bear weight, but with some pain. Aggravating factors: any weight bearing, rising after sitting, and walking. Symptoms have waxed and waned but are better overall.       Pt relates he has worked at  [employer] for 3 years.. wearing new balance for 2 years  Noticed pain in March.. did go on a cruise vacation in Feb.. no injury      + 1st step pain.  Did not try and oral medication.   Upon his reseach feels it is plantar fascitis.. did go to [employer] bought inserts. Only able to wear them few hours bf they hurt.       No back pain. Some right hip pain.    - smoking now       Past Medical History:   Diagnosis Date    Anxiety     GERD (gastroesophageal reflux disease)     Obesity Adolescense    Psoriasis     Seasonal allergies Birth    Sleep apnea 2022     Past Surgical History:   Procedure Laterality Date    HAND SURGERY  2009    SEPTOPLASTY, NOSE, WITH NASAL TURBINATE REDUCTION Bilateral 11/11/2024    Procedure: SEPTOPLASTY, NOSE, WITH NASAL TURBINATE REDUCTION;  Surgeon: Mark Orourke III, MD;  Location: [facility];  Service: Plastics;  Laterality: Bilateral;  60min    TYMPANOSTOMY TUBE PLACEMENT       Medications Ordered Prior to Encounter[1]  Review of patient's allergies indicates:   Allergen Reactions    Dog dander        Review of Systems   Constitutional: Negative for chills, decreased appetite, fever, malaise/fatigue, night sweats, weight gain and weight loss.   Cardiovascular:  Negative for chest pain, claudication, dyspnea on exertion, leg swelling, palpitations and syncope.   Respiratory:  Negative  "for cough and shortness of breath.    Endocrine: Negative for cold intolerance and heat intolerance.   Hematologic/Lymphatic: Negative for bleeding problem. Does not bruise/bleed easily.   Skin:  Negative for color change, dry skin, flushing, itching, nail changes, poor wound healing, rash, skin cancer, suspicious lesions and unusual hair distribution.   Musculoskeletal:  Negative for arthritis, back pain, falls, gout, joint pain, joint swelling, muscle cramps, muscle weakness, myalgias, neck pain and stiffness.        Foot pain    Gastrointestinal:  Negative for diarrhea, nausea and vomiting.   Neurological:  Negative for dizziness, focal weakness, light-headedness, numbness, paresthesias, tremors, vertigo and weakness.   Psychiatric/Behavioral:  Negative for altered mental status and depression. The patient does not have insomnia.    Allergic/Immunologic: Negative.            Objective:       Vitals:    04/29/25 0954   BP: 120/80   Pulse: 78   Resp: 18   Weight: (!) 161 kg (354 lb 15.1 oz)   Height: 6' 1" (1.854 m)   PainSc: 0-No pain   (!) 161 kg (354 lb 15.1 oz)     Physical Exam  Vitals reviewed.   Constitutional:       General: He is not in acute distress.     Appearance: He is well-developed. He is not ill-appearing, toxic-appearing or diaphoretic.      Comments: Proper supportive shoegear      Cardiovascular:      Pulses:           Dorsalis pedis pulses are 2+ on the right side and 2+ on the left side.        Posterior tibial pulses are 2+ on the right side and 2+ on the left side.   Musculoskeletal:         General: No swelling.      Right lower leg: No edema.      Left lower leg: No edema.      Right ankle: Deformity present. Decreased range of motion.      Right Achilles Tendon: Normal.      Left ankle: Deformity present. Decreased range of motion.      Left Achilles Tendon: Normal.      Right foot: Decreased range of motion. Deformity and prominent metatarsal heads present. No tenderness or bony " tenderness.      Left foot: Decreased range of motion. Deformity and prominent metatarsal heads present. No tenderness or bony tenderness.      Comments: Ankle valgus b/l    + pain with single heel raise left > right with difficulty    Flexible pes planus foot type w/ medial arch collapse and   gastroc equinus       Reducible extensor and flexor contractures at the MTPJ and PIPJ of toes 2-5, bilat.       Strength 5/5 no pain with rom     No pop    Feet:      Right foot:      Protective Sensation: 10 sites tested.  10 sites sensed.      Skin integrity: No ulcer, blister, skin breakdown, erythema, warmth, callus or dry skin.      Toenail Condition: Right toenails are normal.      Left foot:      Protective Sensation: 10 sites tested.  10 sites sensed.      Skin integrity: No ulcer, blister, skin breakdown, erythema, warmth, callus or dry skin.      Toenail Condition: Left toenails are normal.   Skin:     General: Skin is warm.      Capillary Refill: Capillary refill takes 2 to 3 seconds.      Coloration: Skin is not pale.      Findings: No erythema or rash.      Nails: There is no clubbing.   Neurological:      Mental Status: He is alert and oriented to person, place, and time.      Sensory: No sensory deficit.      Gait: Gait abnormal.   Psychiatric:         Attention and Perception: Attention normal.         Mood and Affect: Mood normal.         Speech: Speech normal.         Behavior: Behavior normal.         Thought Content: Thought content normal.         Cognition and Memory: Cognition normal.         X-Ray Foot Complete Bilateral  Narrative: EXAMINATION:  XR FOOT COMPLETE 3 VIEW BILATERAL    CLINICAL HISTORY:  foot pain. flat feet; Pain in unspecified foot    TECHNIQUE:  AP, lateral, and oblique views of both feet were performed.    COMPARISON:  None    FINDINGS:  Phalanges metatarsals and tarsal bones are intact bilaterally with no significant bony abnormalities.  Mild degenerative changes seen at the  "talonavicular level.  Impression: See above    Electronically signed by: Jeremias Locke MD  Date:    04/29/2025  Time:    08:43         Assessment:       Encounter Diagnoses   Name Primary?    Foot pain, bilateral Yes    Posterior tibial tendon dysfunction (PTTD) of both lower extremities     Pes planus of both feet          Plan:       James Terry" was seen today for heel pain and foot problem.    Diagnoses and all orders for this visit:    Foot pain, bilateral  -     Ambulatory Referral/Consult to Physical Therapy; Future    Posterior tibial tendon dysfunction (PTTD) of both lower extremities  -     Ambulatory Referral/Consult to Physical Therapy; Future    Pes planus of both feet    Other orders  -     meloxicam (MOBIC) 15 MG tablet; Take 1 tablet (15 mg total) by mouth once daily. for 20 days      I counseled the patient on his conditions, their implications and medical management.     D/w pt pttd and pes planus     Rx physical therapy    Mobic prescribed. Patient was instructed on dosing information. Discontinue if adverse effects occur       Reviewed xrays    Patient has good supportive shoes   inserts.  Consdier high tops  Consider ankle brace     Consider  MRI /surgical consult next visit             Follow up in about 6 weeks (around 6/10/2025).          [1]   Current Outpatient Medications on File Prior to Visit   Medication Sig Dispense Refill    doxepin (SINEQUAN) 10 mg/mL solution Take 1-2 ml under the tongue each night before bed 60 mL 1    fluticasone propionate (FLONASE) 50 mcg/actuation nasal spray 2 sprays (100 mcg total) by Each Nostril route 2 (two) times a day. 16 g 11    hydrocortisone 2.5 % ointment Apply topically 2 (two) times daily. Apply thin layer twice a day for 5 days 20 g 1    azelastine (ASTELIN) 137 mcg (0.1 %) nasal spray 1 spray (137 mcg total) by Nasal route 2 (two) times daily. 30 mL 11    clobetasol 0.05% (TEMOVATE) 0.05 % Oint Apply topically 2 (two) times daily. 60 " g 1    gabapentin (NEURONTIN) 100 MG capsule Take 1 capsule (100 mg total) by mouth every evening. 30 capsule 11    HYDROcodone-acetaminophen (NORCO) 5-325 mg per tablet Take 1 tablet by mouth every 6 (six) hours as needed for Pain. (Patient not taking: Reported on 3/10/2025) 20 tablet 0    ipratropium (ATROVENT) 21 mcg (0.03 %) nasal spray 2 sprays by Each Nostril route 3 (three) times daily as needed for Rhinitis. 30 mL 0    ondansetron (ZOFRAN-ODT) 4 MG TbDL Dissolve 1 tablet (4 mg total) on the tongue every 8 (eight) hours as needed (nausea). (Patient not taking: Reported on 3/10/2025) 12 tablet 0    tadalafiL (CIALIS) 20 MG Tab Take 1 tablet (20 mg total) by mouth daily as needed (take 1 hour  prior to sexual activty). 30 tablet 2     No current facility-administered medications on file prior to visit.

## 2025-05-28 ENCOUNTER — PATIENT MESSAGE (OUTPATIENT)
Dept: PODIATRY | Facility: CLINIC | Age: 30
End: 2025-05-28
Payer: COMMERCIAL

## 2025-06-10 ENCOUNTER — OFFICE VISIT (OUTPATIENT)
Dept: PODIATRY | Facility: CLINIC | Age: 30
End: 2025-06-10
Payer: COMMERCIAL

## 2025-06-10 VITALS
BODY MASS INDEX: 41.75 KG/M2 | SYSTOLIC BLOOD PRESSURE: 130 MMHG | DIASTOLIC BLOOD PRESSURE: 88 MMHG | WEIGHT: 315 LBS | HEIGHT: 73 IN | HEART RATE: 67 BPM

## 2025-06-10 DIAGNOSIS — M76.821 POSTERIOR TIBIAL TENDON DYSFUNCTION (PTTD) OF BOTH LOWER EXTREMITIES: ICD-10-CM

## 2025-06-10 DIAGNOSIS — M76.822 POSTERIOR TIBIAL TENDON DYSFUNCTION (PTTD) OF BOTH LOWER EXTREMITIES: ICD-10-CM

## 2025-06-10 DIAGNOSIS — M21.42 PES PLANUS OF BOTH FEET: ICD-10-CM

## 2025-06-10 DIAGNOSIS — M79.671 FOOT PAIN, BILATERAL: Primary | ICD-10-CM

## 2025-06-10 DIAGNOSIS — M21.41 PES PLANUS OF BOTH FEET: ICD-10-CM

## 2025-06-10 DIAGNOSIS — M79.672 FOOT PAIN, BILATERAL: Primary | ICD-10-CM

## 2025-06-10 PROCEDURE — 99214 OFFICE O/P EST MOD 30 MIN: CPT | Mod: S$GLB,,, | Performed by: PODIATRIST

## 2025-06-10 PROCEDURE — 1159F MED LIST DOCD IN RCRD: CPT | Mod: CPTII,S$GLB,, | Performed by: PODIATRIST

## 2025-06-10 PROCEDURE — 3044F HG A1C LEVEL LT 7.0%: CPT | Mod: CPTII,S$GLB,, | Performed by: PODIATRIST

## 2025-06-10 PROCEDURE — 99999 PR PBB SHADOW E&M-EST. PATIENT-LVL III: CPT | Mod: PBBFAC,,, | Performed by: PODIATRIST

## 2025-06-10 PROCEDURE — 1160F RVW MEDS BY RX/DR IN RCRD: CPT | Mod: CPTII,S$GLB,, | Performed by: PODIATRIST

## 2025-06-10 PROCEDURE — 3008F BODY MASS INDEX DOCD: CPT | Mod: CPTII,S$GLB,, | Performed by: PODIATRIST

## 2025-06-10 PROCEDURE — 3079F DIAST BP 80-89 MM HG: CPT | Mod: CPTII,S$GLB,, | Performed by: PODIATRIST

## 2025-06-10 PROCEDURE — 3075F SYST BP GE 130 - 139MM HG: CPT | Mod: CPTII,S$GLB,, | Performed by: PODIATRIST

## 2025-06-10 RX ORDER — MELOXICAM 15 MG/1
15 TABLET ORAL DAILY
Qty: 20 TABLET | Refills: 0 | Status: SHIPPED | OUTPATIENT
Start: 2025-06-10 | End: 2025-06-30

## 2025-06-10 NOTE — PROGRESS NOTES
Subjective:      Patient ID: James Jeff is a 29 y.o. male.    Chief Complaint:   Foot Pain (Bl foot pain feel soreness and discomfort around the arch and heel of foot)    James is a 29 y.o. male who returns  to the podiatry  f/u   bilateral foot pain.    Presents with s.o.  Doing better.   Relates that the mobic helped about 95% .. Pain returned more when off it.   Not on anything now.   P.therapy was too costly to start...     Same spot of pain but now tolerable at work.  Pt relates that standing on a foam gel mat helps   Wearing new balance shoes    No pain while walking     Last visit:     D/w pt pttd and pes planus   Rx physical therapy  Mobic prescribed. Patient was instructed on dosing information. Discontinue if adverse effects occur     Reviewed xrays  Patient has good supportive shoes   inserts.  Consdier high tops  Consider ankle brace  Consider  MRI /surgical consult next visit                 Past Medical History:   Diagnosis Date    Anxiety     GERD (gastroesophageal reflux disease)     Obesity Adolescense    Psoriasis     Seasonal allergies Birth    Sleep apnea 2022     Past Surgical History:   Procedure Laterality Date    HAND SURGERY  2009    SEPTOPLASTY, NOSE, WITH NASAL TURBINATE REDUCTION Bilateral 11/11/2024    Procedure: SEPTOPLASTY, NOSE, WITH NASAL TURBINATE REDUCTION;  Surgeon: Mark Orourke III, MD;  [facility];  Service: Plastics;  Laterality: Bilateral;  60min    TYMPANOSTOMY TUBE PLACEMENT       Medications Ordered Prior to Encounter[1]  Review of patient's allergies indicates:   Allergen Reactions    Dog dander        Review of Systems   Constitutional: Negative for chills, decreased appetite, fever, malaise/fatigue, night sweats, weight gain and weight loss.   Cardiovascular:  Negative for chest pain, claudication, dyspnea on exertion, leg swelling, palpitations and syncope.   Respiratory:  Negative for cough and shortness of breath.    Endocrine: Negative  "for cold intolerance and heat intolerance.   Hematologic/Lymphatic: Negative for bleeding problem. Does not bruise/bleed easily.   Skin:  Negative for color change, dry skin, flushing, itching, nail changes, poor wound healing, rash, skin cancer, suspicious lesions and unusual hair distribution.   Musculoskeletal:  Negative for arthritis, back pain, falls, gout, joint pain, joint swelling, muscle cramps, muscle weakness, myalgias, neck pain and stiffness.        Foot pain    Gastrointestinal:  Negative for diarrhea, nausea and vomiting.   Neurological:  Negative for dizziness, focal weakness, light-headedness, numbness, paresthesias, tremors, vertigo and weakness.   Psychiatric/Behavioral:  Negative for altered mental status and depression. The patient does not have insomnia.    Allergic/Immunologic: Negative.            Objective:       Vitals:    06/10/25 0929   BP: 130/88   Pulse: 67   Weight: (!) 162.2 kg (357 lb 9.4 oz)   Height: 6' 1" (1.854 m)   PainSc:   3   PainLoc: Foot   (!) 162.2 kg (357 lb 9.4 oz)     Physical Exam  Vitals reviewed.   Constitutional:       General: He is not in acute distress.     Appearance: He is well-developed. He is not ill-appearing, toxic-appearing or diaphoretic.      Comments: Proper supportive shoegear      Cardiovascular:      Pulses:           Dorsalis pedis pulses are 2+ on the right side and 2+ on the left side.        Posterior tibial pulses are 2+ on the right side and 2+ on the left side.   Musculoskeletal:         General: No swelling.      Right lower leg: No edema.      Left lower leg: No edema.      Right ankle: Deformity present. Decreased range of motion.      Right Achilles Tendon: Normal.      Left ankle: Deformity present. Decreased range of motion.      Left Achilles Tendon: Normal.      Right foot: Decreased range of motion. Deformity and prominent metatarsal heads present. No tenderness or bony tenderness.      Left foot: Decreased range of motion. Deformity " and prominent metatarsal heads present. No tenderness or bony tenderness.      Comments: Ankle valgus b/l         Flexible pes planus foot type w/ medial arch collapse and   gastroc equinus       Reducible extensor and flexor contractures at the MTPJ and PIPJ of toes 2-5, bilat.            Feet:      Right foot:      Protective Sensation: 10 sites tested.  10 sites sensed.      Skin integrity: No ulcer, blister, skin breakdown, erythema, warmth, callus or dry skin.      Toenail Condition: Right toenails are normal.      Left foot:      Protective Sensation: 10 sites tested.  10 sites sensed.      Skin integrity: No ulcer, blister, skin breakdown, erythema, warmth, callus or dry skin.      Toenail Condition: Left toenails are normal.   Skin:     General: Skin is warm.      Capillary Refill: Capillary refill takes 2 to 3 seconds.      Coloration: Skin is not pale.      Findings: No erythema or rash.      Nails: There is no clubbing.   Neurological:      Mental Status: He is alert and oriented to person, place, and time.      Sensory: No sensory deficit.      Gait: Gait abnormal.   Psychiatric:         Attention and Perception: Attention normal.         Mood and Affect: Mood normal.         Speech: Speech normal.         Behavior: Behavior normal.         Thought Content: Thought content normal.         Cognition and Memory: Cognition normal.         X-Ray Foot Complete Bilateral  Narrative: EXAMINATION:  XR FOOT COMPLETE 3 VIEW BILATERAL    CLINICAL HISTORY:  foot pain. flat feet; Pain in unspecified foot    TECHNIQUE:  AP, lateral, and oblique views of both feet were performed.    COMPARISON:  None    FINDINGS:  Phalanges metatarsals and tarsal bones are intact bilaterally with no significant bony abnormalities.  Mild degenerative changes seen at the talonavicular level.  Impression: See above    Electronically signed by: Jeremias Locke MD  Date:    04/29/2025  Time:    08:43         Assessment:       Encounter  "Diagnoses   Name Primary?    Foot pain, bilateral Yes    Posterior tibial tendon dysfunction (PTTD) of both lower extremities     Pes planus of both feet            Plan:       Donovanjose Terry" was seen today for foot pain.    Diagnoses and all orders for this visit:    Foot pain, bilateral    Posterior tibial tendon dysfunction (PTTD) of both lower extremities    Pes planus of both feet    Other orders  -     meloxicam (MOBIC) 15 MG tablet; Take 1 tablet (15 mg total) by mouth once daily. for 20 days        I counseled the patient on his conditions, their implications and medical management.       Physcial therapy: needs to start   Will re-check deductible cost is prohibitive now     Wearing new balance  Could not tolerated inserts    Re-fill mobic ; well tolerated     Consdier mri  but not likely needed    Prn            [1]   Current Outpatient Medications on File Prior to Visit   Medication Sig Dispense Refill    doxepin (SINEQUAN) 10 mg/mL solution Take 1-2 ml under the tongue each night before bed 60 mL 1    fluticasone propionate (FLONASE) 50 mcg/actuation nasal spray 2 sprays (100 mcg total) by Each Nostril route 2 (two) times a day. 16 g 11    hydrocortisone 2.5 % ointment Apply topically 2 (two) times daily. Apply thin layer twice a day for 5 days 20 g 1    azelastine (ASTELIN) 137 mcg (0.1 %) nasal spray 1 spray (137 mcg total) by Nasal route 2 (two) times daily. 30 mL 11    clobetasol 0.05% (TEMOVATE) 0.05 % Oint Apply topically 2 (two) times daily. 60 g 1    gabapentin (NEURONTIN) 100 MG capsule Take 1 capsule (100 mg total) by mouth every evening. 30 capsule 11    HYDROcodone-acetaminophen (NORCO) 5-325 mg per tablet Take 1 tablet by mouth every 6 (six) hours as needed for Pain. (Patient not taking: Reported on 3/10/2025) 20 tablet 0    ipratropium (ATROVENT) 21 mcg (0.03 %) nasal spray 2 sprays by Each Nostril route 3 (three) times daily as needed for Rhinitis. 30 mL 0    ondansetron (ZOFRAN-ODT) " 4 MG TbDL Dissolve 1 tablet (4 mg total) on the tongue every 8 (eight) hours as needed (nausea). (Patient not taking: Reported on 3/10/2025) 12 tablet 0    tadalafiL (CIALIS) 20 MG Tab Take 1 tablet (20 mg total) by mouth daily as needed (take 1 hour  prior to sexual activty). 30 tablet 2     No current facility-administered medications on file prior to visit.

## 2025-06-19 ENCOUNTER — PATIENT MESSAGE (OUTPATIENT)
Dept: ALLERGY | Facility: CLINIC | Age: 30
End: 2025-06-19
Payer: COMMERCIAL

## 2025-06-20 ENCOUNTER — PATIENT MESSAGE (OUTPATIENT)
Dept: ALLERGY | Facility: CLINIC | Age: 30
End: 2025-06-20
Payer: COMMERCIAL

## 2025-06-26 ENCOUNTER — PATIENT MESSAGE (OUTPATIENT)
Dept: ALLERGY | Facility: CLINIC | Age: 30
End: 2025-06-26
Payer: COMMERCIAL

## 2025-07-09 ENCOUNTER — OFFICE VISIT (OUTPATIENT)
Dept: ALLERGY | Facility: CLINIC | Age: 30
End: 2025-07-09
Payer: COMMERCIAL

## 2025-07-09 VITALS — HEIGHT: 73 IN | WEIGHT: 315 LBS | BODY MASS INDEX: 41.75 KG/M2

## 2025-07-09 DIAGNOSIS — J30.81 ALLERGIC RHINITIS DUE TO DOGS: Primary | ICD-10-CM

## 2025-07-09 DIAGNOSIS — J30.1 ALLERGIC RHINITIS DUE TO GRASS POLLEN: ICD-10-CM

## 2025-07-09 PROCEDURE — 99999 PR PBB SHADOW E&M-EST. PATIENT-LVL III: CPT | Mod: PBBFAC,,, | Performed by: ALLERGY & IMMUNOLOGY

## 2025-07-09 PROCEDURE — 1159F MED LIST DOCD IN RCRD: CPT | Mod: CPTII,S$GLB,, | Performed by: ALLERGY & IMMUNOLOGY

## 2025-07-09 PROCEDURE — 3044F HG A1C LEVEL LT 7.0%: CPT | Mod: CPTII,S$GLB,, | Performed by: ALLERGY & IMMUNOLOGY

## 2025-07-09 PROCEDURE — 3008F BODY MASS INDEX DOCD: CPT | Mod: CPTII,S$GLB,, | Performed by: ALLERGY & IMMUNOLOGY

## 2025-07-09 PROCEDURE — 99215 OFFICE O/P EST HI 40 MIN: CPT | Mod: S$GLB,,, | Performed by: ALLERGY & IMMUNOLOGY

## 2025-07-09 NOTE — PROGRESS NOTES
Subjective:       Patient ID: James Jeff is a 29 y.o. male.    Chief Complaint:  Follow-up  AR to dog    HPI:       Pt presents to discuss immunotherapy. Since LV here has had septoplasty, turbinate reduction. Has noted significant improvement in nasal congestion, though not completely resolved. Still w some pnd, ocular itching, sneezing. Continues flonase 2 sen twice daily, zyrtec. Astelin is too drying for him. Takes prn afrin, no more than 3 d in a row, only every few weeks.  Uses nasal rinses prn.  Given suboptimal response to medical management, pt would like to proceed w SCIT.      Hx from 10/10/24  Pt w hx allergic rhinitis to dog presents for evaluation. Has questions about immunotherpy. Patient's symptoms include itchy eyes, nasal congestion, postnasal drip, sinus pressure, sneezing, and watery eyes. Congestion is most prominent. These symptoms are perennial. Current triggers include exposure to dog. The patient has been suffering from these symptoms for approximately 5 years. Has been around dogs all his life but sx's just started 5 yrs ago.   The patient has tried flonase1 sen up to 3 times daily and 20 mg zyrtec with parital/inadequate relief of symptoms. Astelin did not seem helpful. Immunotherapy has never been tried. He is interested b/c he will likely always be around dogs. Environmental measures to minimize dog dander exposure are only partially helpful.   The patient has never had nasal polyps. The patient has no history of asthma. The patient has no history of eczema. The patient does not suffer from frequent sinopulmonary infections. The patient has not had sinus surgery in the past, though has turbinectomy, repair of dev nasal septum scheduled for next month.        Environmental History: Pets in the home: dogs (1).  Air purifiers    Past Medical History:   Diagnosis Date    Anxiety     GERD (gastroesophageal reflux disease)     Obesity Adolescense    Psoriasis     Seasonal  allergies Birth    Sleep apnea 2022         Family History   Problem Relation Name Age of Onset    Miscarriages / Stillbirths Mother Elissa     Hypertension Mother Elissa     Valvular heart disease Mother Elissa         on Coumadin    Stroke Mother Elissa     Obesity Mother Elissa     Diabetes Mother Elissa     Alcohol abuse Father      Alcohol abuse Brother           Review of Systems   Constitutional:  Negative for activity change, fatigue and fever.   HENT:  Positive for congestion, postnasal drip and sinus pressure. Negative for ear discharge, hearing loss, nosebleeds, rhinorrhea and sneezing.    Eyes:  Negative for photophobia, pain, discharge, redness and itching.   Respiratory:  Negative for cough, shortness of breath and wheezing.    Cardiovascular:  Negative for chest pain.   Gastrointestinal:  Negative for constipation, diarrhea, nausea and vomiting.   Genitourinary:  Negative for difficulty urinating.   Musculoskeletal:  Negative for joint swelling and myalgias.   Skin:  Negative for rash.   Neurological:  Negative for headaches.   Hematological:  Does not bruise/bleed easily.   Psychiatric/Behavioral:  Negative for behavioral problems and sleep disturbance.           Objective:   Physical Exam  Constitutional:       General: He is not in acute distress.     Appearance: He is well-developed. He is not diaphoretic.   HENT:      Head: Normocephalic and atraumatic.      Right Ear: External ear normal.      Left Ear: External ear normal.      Mouth/Throat:      Pharynx: No oropharyngeal exudate.   Eyes:      General:         Right eye: No discharge.         Left eye: No discharge.      Conjunctiva/sclera: Conjunctivae normal.   Neck:      Thyroid: No thyromegaly.   Cardiovascular:      Rate and Rhythm: Normal rate.   Pulmonary:      Effort: Pulmonary effort is normal. No respiratory distress.   Abdominal:      General: Bowel sounds are normal. There is no distension.   Musculoskeletal:         General: Normal  "range of motion.      Cervical back: Normal range of motion.   Skin:     General: Skin is warm.      Findings: No rash.   Neurological:      Mental Status: He is alert and oriented to person, place, and time.      Motor: No abnormal muscle tone.   Psychiatric:         Behavior: Behavior normal.             No results found for: "IGGSERUM", "IGM", "IGA"     Total IgE   Date Value Ref Range Status   08/27/2024 <35 0 - 100 IU/mL Final       Eos #   Date Value Ref Range Status   03/10/2025 0.2 0.0 - 0.5 K/uL Final   11/07/2024 0.5 0.0 - 0.5 K/uL Final   04/29/2024 0.4 0.0 - 0.5 K/uL Final     Eosinophil %   Date Value Ref Range Status   03/10/2025 2.9 0.0 - 8.0 % Final   11/07/2024 4.7 0.0 - 8.0 % Final   04/29/2024 4.4 0.0 - 8.0 % Final     Total IgE   Date Value Ref Range Status   08/27/2024 <35 0 - 100 IU/mL Final        Latest Reference Range & Units 08/27/24 14:21   A. alternata IgE <0.10 kU/L <0.10   Altern. alternata Class  CLASS 0   Aspergillus Fumigatus IgE <0.10 kU/L <0.10   A. fumigatus Class  CLASS 0   [place] Grass IgE <0.10 kU/L 0.38 (H)   [place]uda Grass Class  CLASS 1   Cat Dander IgE <0.10 kU/L <0.10   Cat Epithelium Class  CLASS 0   Beaman IgE <0.10 kU/L <0.10   Beaman Class  CLASS 0   D. farinae <0.10 kU/L <0.10   D. farinae Class  CLASS 0   D. pteronyssinus Dust Mite IgE <0.10 kU/L <0.10   D. pteronyssinus Class  CLASS 0   Dog Dander IgE <0.10 kU/L 4.56 (H)   Dog Dander Class  CLASS 3   English Plantain IgE <0.10 kU/L 0.11 (H)   English Plantain Class  CLASS 0/1   Marshelder IgE <0.10 kU/L <0.10   Marshelder Class  CLASS 0   Oak, Class  CLASS 0   Pecan [place] Tree IgE <0.10 kU/L 0.10   Pecan, Class  CLASS 0/1   Ragweed, Western IgE <0.10 kU/L 0.12 (H)   Ragweed, Western, Class  CLASS 0/1   Ryan Grass IgE <0.10 kU/L 0.62 (H)   Rayn Grass Class  CLASS 1   [place] Tree IgE <0.10 kU/L <0.10   (H): Data is abnormally high    Assessment:       1. Allergic rhinitis due to dogs    2. Allergic rhinitis " due to grass pollen           Plan:         Discussed pros/cons, risks/benefits of SCIT. Pt would like to proceed. Interested in rush SCIT. Consent reviewed and signed.  Will order SCIT. Once received, schedule babb SCIT.    Continue flonase, zyrtec, sinus rinses  Fu 6-12 mo         Total of 45 minutes on encounter the day of the visit. This includes face to face time and non-face to face time preparing to see the patient (eg, review of tests), obtaining and/or reviewing separately obtained history, documenting clinical information in the electronic or other health record, independently interpreting results and communicating results to the patient/family/caregiver, or care coordinator.

## 2025-07-16 ENCOUNTER — TELEPHONE (OUTPATIENT)
Dept: ALLERGY | Facility: CLINIC | Age: 30
End: 2025-07-16
Payer: COMMERCIAL

## 2025-07-16 NOTE — TELEPHONE ENCOUNTER
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 Vaccine ordered. Interested in rush. Can we please schedule in fellow's rush clinic when ready.  Thank you,   LM

## 2025-07-22 ENCOUNTER — PATIENT MESSAGE (OUTPATIENT)
Dept: DERMATOLOGY | Facility: CLINIC | Age: 30
End: 2025-07-22
Payer: COMMERCIAL

## 2025-07-24 NOTE — TELEPHONE ENCOUNTER
Yeah, he needs an appointment. In office is preferable so if we need to scrape/culture, etc we'll be able to do so.

## 2025-07-28 ENCOUNTER — PATIENT MESSAGE (OUTPATIENT)
Dept: ALLERGY | Facility: CLINIC | Age: 30
End: 2025-07-28
Payer: COMMERCIAL

## 2025-07-28 ENCOUNTER — ON-DEMAND VIRTUAL (OUTPATIENT)
Dept: URGENT CARE | Facility: CLINIC | Age: 30
End: 2025-07-28
Payer: COMMERCIAL

## 2025-07-28 DIAGNOSIS — H57.89 EYE SWELLING, RIGHT: ICD-10-CM

## 2025-07-28 DIAGNOSIS — R42 DIZZINESS: Primary | ICD-10-CM

## 2025-07-28 PROCEDURE — 98006 SYNCH AUDIO-VIDEO EST MOD 30: CPT | Mod: 95,,,

## 2025-07-28 RX ORDER — MECLIZINE HCL 12.5 MG 12.5 MG/1
12.5 TABLET ORAL 3 TIMES DAILY PRN
Qty: 15 TABLET | Refills: 0 | Status: SHIPPED | OUTPATIENT
Start: 2025-07-28 | End: 2025-08-02

## 2025-07-28 NOTE — PROGRESS NOTES
Subjective:      Patient ID: James Jeff is a 29 y.o. male.    Vitals:  vitals were not taken for this visit.     Chief Complaint: Eye Problem and Dizziness      Visit Type: TELE AUDIOVISUAL - This visit was conducted virtually based on  subjective information and limited objective exam    Present with the patient at the time of consultation: TELEMED PRESENT WITH PATIENT: None  LOCATION OF PATIENT [facility]  Two patient identifiers used to verify patient- saying out date of birth and full name.       Past Medical History:   Diagnosis Date    Anxiety     GERD (gastroesophageal reflux disease)     Obesity [employer]    Psoriasis     Seasonal allergies Birth    Sleep apnea 2022     Past Surgical History:   Procedure Laterality Date    HAND SURGERY  2009    SEPTOPLASTY, NOSE, WITH NASAL TURBINATE REDUCTION Bilateral 11/11/2024    Procedure: SEPTOPLASTY, NOSE, WITH NASAL TURBINATE REDUCTION;  Surgeon: Mark Orourke III, MD;  Location: [facility];  Service: Plastics;  Laterality: Bilateral;  60min    TYMPANOSTOMY TUBE PLACEMENT       Review of patient's allergies indicates:   Allergen Reactions    Dog dander      Medications Ordered Prior to Encounter[1]  Family History   Problem Relation Name Age of Onset    Miscarriages / Stillbirths Mother Elissa     Hypertension Mother Elissa     Valvular heart disease Mother Elissa         on Coumadin    Stroke Mother Elissa     Obesity Mother Elissa     Diabetes Mother Elissa     Alcohol abuse Father      Alcohol abuse Brother         Medications Ordered                [id number] - [facility] - [address]   [address], [place] [address]    Telephone: [phone]   Fax: [phone]   Hours: Not open 24 hours                         E-Prescribed (1 of 1)              meclizine (ANTIVERT) 12.5 mg tablet    Sig: Take 1 tablet (12.5 mg total) by mouth 3 (three) times daily as needed for Dizziness.       Start: 7/28/25     Quantity: 15 tablet  Refills: 0                           Ohs Peq Odvv Intake    7/28/2025  7:21 AM CDT - Filed by Patient   What is your current physical address in the event of a medical emergency? [address] [place] [id number]   Are you able to take your vital signs? Yes   Systolic Blood Pressure: 132   Diastolic Blood Pressure: 84   Weight: 355   Height: 73   Pulse: 85   Temperature:    Respiration rate:    Pulse Oxygen:    Please attach any relevant images or files    Is your employer contracted with [facility]? No         28 yo male c/o right eyelid irritation, itching, swelling since last night with mild pain.   Reports nasal congestion which is chronic.   Took benadryl, flonase, xyzal.   Woke up feeling light headed  with dizziness,  reports only taking steroid and benadryl last night.   Reports allergy to dogs and working with an allergist on starting immunotherapy soon.   Denies fever, chills, sinus pressure/pain, visual changes, eye discharge, palpitations, dizziness.         Constitution: Negative for chills and fever.   HENT:  Positive for congestion. Negative for sinus pain and sinus pressure.    Eyes:  Positive for eye itching and eyelid swelling (right). Negative for eye trauma, eye discharge, eye pain, eye redness, vision loss, double vision and blurred vision.   Allergic/Immunologic: Positive for seasonal allergies.        Objective:   The physical exam was conducted virtually.    AAO x 3 ; no acute distress noted; appearance normal; mood and behavior normal; thought process normal  Head- normocephalic  Nose- nasal congestion  Eyes- right eye mild swelling, crusting, and itching reported by patient  Ears- normal appearing; no discharge, no erythema  Mouth- appears normal  Oropharynx- no erythema, lesions  Lungs- breathing at a normal rate, no acute distress noted  Heart- no reports of tachycardia, palpitations, chest pain  Abdomen- non distended, non tender reported by patient  Skin- warm  and dry, no erythema or edema noted by patient or visualized  Psych- as above; no si/hi      Assessment:     1. Dizziness    2. Eye swelling, right        Plan:         Thank you for choosing [facility]!    Our goal in the [facility] is to always provide outstanding medical care. You may receive a survey by mail or e-mail in the next week regarding your experience today. We would greatly appreciate you completing and returning the survey. Your feedback provides us with a way to recognize our staff who provide very good care, and it helps us learn how to improve when your experience was below our aspiration of excellence.         We appreciate you trusting us with your medical care. We hope you feel better soon. We will be happy to take care of you for all of your future medical needs.    You must understand that you've received an Urgent Care treatment only and that you may be released before all your medical problems are known or treated. You, the patient, will arrange for follow up care as instructed.    Follow up with your PCP or specialty clinic as directed in the next 1-2 weeks if not improved or as needed.  You can call [phone] to schedule an appointment with the appropriate provider.    If your condition worsens we recommend that you receive another evaluation in person, with your primary care provider, urgent care or at the emergency room immediately or contact your primary medical clinics after hours call service to discuss your concerns.         Dizziness  Comments:  possibly labrynthitis vs dehydration vs sinus infection  rx meclizine TID prn   work note provided  follow up if sx persist for in person workup  Orders:  -     meclizine (ANTIVERT) 12.5 mg tablet; Take 1 tablet (12.5 mg total) by mouth 3 (three) times daily as needed for Dizziness.  Dispense: 15 tablet; Refill: 0    Eye swelling, right  Comments:  most likely related to allergies  warm compresses,  lubricating eye drops  continue oral antihistamines daily   follow up if sx persist or worsen                         [1]   Current Outpatient Medications on File Prior to Visit   Medication Sig Dispense Refill    doxepin (SINEQUAN) 10 mg/mL solution Take 1-2 ml under the tongue each night before bed 60 mL 1    fluticasone propionate (FLONASE) 50 mcg/actuation nasal spray 2 sprays (100 mcg total) by Each Nostril route 2 (two) times a day. 16 g 11    hydrocortisone 2.5 % ointment Apply topically 2 (two) times daily. Apply thin layer twice a day for 5 days 20 g 1     No current facility-administered medications on file prior to visit.

## 2025-08-18 ENCOUNTER — PATIENT MESSAGE (OUTPATIENT)
Dept: ALLERGY | Facility: CLINIC | Age: 30
End: 2025-08-18
Payer: COMMERCIAL

## 2025-08-20 ENCOUNTER — OFFICE VISIT (OUTPATIENT)
Dept: ALLERGY | Facility: CLINIC | Age: 30
End: 2025-08-20
Payer: COMMERCIAL

## 2025-08-20 DIAGNOSIS — J30.81 ALLERGIC RHINITIS DUE TO DOGS: Primary | ICD-10-CM

## 2025-08-20 DIAGNOSIS — Z91.89 AT RISK FOR ANAPHYLAXIS: ICD-10-CM

## 2025-08-20 DIAGNOSIS — J30.1 ALLERGIC RHINITIS DUE TO GRASS POLLEN: ICD-10-CM

## 2025-08-20 PROCEDURE — 98006 SYNCH AUDIO-VIDEO EST MOD 30: CPT | Mod: 95,,, | Performed by: ALLERGY & IMMUNOLOGY

## 2025-08-20 PROCEDURE — 3044F HG A1C LEVEL LT 7.0%: CPT | Mod: CPTII,95,, | Performed by: ALLERGY & IMMUNOLOGY

## 2025-08-20 RX ORDER — EPINEPHRINE 0.3 MG/.3ML
1 INJECTION SUBCUTANEOUS
Qty: 2 EACH | Refills: 0 | Status: SHIPPED | OUTPATIENT
Start: 2025-08-20

## 2025-08-20 RX ORDER — MONTELUKAST SODIUM 10 MG/1
10 TABLET ORAL DAILY
Qty: 3 TABLET | Refills: 0 | Status: SHIPPED | OUTPATIENT
Start: 2025-08-20 | End: 2025-08-23

## 2025-08-20 RX ORDER — PREDNISONE 20 MG/1
20 TABLET ORAL 2 TIMES DAILY
Qty: 6 TABLET | Refills: 0 | Status: SHIPPED | OUTPATIENT
Start: 2025-08-20

## 2025-08-25 ENCOUNTER — PATIENT MESSAGE (OUTPATIENT)
Dept: ALLERGY | Facility: CLINIC | Age: 30
End: 2025-08-25
Payer: COMMERCIAL

## 2025-08-28 ENCOUNTER — PATIENT MESSAGE (OUTPATIENT)
Dept: FAMILY MEDICINE | Facility: CLINIC | Age: 30
End: 2025-08-28
Payer: COMMERCIAL

## 2025-08-28 ENCOUNTER — PATIENT MESSAGE (OUTPATIENT)
Facility: CLINIC | Age: 30
End: 2025-08-28
Payer: COMMERCIAL

## 2025-09-03 ENCOUNTER — OFFICE VISIT (OUTPATIENT)
Dept: ALLERGY | Facility: CLINIC | Age: 30
End: 2025-09-03
Payer: COMMERCIAL

## 2025-09-03 DIAGNOSIS — J30.81 ALLERGIC RHINITIS DUE TO DOGS: Primary | ICD-10-CM

## 2025-09-03 DIAGNOSIS — J30.1 ALLERGIC RHINITIS DUE TO GRASS POLLEN: ICD-10-CM

## 2025-09-03 PROCEDURE — 99999 PR PBB SHADOW E&M-EST. PATIENT-LVL II: CPT | Mod: PBBFAC,,, | Performed by: STUDENT IN AN ORGANIZED HEALTH CARE EDUCATION/TRAINING PROGRAM

## (undated) DEVICE — Device

## (undated) DEVICE — BLADE SURGICAL 15C

## (undated) DEVICE — STAPLER SEPTAL ENTACT

## (undated) DEVICE — SUT 5-0 CHROMIC GUT / P-3

## (undated) DEVICE — SUT MCRYL PLUS 4-0 PS2 27IN

## (undated) DEVICE — GLOVE BIOGEL ECLIPSE SZ 7.5

## (undated) DEVICE — DRAPE INSTR MAGNETIC 10X16IN

## (undated) DEVICE — SUT ETHILON 4-0 PS2 18 BLK

## (undated) DEVICE — MARKER FN REG DUAL UTIL RULER

## (undated) DEVICE — PENCIL ELECTROSURG HOLST W/BLD

## (undated) DEVICE — BLADE INFERIOR TURBINATE 5/PK

## (undated) DEVICE — ELECTRODE REM PLYHSV RETURN 9

## (undated) DEVICE — GOWN POLY REINF BRTH SLV XL

## (undated) DEVICE — DRAPE STERI INSTRUMENT 1018

## (undated) DEVICE — ELECTRODE NEEDLE 1IN

## (undated) DEVICE — SPLINT REUTER BIVALVE 0.5MM

## (undated) DEVICE — SPONGE COTTON TRAY 4X4IN

## (undated) DEVICE — DRAPE STERI-DRAPE 1000 17X11IN